# Patient Record
Sex: FEMALE | Race: WHITE | NOT HISPANIC OR LATINO | Employment: OTHER | URBAN - NONMETROPOLITAN AREA
[De-identification: names, ages, dates, MRNs, and addresses within clinical notes are randomized per-mention and may not be internally consistent; named-entity substitution may affect disease eponyms.]

---

## 2023-02-02 PROBLEM — K25.9 CAMERON ULCER: Status: ACTIVE | Noted: 2023-02-02

## 2023-02-02 PROBLEM — R93.89 ABNORMAL CHEST XRAY: Status: ACTIVE | Noted: 2023-02-02

## 2023-02-02 PROBLEM — E78.5 DYSLIPIDEMIA: Status: ACTIVE | Noted: 2023-02-02

## 2023-02-02 PROBLEM — C50.911 MALIGNANT NEOPLASM OF UNSPECIFIED SITE OF RIGHT FEMALE BREAST (MULTI): Status: ACTIVE | Noted: 2023-02-02

## 2023-02-02 PROBLEM — M10.9 GOUT: Status: ACTIVE | Noted: 2023-02-02

## 2023-02-02 PROBLEM — C50.411 MALIGNANT NEOPLASM OF UPPER-OUTER QUADRANT OF RIGHT FEMALE BREAST (MULTI): Status: ACTIVE | Noted: 2023-02-02

## 2023-02-02 PROBLEM — K52.9 COLITIS: Status: ACTIVE | Noted: 2023-02-02

## 2023-02-02 PROBLEM — R60.9 EDEMA: Status: ACTIVE | Noted: 2023-02-02

## 2023-02-02 PROBLEM — E55.9 VITAMIN D DEFICIENCY: Status: ACTIVE | Noted: 2023-02-02

## 2023-02-02 PROBLEM — B37.49 CANDIDA UTI: Status: ACTIVE | Noted: 2023-02-02

## 2023-02-02 PROBLEM — E03.9 HYPOTHYROID: Status: ACTIVE | Noted: 2023-02-02

## 2023-02-02 PROBLEM — K21.9 GERD WITHOUT ESOPHAGITIS: Status: ACTIVE | Noted: 2023-02-02

## 2023-02-02 PROBLEM — E66.9 CLASS 1 OBESITY WITH BODY MASS INDEX (BMI) OF 32.0 TO 32.9 IN ADULT: Status: ACTIVE | Noted: 2023-02-02

## 2023-02-02 PROBLEM — R39.9 URINARY SYMPTOM OR SIGN: Status: ACTIVE | Noted: 2023-02-02

## 2023-02-02 PROBLEM — E87.5 HYPERKALEMIA: Status: ACTIVE | Noted: 2023-02-02

## 2023-02-02 PROBLEM — E03.9 HYPOTHYROIDISM: Status: ACTIVE | Noted: 2023-02-02

## 2023-02-02 PROBLEM — E53.8 VITAMIN B12 DEFICIENCY: Status: ACTIVE | Noted: 2023-02-02

## 2023-02-02 PROBLEM — S43.006A SHOULDER DISLOCATION: Status: ACTIVE | Noted: 2023-02-02

## 2023-02-02 PROBLEM — M79.18 MUSCULOSKELETAL PAIN: Status: ACTIVE | Noted: 2023-02-02

## 2023-02-02 PROBLEM — M25.551 PAIN IN RIGHT HIP: Status: ACTIVE | Noted: 2023-02-02

## 2023-02-02 PROBLEM — E66.811 CLASS 1 OBESITY WITH BODY MASS INDEX (BMI) OF 32.0 TO 32.9 IN ADULT: Status: ACTIVE | Noted: 2023-02-02

## 2023-02-02 PROBLEM — N18.5 CKD (CHRONIC KIDNEY DISEASE), STAGE V (MULTI): Status: ACTIVE | Noted: 2023-02-02

## 2023-02-02 PROBLEM — C50.919 BREAST CANCER (MULTI): Status: ACTIVE | Noted: 2023-02-02

## 2023-02-02 PROBLEM — M25.511 RIGHT SHOULDER PAIN: Status: ACTIVE | Noted: 2023-02-02

## 2023-02-02 PROBLEM — E03.9 HYPOTHYROID: Status: RESOLVED | Noted: 2023-02-02 | Resolved: 2023-02-02

## 2023-02-02 PROBLEM — I34.81 MITRAL VALVE ANNULAR CALCIFICATION: Status: ACTIVE | Noted: 2023-02-02

## 2023-02-02 PROBLEM — Z20.822 SUSPECTED COVID-19 VIRUS INFECTION: Status: ACTIVE | Noted: 2023-02-02

## 2023-02-02 PROBLEM — Z86.79: Status: ACTIVE | Noted: 2023-02-02

## 2023-02-02 PROBLEM — N18.31 STAGE 3A CHRONIC KIDNEY DISEASE (MULTI): Status: ACTIVE | Noted: 2023-02-02

## 2023-02-02 PROBLEM — R10.2 PELVIC PAIN IN FEMALE: Status: ACTIVE | Noted: 2023-02-02

## 2023-02-02 PROBLEM — I10 BENIGN ESSENTIAL HYPERTENSION: Status: ACTIVE | Noted: 2023-02-02

## 2023-02-02 PROBLEM — M25.552 LEFT HIP PAIN: Status: ACTIVE | Noted: 2023-02-02

## 2023-02-02 PROBLEM — R53.83 FATIGUE: Status: ACTIVE | Noted: 2023-02-02

## 2023-02-02 PROBLEM — R92.8 ABNORMAL MAMMOGRAM OF RIGHT BREAST: Status: ACTIVE | Noted: 2023-02-02

## 2023-02-02 PROBLEM — K44.9 HIATAL HERNIA: Status: ACTIVE | Noted: 2023-02-02

## 2023-02-02 PROBLEM — N39.0 BACTERIAL UTI: Status: ACTIVE | Noted: 2023-02-02

## 2023-02-02 PROBLEM — I10 HYPERTENSION, UNCONTROLLED: Status: ACTIVE | Noted: 2023-02-02

## 2023-02-02 PROBLEM — E21.3 HYPERPARATHYROIDISM (MULTI): Status: ACTIVE | Noted: 2023-02-02

## 2023-02-02 PROBLEM — D64.9 ANEMIA: Status: ACTIVE | Noted: 2023-02-02

## 2023-02-02 PROBLEM — N18.30 STAGE 3 CHRONIC KIDNEY DISEASE DUE TO BENIGN HYPERTENSION (MULTI): Status: ACTIVE | Noted: 2023-02-02

## 2023-02-02 PROBLEM — N39.0 URINARY TRACT INFECTION: Status: ACTIVE | Noted: 2023-02-02

## 2023-02-02 PROBLEM — I12.9 STAGE 3 CHRONIC KIDNEY DISEASE DUE TO BENIGN HYPERTENSION (MULTI): Status: ACTIVE | Noted: 2023-02-02

## 2023-02-02 PROBLEM — A49.9 BACTERIAL UTI: Status: ACTIVE | Noted: 2023-02-02

## 2023-02-02 RX ORDER — FOLIC ACID 1 MG/1
1 TABLET ORAL DAILY
COMMUNITY
Start: 2020-12-23 | End: 2023-05-11 | Stop reason: SDUPTHER

## 2023-02-02 RX ORDER — ALLOPURINOL 100 MG/1
2 TABLET ORAL DAILY
COMMUNITY
Start: 2013-09-16 | End: 2023-07-27

## 2023-02-02 RX ORDER — CARVEDILOL 25 MG/1
1 TABLET ORAL
COMMUNITY
Start: 2013-08-05 | End: 2023-03-22 | Stop reason: SDUPTHER

## 2023-02-02 RX ORDER — CHOLECALCIFEROL (VITAMIN D3) 125 MCG
1 CAPSULE ORAL EVERY EVENING
COMMUNITY
Start: 2013-07-08

## 2023-02-02 RX ORDER — OMEPRAZOLE 20 MG/1
1 CAPSULE, DELAYED RELEASE ORAL DAILY
COMMUNITY
Start: 2022-01-10 | End: 2023-10-04 | Stop reason: SDUPTHER

## 2023-02-02 RX ORDER — ACETAMINOPHEN, DIPHENHYDRAMINE HCL, PHENYLEPHRINE HCL 325; 25; 5 MG/1; MG/1; MG/1
1 TABLET ORAL
COMMUNITY
Start: 2020-11-02

## 2023-02-02 RX ORDER — LEVOTHYROXINE SODIUM 50 UG/1
1 TABLET ORAL DAILY
COMMUNITY
Start: 2015-08-18 | End: 2023-04-10

## 2023-02-02 RX ORDER — ATORVASTATIN CALCIUM 20 MG/1
1 TABLET, FILM COATED ORAL DAILY
COMMUNITY
Start: 2013-07-08 | End: 2023-03-22 | Stop reason: SDUPTHER

## 2023-02-02 RX ORDER — ASPIRIN 81 MG/1
1 TABLET ORAL EVERY EVENING
COMMUNITY
Start: 2017-02-24

## 2023-02-02 RX ORDER — LOSARTAN POTASSIUM 100 MG/1
0.5 TABLET ORAL DAILY
COMMUNITY
Start: 2019-11-20 | End: 2023-05-11

## 2023-02-02 RX ORDER — FERROUS SULFATE 325(65) MG
1 TABLET ORAL 2 TIMES DAILY
COMMUNITY
Start: 2022-01-10 | End: 2023-10-04 | Stop reason: SDUPTHER

## 2023-02-02 RX ORDER — AMLODIPINE BESYLATE 2.5 MG/1
1 TABLET ORAL DAILY
COMMUNITY
Start: 2022-06-07 | End: 2023-05-23 | Stop reason: SDUPTHER

## 2023-03-22 ENCOUNTER — OFFICE VISIT (OUTPATIENT)
Dept: PRIMARY CARE | Facility: CLINIC | Age: 83
End: 2023-03-22
Payer: MEDICARE

## 2023-03-22 VITALS
DIASTOLIC BLOOD PRESSURE: 75 MMHG | TEMPERATURE: 97.5 F | BODY MASS INDEX: 32.63 KG/M2 | HEART RATE: 64 BPM | WEIGHT: 184.2 LBS | SYSTOLIC BLOOD PRESSURE: 125 MMHG | OXYGEN SATURATION: 98 %

## 2023-03-22 DIAGNOSIS — R80.9 PROTEINURIA, UNSPECIFIED TYPE: ICD-10-CM

## 2023-03-22 DIAGNOSIS — N18.30 STAGE 3 CHRONIC KIDNEY DISEASE DUE TO BENIGN HYPERTENSION (MULTI): ICD-10-CM

## 2023-03-22 DIAGNOSIS — E21.3 HYPERPARATHYROIDISM (MULTI): ICD-10-CM

## 2023-03-22 DIAGNOSIS — I12.9 STAGE 3 CHRONIC KIDNEY DISEASE DUE TO BENIGN HYPERTENSION (MULTI): ICD-10-CM

## 2023-03-22 DIAGNOSIS — K21.9 GERD WITHOUT ESOPHAGITIS: ICD-10-CM

## 2023-03-22 DIAGNOSIS — E66.09 CLASS 1 OBESITY DUE TO EXCESS CALORIES WITH BODY MASS INDEX (BMI) OF 32.0 TO 32.9 IN ADULT, UNSPECIFIED WHETHER SERIOUS COMORBIDITY PRESENT: ICD-10-CM

## 2023-03-22 DIAGNOSIS — E78.5 DYSLIPIDEMIA: ICD-10-CM

## 2023-03-22 DIAGNOSIS — M10.9 GOUT, UNSPECIFIED CAUSE, UNSPECIFIED CHRONICITY, UNSPECIFIED SITE: ICD-10-CM

## 2023-03-22 DIAGNOSIS — I10 HYPERTENSION, UNCONTROLLED: Primary | ICD-10-CM

## 2023-03-22 DIAGNOSIS — C50.911 MALIGNANT NEOPLASM OF RIGHT FEMALE BREAST, UNSPECIFIED ESTROGEN RECEPTOR STATUS, UNSPECIFIED SITE OF BREAST (MULTI): ICD-10-CM

## 2023-03-22 PROBLEM — N18.5 CKD (CHRONIC KIDNEY DISEASE), STAGE V (MULTI): Status: RESOLVED | Noted: 2023-02-02 | Resolved: 2023-03-22

## 2023-03-22 PROBLEM — Z20.822 SUSPECTED COVID-19 VIRUS INFECTION: Status: RESOLVED | Noted: 2023-02-02 | Resolved: 2023-03-22

## 2023-03-22 PROCEDURE — 3074F SYST BP LT 130 MM HG: CPT | Performed by: INTERNAL MEDICINE

## 2023-03-22 PROCEDURE — 1159F MED LIST DOCD IN RCRD: CPT | Performed by: INTERNAL MEDICINE

## 2023-03-22 PROCEDURE — 1036F TOBACCO NON-USER: CPT | Performed by: INTERNAL MEDICINE

## 2023-03-22 PROCEDURE — 99214 OFFICE O/P EST MOD 30 MIN: CPT | Performed by: INTERNAL MEDICINE

## 2023-03-22 PROCEDURE — 3078F DIAST BP <80 MM HG: CPT | Performed by: INTERNAL MEDICINE

## 2023-03-22 PROCEDURE — 1160F RVW MEDS BY RX/DR IN RCRD: CPT | Performed by: INTERNAL MEDICINE

## 2023-03-22 RX ORDER — CARVEDILOL 25 MG/1
25 TABLET ORAL
Qty: 180 TABLET | Refills: 0 | Status: SHIPPED | OUTPATIENT
Start: 2023-03-22 | End: 2023-04-03

## 2023-03-22 RX ORDER — ATORVASTATIN CALCIUM 20 MG/1
20 TABLET, FILM COATED ORAL DAILY
Qty: 30 TABLET | Refills: 1 | Status: SHIPPED | OUTPATIENT
Start: 2023-03-22 | End: 2023-05-11 | Stop reason: SDUPTHER

## 2023-03-22 ASSESSMENT — ENCOUNTER SYMPTOMS
DIARRHEA: 0
DIZZINESS: 0
BRUISES/BLEEDS EASILY: 0
BLOOD IN STOOL: 0
FEVER: 0
UNEXPECTED WEIGHT CHANGE: 0
HEADACHES: 0
WHEEZING: 0
ABDOMINAL PAIN: 0
HYPERTENSION: 1
COUGH: 0
SINUS PAIN: 0
DIFFICULTY URINATING: 0
SORE THROAT: 0
PALPITATIONS: 0
FATIGUE: 0
ARTHRALGIAS: 0

## 2023-03-22 NOTE — PROGRESS NOTES
"Subjective   Patient ID: Lilly Parr is a 82 y.o. female who presents for Hypothyroidism, Hypertension, GERD, and dyslipidemia.    \" Blood work reviewed with patient-chronic reflux disease symptoms are controlled  -Chronic kidney disease, controlled continue with low-salt diet increase fluid intake  - Hypertension controlled  - Chronic anemia stable asymptomatic continue conservative measures  - Chronic gout controlled continue current medication  -  Hypercholesteremia continue with current medication continue low-carb diet  -Breast cancer in remission continue on anastrozole no medication side effects  -Hypothyroid continue the levothyroxine , compensated        Hypertension  Pertinent negatives include no chest pain, headaches or palpitations.   GERD  She reports no abdominal pain, no chest pain, no coughing, no sore throat or no wheezing. Pertinent negatives include no fatigue.        Review of Systems   Constitutional:  Negative for fatigue, fever and unexpected weight change.   HENT:  Negative for congestion, ear discharge, ear pain, mouth sores, sinus pain and sore throat.    Eyes:  Negative for visual disturbance.   Respiratory:  Negative for cough and wheezing.    Cardiovascular:  Negative for chest pain, palpitations and leg swelling.   Gastrointestinal:  Negative for abdominal pain, blood in stool and diarrhea.   Genitourinary:  Negative for difficulty urinating.   Musculoskeletal:  Negative for arthralgias.   Skin:  Negative for rash.   Neurological:  Negative for dizziness and headaches.   Hematological:  Does not bruise/bleed easily.   Psychiatric/Behavioral:  Negative for behavioral problems.    All other systems reviewed and are negative.      Objective   /75   Pulse 64   Temp 36.4 °C (97.5 °F)   Wt 83.6 kg (184 lb 3.2 oz)   SpO2 98%   BMI 32.63 kg/m²     Physical Exam  Vitals and nursing note reviewed.   Constitutional:       Appearance: Normal appearance.   HENT:      Head: " "Normocephalic.      Nose: Nose normal.   Eyes:      Conjunctiva/sclera: Conjunctivae normal.      Pupils: Pupils are equal, round, and reactive to light.   Cardiovascular:      Rate and Rhythm: Regular rhythm.   Pulmonary:      Effort: Pulmonary effort is normal.      Breath sounds: Normal breath sounds.   Abdominal:      General: Abdomen is flat.      Palpations: Abdomen is soft.   Musculoskeletal:      Cervical back: Neck supple.   Skin:     General: Skin is warm.   Neurological:      General: No focal deficit present.      Mental Status: She is oriented to person, place, and time.   Psychiatric:         Mood and Affect: Mood normal.         Assessment/Plan   Problem List Items Addressed This Visit          Circulatory    Hypertension, uncontrolled - Primary    Relevant Medications    carvedilol (Coreg) 25 mg tablet       Digestive    GERD without esophagitis       Genitourinary    Stage 3 chronic kidney disease due to benign hypertension       Endocrine/Metabolic    Hyperparathyroidism (CMS/HCC)    Class 1 obesity with body mass index (BMI) of 32.0 to 32.9 in adult       Other    Breast cancer (CMS/HCC)    Dyslipidemia    Relevant Medications    atorvastatin (Lipitor) 20 mg tablet    Gout     Other Visit Diagnoses       Proteinuria, unspecified type            \" Blood work reviewed with patient-chronic reflux disease symptoms are controlled  -Chronic kidney disease, controlled continue with low-salt diet increase fluid intake  - Hypertension controlled  - Chronic anemia stable asymptomatic continue conservative measures  - Chronic gout controlled continue current medication  -  Hypercholesteremia continue with current medication continue low-carb diet  -Breast cancer in remission continue on anastrozole no medication side effects  -Hypothyroid continue the levothyroxine , compensated           "

## 2023-04-02 DIAGNOSIS — I10 HYPERTENSION, UNCONTROLLED: ICD-10-CM

## 2023-04-03 RX ORDER — CARVEDILOL 25 MG/1
25 TABLET ORAL
Qty: 180 TABLET | Refills: 0 | Status: SHIPPED | OUTPATIENT
Start: 2023-04-03 | End: 2023-06-20

## 2023-04-09 DIAGNOSIS — E03.9 HYPOTHYROIDISM, UNSPECIFIED TYPE: ICD-10-CM

## 2023-04-10 RX ORDER — LEVOTHYROXINE SODIUM 50 UG/1
TABLET ORAL
Qty: 90 TABLET | Refills: 0 | Status: SHIPPED | OUTPATIENT
Start: 2023-04-10 | End: 2023-07-27

## 2023-04-24 LAB
ALBUMIN (G/DL) IN SER/PLAS: 4.2 G/DL (ref 3.4–5)
ALBUMIN (MG/L) IN URINE: 12.6 MG/L
ALBUMIN/CREATININE (UG/MG) IN URINE: 25 UG/MG CRT (ref 0–30)
ANION GAP IN SER/PLAS: 12 MMOL/L (ref 10–20)
CALCIUM (MG/DL) IN SER/PLAS: 10.1 MG/DL (ref 8.6–10.3)
CARBON DIOXIDE, TOTAL (MMOL/L) IN SER/PLAS: 27 MMOL/L (ref 21–32)
CHLORIDE (MMOL/L) IN SER/PLAS: 104 MMOL/L (ref 98–107)
CREATININE (MG/DL) IN SER/PLAS: 1.22 MG/DL (ref 0.5–1.05)
CREATININE (MG/DL) IN URINE: 50.3 MG/DL (ref 20–320)
ERYTHROCYTE DISTRIBUTION WIDTH (RATIO) BY AUTOMATED COUNT: 14.3 % (ref 11.5–14.5)
ERYTHROCYTE MEAN CORPUSCULAR HEMOGLOBIN CONCENTRATION (G/DL) BY AUTOMATED: 31.9 G/DL (ref 32–36)
ERYTHROCYTE MEAN CORPUSCULAR VOLUME (FL) BY AUTOMATED COUNT: 103 FL (ref 80–100)
ERYTHROCYTES (10*6/UL) IN BLOOD BY AUTOMATED COUNT: 3.52 X10E12/L (ref 4–5.2)
FERRITIN (UG/LL) IN SER/PLAS: 1121 UG/L (ref 8–150)
GFR FEMALE: 43 ML/MIN/1.73M2
GLUCOSE (MG/DL) IN SER/PLAS: 102 MG/DL (ref 74–99)
HEMATOCRIT (%) IN BLOOD BY AUTOMATED COUNT: 36.1 % (ref 36–46)
HEMOGLOBIN (G/DL) IN BLOOD: 11.5 G/DL (ref 12–16)
IRON (UG/DL) IN SER/PLAS: 103 UG/DL (ref 35–150)
IRON BINDING CAPACITY (UG/DL) IN SER/PLAS: 245 UG/DL (ref 240–445)
IRON SATURATION (%) IN SER/PLAS: 42 % (ref 25–45)
LEUKOCYTES (10*3/UL) IN BLOOD BY AUTOMATED COUNT: 9.2 X10E9/L (ref 4.4–11.3)
PHOSPHATE (MG/DL) IN SER/PLAS: 3.5 MG/DL (ref 2.5–4.9)
PLATELETS (10*3/UL) IN BLOOD AUTOMATED COUNT: 242 X10E9/L (ref 150–450)
POTASSIUM (MMOL/L) IN SER/PLAS: 4.1 MMOL/L (ref 3.5–5.3)
SODIUM (MMOL/L) IN SER/PLAS: 139 MMOL/L (ref 136–145)
URATE (MG/DL) IN SER/PLAS: 4 MG/DL (ref 2.3–6.7)
UREA NITROGEN (MG/DL) IN SER/PLAS: 40 MG/DL (ref 6–23)

## 2023-05-11 DIAGNOSIS — I10 BENIGN ESSENTIAL HYPERTENSION: ICD-10-CM

## 2023-05-11 DIAGNOSIS — D64.9 ANEMIA, UNSPECIFIED TYPE: ICD-10-CM

## 2023-05-11 DIAGNOSIS — E78.5 DYSLIPIDEMIA: ICD-10-CM

## 2023-05-11 RX ORDER — ATORVASTATIN CALCIUM 20 MG/1
20 TABLET, FILM COATED ORAL DAILY
Qty: 90 TABLET | Refills: 0 | Status: SHIPPED | OUTPATIENT
Start: 2023-05-11 | End: 2023-05-30 | Stop reason: SDUPTHER

## 2023-05-11 RX ORDER — FOLIC ACID 1 MG/1
1 TABLET ORAL DAILY
Qty: 90 TABLET | Refills: 0 | Status: SHIPPED | OUTPATIENT
Start: 2023-05-11 | End: 2023-07-12

## 2023-05-11 RX ORDER — LOSARTAN POTASSIUM 100 MG/1
TABLET ORAL
Qty: 45 TABLET | Refills: 0 | Status: SHIPPED | OUTPATIENT
Start: 2023-05-11 | End: 2023-07-30 | Stop reason: SDUPTHER

## 2023-05-23 DIAGNOSIS — I10 BENIGN ESSENTIAL HYPERTENSION: ICD-10-CM

## 2023-05-23 RX ORDER — AMLODIPINE BESYLATE 2.5 MG/1
2.5 TABLET ORAL DAILY
Qty: 90 TABLET | Refills: 0 | Status: SHIPPED | OUTPATIENT
Start: 2023-05-23 | End: 2023-07-24

## 2023-05-30 DIAGNOSIS — E78.5 DYSLIPIDEMIA: ICD-10-CM

## 2023-05-30 RX ORDER — ATORVASTATIN CALCIUM 20 MG/1
20 TABLET, FILM COATED ORAL DAILY
Qty: 90 TABLET | Refills: 0 | Status: SHIPPED | OUTPATIENT
Start: 2023-05-30 | End: 2023-07-30 | Stop reason: SDUPTHER

## 2023-06-19 DIAGNOSIS — I10 HYPERTENSION, UNCONTROLLED: ICD-10-CM

## 2023-06-20 RX ORDER — CARVEDILOL 25 MG/1
TABLET ORAL
Qty: 180 TABLET | Refills: 1 | Status: SHIPPED | OUTPATIENT
Start: 2023-06-20 | End: 2023-10-04 | Stop reason: SDUPTHER

## 2023-06-26 ENCOUNTER — OFFICE VISIT (OUTPATIENT)
Dept: PRIMARY CARE | Facility: CLINIC | Age: 83
End: 2023-06-26
Payer: MEDICARE

## 2023-06-26 VITALS
DIASTOLIC BLOOD PRESSURE: 72 MMHG | HEIGHT: 62 IN | SYSTOLIC BLOOD PRESSURE: 140 MMHG | BODY MASS INDEX: 33.93 KG/M2 | HEART RATE: 63 BPM | WEIGHT: 184.4 LBS | OXYGEN SATURATION: 98 % | TEMPERATURE: 96.9 F

## 2023-06-26 DIAGNOSIS — Z12.31 ENCOUNTER FOR SCREENING MAMMOGRAM FOR MALIGNANT NEOPLASM OF BREAST: ICD-10-CM

## 2023-06-26 DIAGNOSIS — I12.9 STAGE 3 CHRONIC KIDNEY DISEASE DUE TO BENIGN HYPERTENSION (MULTI): ICD-10-CM

## 2023-06-26 DIAGNOSIS — E55.9 VITAMIN D DEFICIENCY: ICD-10-CM

## 2023-06-26 DIAGNOSIS — E55.9 VITAMIN D DEFICIENCY, UNSPECIFIED: ICD-10-CM

## 2023-06-26 DIAGNOSIS — E78.00 HYPERCHOLESTEREMIA: ICD-10-CM

## 2023-06-26 DIAGNOSIS — D64.9 ANEMIA, UNSPECIFIED TYPE: ICD-10-CM

## 2023-06-26 DIAGNOSIS — Z79.899 HIGH RISK MEDICATION USE: ICD-10-CM

## 2023-06-26 DIAGNOSIS — I10 HYPERTENSION, UNSPECIFIED TYPE: ICD-10-CM

## 2023-06-26 DIAGNOSIS — E03.9 HYPOTHYROIDISM, UNSPECIFIED TYPE: ICD-10-CM

## 2023-06-26 DIAGNOSIS — Z00.00 ROUTINE GENERAL MEDICAL EXAMINATION AT HEALTH CARE FACILITY: Primary | ICD-10-CM

## 2023-06-26 DIAGNOSIS — N18.30 STAGE 3 CHRONIC KIDNEY DISEASE DUE TO BENIGN HYPERTENSION (MULTI): ICD-10-CM

## 2023-06-26 DIAGNOSIS — Z13.89 SCREENING FOR MULTIPLE CONDITIONS: ICD-10-CM

## 2023-06-26 DIAGNOSIS — C50.411 MALIGNANT NEOPLASM OF UPPER-OUTER QUADRANT OF RIGHT FEMALE BREAST, UNSPECIFIED ESTROGEN RECEPTOR STATUS (MULTI): ICD-10-CM

## 2023-06-26 DIAGNOSIS — I10 HYPERTENSION, UNCONTROLLED: ICD-10-CM

## 2023-06-26 DIAGNOSIS — E78.5 DYSLIPIDEMIA: ICD-10-CM

## 2023-06-26 DIAGNOSIS — R53.83 OTHER FATIGUE: ICD-10-CM

## 2023-06-26 DIAGNOSIS — I10 BENIGN ESSENTIAL HYPERTENSION: ICD-10-CM

## 2023-06-26 PROBLEM — R93.89 ABNORMAL CHEST XRAY: Status: RESOLVED | Noted: 2023-02-02 | Resolved: 2023-06-26

## 2023-06-26 PROBLEM — R92.8 ABNORMAL MAMMOGRAM OF RIGHT BREAST: Status: RESOLVED | Noted: 2023-02-02 | Resolved: 2023-06-26

## 2023-06-26 PROBLEM — K52.9 COLITIS: Status: RESOLVED | Noted: 2023-02-02 | Resolved: 2023-06-26

## 2023-06-26 PROBLEM — K25.9 CAMERON ULCER: Status: RESOLVED | Noted: 2023-02-02 | Resolved: 2023-06-26

## 2023-06-26 PROCEDURE — G0444 DEPRESSION SCREEN ANNUAL: HCPCS | Performed by: INTERNAL MEDICINE

## 2023-06-26 PROCEDURE — 3078F DIAST BP <80 MM HG: CPT | Performed by: INTERNAL MEDICINE

## 2023-06-26 PROCEDURE — 3077F SYST BP >= 140 MM HG: CPT | Performed by: INTERNAL MEDICINE

## 2023-06-26 PROCEDURE — 1160F RVW MEDS BY RX/DR IN RCRD: CPT | Performed by: INTERNAL MEDICINE

## 2023-06-26 PROCEDURE — 1036F TOBACCO NON-USER: CPT | Performed by: INTERNAL MEDICINE

## 2023-06-26 PROCEDURE — 99214 OFFICE O/P EST MOD 30 MIN: CPT | Performed by: INTERNAL MEDICINE

## 2023-06-26 PROCEDURE — 1170F FXNL STATUS ASSESSED: CPT | Performed by: INTERNAL MEDICINE

## 2023-06-26 PROCEDURE — 1159F MED LIST DOCD IN RCRD: CPT | Performed by: INTERNAL MEDICINE

## 2023-06-26 PROCEDURE — G0439 PPPS, SUBSEQ VISIT: HCPCS | Performed by: INTERNAL MEDICINE

## 2023-06-26 ASSESSMENT — ENCOUNTER SYMPTOMS
ABDOMINAL PAIN: 0
LOSS OF SENSATION IN FEET: 0
HYPERTENSION: 1
DIARRHEA: 0
DIZZINESS: 0
BRUISES/BLEEDS EASILY: 0
UNEXPECTED WEIGHT CHANGE: 0
WHEEZING: 0
DEPRESSION: 0
OCCASIONAL FEELINGS OF UNSTEADINESS: 0
COUGH: 0
DIFFICULTY URINATING: 0
SINUS PAIN: 0
HEADACHES: 0
SORE THROAT: 0
BLOOD IN STOOL: 0
FATIGUE: 0
PALPITATIONS: 0
ARTHRALGIAS: 0
FEVER: 0

## 2023-06-26 ASSESSMENT — ACTIVITIES OF DAILY LIVING (ADL)
MANAGING_FINANCES: INDEPENDENT
BATHING: INDEPENDENT
GROCERY_SHOPPING: INDEPENDENT
DRESSING: INDEPENDENT
TAKING_MEDICATION: INDEPENDENT
DOING_HOUSEWORK: INDEPENDENT

## 2023-06-26 ASSESSMENT — PATIENT HEALTH QUESTIONNAIRE - PHQ9
2. FEELING DOWN, DEPRESSED OR HOPELESS: NOT AT ALL
SUM OF ALL RESPONSES TO PHQ9 QUESTIONS 1 AND 2: 0
1. LITTLE INTEREST OR PLEASURE IN DOING THINGS: NOT AT ALL

## 2023-06-26 NOTE — PROGRESS NOTES
Subjective   Reason for Visit: Lilly Parr is an 83 y.o. female here for a Medicare Wellness visit.     Past Medical, Surgical, and Family History reviewed and updated in chart.    Reviewed all medications by prescribing practitioner or clinical pharmacist (such as prescriptions, OTCs, herbal therapies and supplements) and documented in the medical record.    Annual preventive visit  - Vaccinations reviewed and up-to-date  - Screening for colon cancer not treated  -Breast cancer in remission follow-up oncology repeat mammogram in October 2023  Needs to complete blood work before next appointment    Depression screening negative  I spent 15 minutes obtaining and discussing depression screening using PHQ-2 questions with results documented in the chart.    Medicare screening reviewed with patient    For follow-up   -Chronic kidney disease, controlled continue with low-salt diet increase fluid intake  - Hypertension controlled.  - Chronic anemia stable asymptomatic continue conservative measures  - Chronic gout controlled continue current medication  -  Hypercholesteremia continue with current medication continue low-carb diet.  -Breast cancer in remission continue on anastrozole no medication side effects  -Hypothyroid continue the levothyroxine , compensated.  Follow-up 3 months      Hypertension  Pertinent negatives include no chest pain, headaches or palpitations.   Hyperlipidemia  Pertinent negatives include no chest pain.       Patient Care Team:  Jarvis Fuentes MD as PCP - General  Jarvis Fuentes MD as PCP - [facility]     Review of Systems   Constitutional:  Negative for fatigue, fever and unexpected weight change.   HENT:  Negative for congestion, ear discharge, ear pain, mouth sores, sinus pain and sore throat.    Eyes:  Negative for visual disturbance.   Respiratory:  Negative for cough and wheezing.    Cardiovascular:  Negative for chest pain, palpitations and leg swelling.  "  Gastrointestinal:  Negative for abdominal pain, blood in stool and diarrhea.   Genitourinary:  Negative for difficulty urinating.   Musculoskeletal:  Negative for arthralgias.   Skin:  Negative for rash.   Neurological:  Negative for dizziness and headaches.   Hematological:  Does not bruise/bleed easily.   Psychiatric/Behavioral:  Negative for behavioral problems.    All other systems reviewed and are negative.      Objective   Vitals:  /72   Pulse 63   Temp 36.1 °C (96.9 °F)   Ht 1.575 m (5' 2\")   Wt 83.6 kg (184 lb 6.4 oz)   SpO2 98%   BMI 33.73 kg/m²       Physical Exam  Vitals and nursing note reviewed.   Constitutional:       Appearance: Normal appearance.   HENT:      Head: Normocephalic.      Nose: Nose normal.   Eyes:      Conjunctiva/sclera: Conjunctivae normal.      Pupils: Pupils are equal, round, and reactive to light.   Cardiovascular:      Rate and Rhythm: Regular rhythm.   Pulmonary:      Effort: Pulmonary effort is normal.      Breath sounds: Normal breath sounds.   Abdominal:      General: Abdomen is flat.      Palpations: Abdomen is soft.   Musculoskeletal:      Cervical back: Neck supple.   Skin:     General: Skin is warm.   Neurological:      General: No focal deficit present.      Mental Status: She is oriented to person, place, and time.   Psychiatric:         Mood and Affect: Mood normal.         Assessment/Plan   Problem List Items Addressed This Visit       Anemia    Benign essential hypertension    Dyslipidemia    Fatigue    Relevant Orders    TSH with reflex to Free T4 if abnormal    Hypertension, uncontrolled    Hypothyroidism    Malignant neoplasm of upper-outer quadrant of right female breast (CMS/HCC)    Stage 3 chronic kidney disease due to benign hypertension    Vitamin D deficiency     Other Visit Diagnoses       Routine general medical examination at health care facility    -  Primary    Screening for multiple conditions        Encounter for screening mammogram for " malignant neoplasm of breast        Relevant Orders    BI mammo bilateral screening tomosynthesis    High risk medication use        Relevant Orders    CBC and Auto Differential    Hypertension, unspecified type        Relevant Orders    Comprehensive Metabolic Panel    Hypercholesteremia        Relevant Orders    Lipid Panel    Vitamin D deficiency, unspecified        Relevant Orders    Vitamin D, Total          Annual preventive visit  - Vaccinations reviewed and up-to-date  - Screening for colon cancer not treated  -Breast cancer in remission follow-up oncology repeat mammogram in October 2023  Needs to complete blood work before next appointment    Depression screening negative  I spent 15 minutes obtaining and discussing depression screening using PHQ-2 questions with results documented in the chart.    Medicare screening reviewed with patient    For follow-up   -Chronic kidney disease, controlled continue with low-salt diet increase fluid intake  - Hypertension controlled.  - Chronic anemia stable asymptomatic continue conservative measures  - Chronic gout controlled continue current medication  -  Hypercholesteremia continue with current medication continue low-carb diet.  -Breast cancer in remission continue on anastrozole no medication side effects  -Hypothyroid continue the levothyroxine , compensated.  Follow-up 3 months

## 2023-07-12 DIAGNOSIS — D64.9 ANEMIA, UNSPECIFIED TYPE: ICD-10-CM

## 2023-07-12 RX ORDER — FOLIC ACID 1 MG/1
TABLET ORAL
Qty: 90 TABLET | Refills: 1 | Status: SHIPPED | OUTPATIENT
Start: 2023-07-12 | End: 2023-10-04 | Stop reason: SDUPTHER

## 2023-07-24 DIAGNOSIS — I10 BENIGN ESSENTIAL HYPERTENSION: ICD-10-CM

## 2023-07-24 RX ORDER — AMLODIPINE BESYLATE 2.5 MG/1
2.5 TABLET ORAL DAILY
Qty: 90 TABLET | Refills: 1 | Status: SHIPPED | OUTPATIENT
Start: 2023-07-24 | End: 2023-10-04 | Stop reason: SDUPTHER

## 2023-07-26 DIAGNOSIS — E03.9 HYPOTHYROIDISM, UNSPECIFIED TYPE: ICD-10-CM

## 2023-07-26 DIAGNOSIS — M10.9 GOUT, UNSPECIFIED CAUSE, UNSPECIFIED CHRONICITY, UNSPECIFIED SITE: ICD-10-CM

## 2023-07-27 RX ORDER — LEVOTHYROXINE SODIUM 50 UG/1
TABLET ORAL
Qty: 90 TABLET | Refills: 0 | Status: SHIPPED | OUTPATIENT
Start: 2023-07-27 | End: 2023-09-28

## 2023-07-27 RX ORDER — ALLOPURINOL 100 MG/1
200 TABLET ORAL DAILY
Qty: 180 TABLET | Refills: 0 | Status: SHIPPED | OUTPATIENT
Start: 2023-07-27 | End: 2023-09-28

## 2023-07-28 DIAGNOSIS — I10 BENIGN ESSENTIAL HYPERTENSION: ICD-10-CM

## 2023-07-28 DIAGNOSIS — E78.5 DYSLIPIDEMIA: ICD-10-CM

## 2023-07-30 RX ORDER — ATORVASTATIN CALCIUM 20 MG/1
20 TABLET, FILM COATED ORAL DAILY
Qty: 90 TABLET | Refills: 1 | Status: SHIPPED | OUTPATIENT
Start: 2023-07-30 | End: 2023-10-04 | Stop reason: SDUPTHER

## 2023-07-30 RX ORDER — LOSARTAN POTASSIUM 100 MG/1
TABLET ORAL
Qty: 45 TABLET | Refills: 1 | Status: SHIPPED | OUTPATIENT
Start: 2023-07-30 | End: 2023-10-04 | Stop reason: SDUPTHER

## 2023-09-26 ENCOUNTER — APPOINTMENT (OUTPATIENT)
Dept: PRIMARY CARE | Facility: CLINIC | Age: 83
End: 2023-09-26
Payer: MEDICARE

## 2023-09-28 DIAGNOSIS — E03.9 HYPOTHYROIDISM, UNSPECIFIED TYPE: ICD-10-CM

## 2023-09-28 DIAGNOSIS — M10.9 GOUT, UNSPECIFIED CAUSE, UNSPECIFIED CHRONICITY, UNSPECIFIED SITE: ICD-10-CM

## 2023-09-28 RX ORDER — LEVOTHYROXINE SODIUM 50 UG/1
TABLET ORAL
Qty: 90 TABLET | Refills: 1 | Status: SHIPPED | OUTPATIENT
Start: 2023-09-28 | End: 2023-10-04 | Stop reason: SDUPTHER

## 2023-09-28 RX ORDER — ALLOPURINOL 100 MG/1
200 TABLET ORAL DAILY
Qty: 180 TABLET | Refills: 1 | Status: SHIPPED | OUTPATIENT
Start: 2023-09-28 | End: 2023-10-04 | Stop reason: SDUPTHER

## 2023-10-02 ENCOUNTER — OFFICE VISIT (OUTPATIENT)
Dept: HEMATOLOGY/ONCOLOGY | Facility: HOSPITAL | Age: 83
End: 2023-10-02
Payer: MEDICARE

## 2023-10-02 ENCOUNTER — LAB (OUTPATIENT)
Dept: LAB | Facility: LAB | Age: 83
End: 2023-10-02
Payer: MEDICARE

## 2023-10-02 VITALS
HEART RATE: 67 BPM | DIASTOLIC BLOOD PRESSURE: 79 MMHG | OXYGEN SATURATION: 98 % | HEIGHT: 63 IN | BODY MASS INDEX: 32.15 KG/M2 | RESPIRATION RATE: 18 BRPM | WEIGHT: 181.44 LBS | TEMPERATURE: 98.2 F | SYSTOLIC BLOOD PRESSURE: 131 MMHG

## 2023-10-02 DIAGNOSIS — B37.2 CUTANEOUS CANDIDIASIS: Primary | ICD-10-CM

## 2023-10-02 DIAGNOSIS — C50.911 MALIGNANT NEOPLASM OF RIGHT FEMALE BREAST, UNSPECIFIED ESTROGEN RECEPTOR STATUS, UNSPECIFIED SITE OF BREAST (MULTI): ICD-10-CM

## 2023-10-02 DIAGNOSIS — E53.1 VITAMIN B6 DEFICIENCY: ICD-10-CM

## 2023-10-02 DIAGNOSIS — E53.8 VITAMIN B12 DEFICIENCY: ICD-10-CM

## 2023-10-02 DIAGNOSIS — D64.9 ANEMIA, UNSPECIFIED TYPE: ICD-10-CM

## 2023-10-02 DIAGNOSIS — E61.1 IRON DEFICIENCY: ICD-10-CM

## 2023-10-02 DIAGNOSIS — D64.9 ANEMIA, UNSPECIFIED TYPE: Primary | ICD-10-CM

## 2023-10-02 DIAGNOSIS — I10 BENIGN ESSENTIAL HYPERTENSION: ICD-10-CM

## 2023-10-02 LAB
BASOPHILS # BLD AUTO: 0.09 X10*3/UL (ref 0–0.1)
BASOPHILS NFR BLD AUTO: 0.9 %
EOSINOPHIL # BLD AUTO: 0.79 X10*3/UL (ref 0–0.4)
EOSINOPHIL NFR BLD AUTO: 8.3 %
ERYTHROCYTE [DISTWIDTH] IN BLOOD BY AUTOMATED COUNT: 13.9 % (ref 11.5–14.5)
FERRITIN SERPL-MCNC: 969 NG/ML (ref 8–150)
HCT VFR BLD AUTO: 35 % (ref 36–46)
HGB BLD-MCNC: 11.1 G/DL (ref 12–16)
IMM GRANULOCYTES # BLD AUTO: 0.05 X10*3/UL (ref 0–0.5)
IMM GRANULOCYTES NFR BLD AUTO: 0.5 % (ref 0–0.9)
IRON SATN MFR SERPL: 23 % (ref 25–45)
IRON SERPL-MCNC: 54 UG/DL (ref 35–150)
LYMPHOCYTES # BLD AUTO: 1.36 X10*3/UL (ref 0.8–3)
LYMPHOCYTES NFR BLD AUTO: 14.3 %
MCH RBC QN AUTO: 32.7 PG (ref 26–34)
MCHC RBC AUTO-ENTMCNC: 31.7 G/DL (ref 32–36)
MCV RBC AUTO: 103 FL (ref 80–100)
MONOCYTES # BLD AUTO: 0.71 X10*3/UL (ref 0.05–0.8)
MONOCYTES NFR BLD AUTO: 7.5 %
NEUTROPHILS # BLD AUTO: 6.53 X10*3/UL (ref 1.6–5.5)
NEUTROPHILS NFR BLD AUTO: 68.5 %
NRBC BLD-RTO: 0 /100 WBCS (ref 0–0)
PLATELET # BLD AUTO: 243 X10*3/UL (ref 150–450)
PMV BLD AUTO: 12.1 FL (ref 7.5–11.5)
RBC # BLD AUTO: 3.39 X10*6/UL (ref 4–5.2)
TIBC SERPL-MCNC: 237 UG/DL (ref 240–445)
UIBC SERPL-MCNC: 183 UG/DL (ref 110–370)
WBC # BLD AUTO: 9.5 X10*3/UL (ref 4.4–11.3)

## 2023-10-02 PROCEDURE — 85025 COMPLETE CBC W/AUTO DIFF WBC: CPT

## 2023-10-02 PROCEDURE — 83540 ASSAY OF IRON: CPT

## 2023-10-02 PROCEDURE — 82728 ASSAY OF FERRITIN: CPT

## 2023-10-02 PROCEDURE — 99214 OFFICE O/P EST MOD 30 MIN: CPT | Performed by: INTERNAL MEDICINE

## 2023-10-02 PROCEDURE — 1036F TOBACCO NON-USER: CPT | Performed by: INTERNAL MEDICINE

## 2023-10-02 PROCEDURE — 1160F RVW MEDS BY RX/DR IN RCRD: CPT | Performed by: INTERNAL MEDICINE

## 2023-10-02 PROCEDURE — 1126F AMNT PAIN NOTED NONE PRSNT: CPT | Performed by: INTERNAL MEDICINE

## 2023-10-02 PROCEDURE — 84207 ASSAY OF VITAMIN B-6: CPT

## 2023-10-02 PROCEDURE — 3077F SYST BP >= 140 MM HG: CPT | Performed by: INTERNAL MEDICINE

## 2023-10-02 PROCEDURE — 3078F DIAST BP <80 MM HG: CPT | Performed by: INTERNAL MEDICINE

## 2023-10-02 PROCEDURE — 36415 COLL VENOUS BLD VENIPUNCTURE: CPT

## 2023-10-02 PROCEDURE — 3075F SYST BP GE 130 - 139MM HG: CPT | Performed by: INTERNAL MEDICINE

## 2023-10-02 PROCEDURE — 1159F MED LIST DOCD IN RCRD: CPT | Performed by: INTERNAL MEDICINE

## 2023-10-02 PROCEDURE — 83550 IRON BINDING TEST: CPT

## 2023-10-02 RX ORDER — NYSTATIN 100000 [USP'U]/G
POWDER TOPICAL 2 TIMES DAILY
Status: DISCONTINUED | OUTPATIENT
Start: 2023-10-02 | End: 2023-11-06 | Stop reason: SDUPTHER

## 2023-10-02 RX ORDER — PYRIDOXINE HCL (VITAMIN B6) 25 MG
25 TABLET ORAL
COMMUNITY
Start: 2023-08-21

## 2023-10-02 ASSESSMENT — PAIN SCALES - GENERAL: PAINLEVEL: 0-NO PAIN

## 2023-10-03 DIAGNOSIS — C43.9 MELANOMA OF SKIN (MULTI): Primary | ICD-10-CM

## 2023-10-03 DIAGNOSIS — B37.2 CUTANEOUS CANDIDIASIS: ICD-10-CM

## 2023-10-03 LAB — VIT B12 SERPL-MCNC: 1402 PG/ML (ref 211–911)

## 2023-10-03 RX ORDER — NYSTATIN 100000 [USP'U]/G
POWDER TOPICAL 3 TIMES DAILY
Qty: 60 G | Refills: 6 | Status: SHIPPED | OUTPATIENT
Start: 2023-10-03 | End: 2024-10-02

## 2023-10-04 ENCOUNTER — OFFICE VISIT (OUTPATIENT)
Dept: PRIMARY CARE | Facility: CLINIC | Age: 83
End: 2023-10-04
Payer: MEDICARE

## 2023-10-04 ENCOUNTER — LAB (OUTPATIENT)
Dept: LAB | Facility: LAB | Age: 83
End: 2023-10-04
Payer: MEDICARE

## 2023-10-04 VITALS
DIASTOLIC BLOOD PRESSURE: 70 MMHG | TEMPERATURE: 97.2 F | HEART RATE: 57 BPM | WEIGHT: 180.8 LBS | BODY MASS INDEX: 32.54 KG/M2 | SYSTOLIC BLOOD PRESSURE: 120 MMHG | OXYGEN SATURATION: 99 %

## 2023-10-04 DIAGNOSIS — E78.00 HYPERCHOLESTEREMIA: ICD-10-CM

## 2023-10-04 DIAGNOSIS — E61.1 IRON DEFICIENCY: ICD-10-CM

## 2023-10-04 DIAGNOSIS — E78.5 DYSLIPIDEMIA: ICD-10-CM

## 2023-10-04 DIAGNOSIS — E55.9 VITAMIN D DEFICIENCY, UNSPECIFIED: ICD-10-CM

## 2023-10-04 DIAGNOSIS — M10.9 GOUT, UNSPECIFIED CAUSE, UNSPECIFIED CHRONICITY, UNSPECIFIED SITE: ICD-10-CM

## 2023-10-04 DIAGNOSIS — E03.9 HYPOTHYROIDISM, UNSPECIFIED TYPE: ICD-10-CM

## 2023-10-04 DIAGNOSIS — E53.8 VITAMIN B12 DEFICIENCY: ICD-10-CM

## 2023-10-04 DIAGNOSIS — D64.9 ANEMIA, UNSPECIFIED TYPE: ICD-10-CM

## 2023-10-04 DIAGNOSIS — I10 HYPERTENSION, UNCONTROLLED: ICD-10-CM

## 2023-10-04 DIAGNOSIS — I10 HYPERTENSION, UNSPECIFIED TYPE: Primary | ICD-10-CM

## 2023-10-04 DIAGNOSIS — I10 BENIGN ESSENTIAL HYPERTENSION: ICD-10-CM

## 2023-10-04 DIAGNOSIS — K21.9 GERD WITHOUT ESOPHAGITIS: ICD-10-CM

## 2023-10-04 DIAGNOSIS — R91.8 ABNORMAL CT SCAN OF LUNG: ICD-10-CM

## 2023-10-04 DIAGNOSIS — I12.9 STAGE 3 CHRONIC KIDNEY DISEASE DUE TO BENIGN HYPERTENSION (MULTI): ICD-10-CM

## 2023-10-04 DIAGNOSIS — N18.30 STAGE 3 CHRONIC KIDNEY DISEASE DUE TO BENIGN HYPERTENSION (MULTI): ICD-10-CM

## 2023-10-04 DIAGNOSIS — I10 HYPERTENSION, UNSPECIFIED TYPE: ICD-10-CM

## 2023-10-04 DIAGNOSIS — R53.83 OTHER FATIGUE: ICD-10-CM

## 2023-10-04 LAB
25(OH)D3 SERPL-MCNC: 36 NG/ML (ref 30–100)
ALBUMIN SERPL BCP-MCNC: 3.9 G/DL (ref 3.4–5)
ALP SERPL-CCNC: 60 U/L (ref 33–136)
ALT SERPL W P-5'-P-CCNC: 20 U/L (ref 7–45)
ANION GAP SERPL CALC-SCNC: 12 MMOL/L (ref 10–20)
AST SERPL W P-5'-P-CCNC: 27 U/L (ref 9–39)
BILIRUB SERPL-MCNC: 0.6 MG/DL (ref 0–1.2)
BUN SERPL-MCNC: 22 MG/DL (ref 6–23)
CALCIUM SERPL-MCNC: 9.9 MG/DL (ref 8.6–10.3)
CHLORIDE SERPL-SCNC: 106 MMOL/L (ref 98–107)
CHOLEST SERPL-MCNC: 130 MG/DL (ref 0–199)
CHOLESTEROL/HDL RATIO: 2.8
CO2 SERPL-SCNC: 25 MMOL/L (ref 21–32)
CREAT SERPL-MCNC: 1.1 MG/DL (ref 0.5–1.05)
GFR SERPL CREATININE-BSD FRML MDRD: 49 ML/MIN/1.73M*2
GLUCOSE SERPL-MCNC: 107 MG/DL (ref 74–99)
HDLC SERPL-MCNC: 46.4 MG/DL
LDLC SERPL CALC-MCNC: 64 MG/DL (ref 140–190)
NON HDL CHOLESTEROL: 84 MG/DL (ref 0–149)
POTASSIUM SERPL-SCNC: 4.2 MMOL/L (ref 3.5–5.3)
PROT SERPL-MCNC: 7 G/DL (ref 6.4–8.2)
SODIUM SERPL-SCNC: 139 MMOL/L (ref 136–145)
TRIGL SERPL-MCNC: 98 MG/DL (ref 0–149)
TSH SERPL-ACNC: 2.02 MIU/L (ref 0.44–3.98)
VLDL: 20 MG/DL (ref 0–40)

## 2023-10-04 PROCEDURE — 1036F TOBACCO NON-USER: CPT | Performed by: INTERNAL MEDICINE

## 2023-10-04 PROCEDURE — 82306 VITAMIN D 25 HYDROXY: CPT

## 2023-10-04 PROCEDURE — 1159F MED LIST DOCD IN RCRD: CPT | Performed by: INTERNAL MEDICINE

## 2023-10-04 PROCEDURE — 1126F AMNT PAIN NOTED NONE PRSNT: CPT | Performed by: INTERNAL MEDICINE

## 2023-10-04 PROCEDURE — 1160F RVW MEDS BY RX/DR IN RCRD: CPT | Performed by: INTERNAL MEDICINE

## 2023-10-04 PROCEDURE — 99214 OFFICE O/P EST MOD 30 MIN: CPT | Performed by: INTERNAL MEDICINE

## 2023-10-04 PROCEDURE — 3078F DIAST BP <80 MM HG: CPT | Performed by: INTERNAL MEDICINE

## 2023-10-04 PROCEDURE — 3074F SYST BP LT 130 MM HG: CPT | Performed by: INTERNAL MEDICINE

## 2023-10-04 PROCEDURE — 36415 COLL VENOUS BLD VENIPUNCTURE: CPT

## 2023-10-04 RX ORDER — ALLOPURINOL 100 MG/1
200 TABLET ORAL DAILY
Qty: 180 TABLET | Refills: 1 | Status: SHIPPED | OUTPATIENT
Start: 2023-10-04 | End: 2024-04-04 | Stop reason: SDUPTHER

## 2023-10-04 RX ORDER — CARVEDILOL 25 MG/1
25 TABLET ORAL
Qty: 180 TABLET | Refills: 1 | Status: SHIPPED | OUTPATIENT
Start: 2023-10-04 | End: 2024-02-06 | Stop reason: SDUPTHER

## 2023-10-04 RX ORDER — ATORVASTATIN CALCIUM 20 MG/1
20 TABLET, FILM COATED ORAL DAILY
Qty: 90 TABLET | Refills: 1 | Status: SHIPPED | OUTPATIENT
Start: 2023-10-04 | End: 2024-02-06 | Stop reason: SDUPTHER

## 2023-10-04 RX ORDER — FERROUS SULFATE 325(65) MG
1 TABLET ORAL 2 TIMES DAILY
Qty: 180 TABLET | Refills: 1 | Status: SHIPPED | OUTPATIENT
Start: 2023-10-04 | End: 2023-11-08

## 2023-10-04 RX ORDER — LOSARTAN POTASSIUM 100 MG/1
50 TABLET ORAL DAILY
Qty: 45 TABLET | Refills: 1 | Status: SHIPPED | OUTPATIENT
Start: 2023-10-04 | End: 2024-02-06 | Stop reason: SDUPTHER

## 2023-10-04 RX ORDER — OMEPRAZOLE 20 MG/1
20 CAPSULE, DELAYED RELEASE ORAL
Qty: 90 CAPSULE | Refills: 1 | Status: SHIPPED | OUTPATIENT
Start: 2023-10-04 | End: 2024-03-18

## 2023-10-04 RX ORDER — LEVOTHYROXINE SODIUM 50 UG/1
50 TABLET ORAL DAILY
Qty: 90 TABLET | Refills: 1 | Status: SHIPPED | OUTPATIENT
Start: 2023-10-04 | End: 2023-12-27 | Stop reason: SDUPTHER

## 2023-10-04 RX ORDER — AMLODIPINE BESYLATE 2.5 MG/1
2.5 TABLET ORAL DAILY
Qty: 90 TABLET | Refills: 1 | Status: SHIPPED | OUTPATIENT
Start: 2023-10-04 | End: 2024-02-06 | Stop reason: SDUPTHER

## 2023-10-04 RX ORDER — FOLIC ACID 1 MG/1
1 TABLET ORAL DAILY
Qty: 90 TABLET | Refills: 1 | Status: ON HOLD | OUTPATIENT
Start: 2023-10-04 | End: 2024-03-14 | Stop reason: SDUPTHER

## 2023-10-04 ASSESSMENT — ENCOUNTER SYMPTOMS
DIARRHEA: 0
UNEXPECTED WEIGHT CHANGE: 0
BLOOD IN STOOL: 0
PALPITATIONS: 0
HEADACHES: 0
ABDOMINAL PAIN: 0
SORE THROAT: 0
FEVER: 0
WHEEZING: 0
ARTHRALGIAS: 0
HYPERTENSION: 1
DIZZINESS: 0
SINUS PAIN: 0
BRUISES/BLEEDS EASILY: 0
COUGH: 0
FATIGUE: 0
DIFFICULTY URINATING: 0

## 2023-10-04 NOTE — PROGRESS NOTES
Subjective   Patient ID: Lilly Parr is a 83 y.o. female who presents for Hyperlipidemia, Hypothyroidism, Hypertension, and Flu Vaccine (Will get at [facility] [employer]).    -Patient seen by hematology oncology chronic anemia needs to continue monitoring continue with vitamin B12 iron tablet  - Patient declined bone marrow biopsy aware of risk and benefit continue monitor conservatively  -Abnormal CT scan need to follow-up lung CT  -Chronic kidney disease, controlled continue with low-salt diet increase fluid intake no change continue monitoring  - Hypertension controlled.  Continue with current medication continue low-salt diet  -Need to proceed with mammogram  - Need to proceed with bone density as recommended follow-up results closely  - Chronic gout controlled continue current medication  -  Hypercholesteremia continue with current medication continue low-carb diet.  -Breast cancer in remission continue on anastrozole no medication side effects  -Hypothyroid continue the levothyroxine , compensated.  Follow-up 1 months         Hyperlipidemia  Pertinent negatives include no chest pain.   Hypertension  Pertinent negatives include no chest pain, headaches or palpitations.          Review of Systems   Constitutional:  Negative for fatigue, fever and unexpected weight change.   HENT:  Negative for congestion, ear discharge, ear pain, mouth sores, sinus pain and sore throat.    Eyes:  Negative for visual disturbance.   Respiratory:  Negative for cough and wheezing.    Cardiovascular:  Negative for chest pain, palpitations and leg swelling.   Gastrointestinal:  Negative for abdominal pain, blood in stool and diarrhea.   Genitourinary:  Negative for difficulty urinating.   Musculoskeletal:  Negative for arthralgias.   Skin:  Negative for rash.   Neurological:  Negative for dizziness and headaches.   Hematological:  Does not bruise/bleed easily.   Psychiatric/Behavioral:  Negative for behavioral problems.    All other  "systems reviewed and are negative.      Objective   No results found for: \"HGBA1C\"   /70   Pulse 57   Temp 36.2 °C (97.2 °F)   Wt 82 kg (180 lb 12.8 oz)   SpO2 99%   BMI 32.54 kg/m²   Lab Results   Component Value Date    WBC 9.5 10/02/2023    HGB 11.1 (L) 10/02/2023    HCT 35.0 (L) 10/02/2023     10/02/2023    CHOL 135 08/22/2022    TRIG 122 08/22/2022    HDL 47.0 08/22/2022    ALT 21 08/22/2022    AST 31 08/22/2022     04/24/2023    K 4.1 04/24/2023     04/24/2023    CREATININE 1.22 (H) 04/24/2023    BUN 40 (H) 04/24/2023    CO2 27 04/24/2023    TSH 2.96 08/22/2022     par   Physical Exam  Vitals and nursing note reviewed.   Constitutional:       Appearance: Normal appearance.   HENT:      Head: Normocephalic.      Nose: Nose normal.   Eyes:      Conjunctiva/sclera: Conjunctivae normal.      Pupils: Pupils are equal, round, and reactive to light.   Cardiovascular:      Rate and Rhythm: Regular rhythm.   Pulmonary:      Effort: Pulmonary effort is normal.      Breath sounds: Normal breath sounds.   Abdominal:      General: Abdomen is flat.      Palpations: Abdomen is soft.   Musculoskeletal:      Cervical back: Neck supple.   Skin:     General: Skin is warm.   Neurological:      General: No focal deficit present.      Mental Status: She is oriented to person, place, and time.   Psychiatric:         Mood and Affect: Mood normal.         Assessment/Plan   Lilly was seen today for hyperlipidemia, hypothyroidism, hypertension and flu vaccine.  Diagnoses and all orders for this visit:  Hypertension, unspecified type (Primary)  -     Comprehensive Metabolic Panel; Future  Gout, unspecified cause, unspecified chronicity, unspecified site  -     allopurinol (Zyloprim) 100 mg tablet; Take 2 tablets (200 mg) by mouth once daily.  Benign essential hypertension  -     amLODIPine (Norvasc) 2.5 mg tablet; Take 1 tablet (2.5 mg) by mouth once daily.  -     losartan (Cozaar) 100 mg tablet; Take 0.5 " tablets (50 mg) by mouth once daily.  Dyslipidemia  -     atorvastatin (Lipitor) 20 mg tablet; Take 1 tablet (20 mg) by mouth once daily.  Hypertension, uncontrolled  -     carvedilol (Coreg) 25 mg tablet; Take 1 tablet (25 mg) by mouth 2 times a day with meals.  Hypothyroidism, unspecified type  -     levothyroxine (Synthroid, Levoxyl) 50 mcg tablet; Take 1 tablet (50 mcg) by mouth once daily.  Anemia, unspecified type  -     folic acid (Folvite) 1 mg tablet; Take 1 tablet (1 mg) by mouth once daily.  GERD without esophagitis  -     omeprazole (PriLOSEC) 20 mg DR capsule; Take 1 capsule (20 mg) by mouth once daily in the morning. Take before meals. Before eating  Iron deficiency  -     ferrous sulfate 325 (65 Fe) MG tablet; Take 1 tablet (325 mg) by mouth 2 times a day.  Hypercholesteremia  -     Lipid Panel; Future  Other fatigue  -     TSH with reflex to Free T4 if abnormal; Future  Vitamin D deficiency, unspecified  -     Vitamin D 25-Hydroxy,Total (for eval of Vitamin D levels); Future  Vitamin B12 deficiency  Stage 3 chronic kidney disease due to benign hypertension (CMS/HCC)  Abnormal CT scan of lung  -     CT chest wo IV contrast; Future  Other orders  -     Follow Up In Primary Care - Established; Future   -Patient seen by hematology oncology chronic anemia needs to continue monitoring continue with vitamin B12 iron tablet  - Patient declined bone marrow biopsy aware of risk and benefit continue monitor conservatively  -Abnormal CT scan need to follow-up lung CT  -Chronic kidney disease, controlled continue with low-salt diet increase fluid intake no change continue monitoring  - Hypertension controlled.  Continue with current medication continue low-salt diet  -Need to proceed with mammogram  - Need to proceed with bone density as recommended follow-up results closely  - Chronic gout controlled continue current medication  -  Hypercholesteremia continue with current medication continue low-carb  diet.  -Breast cancer in remission continue on anastrozole no medication side effects  -Hypothyroid continue the levothyroxine , compensated.  Follow-up 1 months

## 2023-10-06 ENCOUNTER — HOSPITAL ENCOUNTER (OUTPATIENT)
Dept: RADIOLOGY | Facility: HOSPITAL | Age: 83
Discharge: HOME | End: 2023-10-06
Payer: MEDICARE

## 2023-10-06 DIAGNOSIS — R91.8 ABNORMAL CT SCAN OF LUNG: ICD-10-CM

## 2023-10-06 PROCEDURE — 71250 CT THORAX DX C-: CPT | Mod: MG

## 2023-10-06 PROCEDURE — 71250 CT THORAX DX C-: CPT | Performed by: RADIOLOGY

## 2023-10-07 LAB — PYRIDOXAL PHOS SERPL-SCNC: 149.4 NMOL/L (ref 20–125)

## 2023-10-10 ENCOUNTER — DOCUMENTATION (OUTPATIENT)
Dept: HEMATOLOGY/ONCOLOGY | Facility: HOSPITAL | Age: 83
End: 2023-10-10
Payer: MEDICARE

## 2023-10-10 NOTE — PROGRESS NOTES
In regards to my note on 10/2/2023, epic transitioned occurred to 10/2/2023 from prior EMR system.  I did not go back in the epic system prior to this point unless patient brought up an issue.  I did review the EMR data in epic from 10/2/2023 going forward, but relied on our old EMR system for data prior to the transition.

## 2023-10-16 ENCOUNTER — HOSPITAL ENCOUNTER (OUTPATIENT)
Dept: RADIOLOGY | Facility: HOSPITAL | Age: 83
Discharge: HOME | End: 2023-10-16
Payer: MEDICARE

## 2023-10-16 DIAGNOSIS — C50.911 MALIGNANT NEOPLASM OF UNSPECIFIED SITE OF RIGHT FEMALE BREAST (MULTI): ICD-10-CM

## 2023-10-16 DIAGNOSIS — Z12.31 ENCOUNTER FOR SCREENING MAMMOGRAM FOR MALIGNANT NEOPLASM OF BREAST: ICD-10-CM

## 2023-10-16 PROCEDURE — 77063 BREAST TOMOSYNTHESIS BI: CPT | Mod: 50

## 2023-10-16 PROCEDURE — 77063 BREAST TOMOSYNTHESIS BI: CPT | Mod: BILATERAL PROCEDURE | Performed by: RADIOLOGY

## 2023-10-16 PROCEDURE — 77067 SCR MAMMO BI INCL CAD: CPT | Mod: BILATERAL PROCEDURE | Performed by: RADIOLOGY

## 2023-11-02 ENCOUNTER — OFFICE VISIT (OUTPATIENT)
Dept: PRIMARY CARE | Facility: CLINIC | Age: 83
End: 2023-11-02
Payer: MEDICARE

## 2023-11-02 VITALS
BODY MASS INDEX: 32.79 KG/M2 | TEMPERATURE: 97.3 F | DIASTOLIC BLOOD PRESSURE: 70 MMHG | HEART RATE: 79 BPM | SYSTOLIC BLOOD PRESSURE: 120 MMHG | OXYGEN SATURATION: 98 % | WEIGHT: 182.2 LBS

## 2023-11-02 DIAGNOSIS — E78.00 HYPERCHOLESTEREMIA: ICD-10-CM

## 2023-11-02 DIAGNOSIS — E78.5 DYSLIPIDEMIA: ICD-10-CM

## 2023-11-02 DIAGNOSIS — R91.8 ABNORMAL CT LUNG SCREENING: Primary | ICD-10-CM

## 2023-11-02 DIAGNOSIS — E03.9 HYPOTHYROIDISM, UNSPECIFIED TYPE: ICD-10-CM

## 2023-11-02 PROCEDURE — 1160F RVW MEDS BY RX/DR IN RCRD: CPT | Performed by: INTERNAL MEDICINE

## 2023-11-02 PROCEDURE — 1036F TOBACCO NON-USER: CPT | Performed by: INTERNAL MEDICINE

## 2023-11-02 PROCEDURE — 1126F AMNT PAIN NOTED NONE PRSNT: CPT | Performed by: INTERNAL MEDICINE

## 2023-11-02 PROCEDURE — 99214 OFFICE O/P EST MOD 30 MIN: CPT | Performed by: INTERNAL MEDICINE

## 2023-11-02 PROCEDURE — 1159F MED LIST DOCD IN RCRD: CPT | Performed by: INTERNAL MEDICINE

## 2023-11-02 PROCEDURE — 3074F SYST BP LT 130 MM HG: CPT | Performed by: INTERNAL MEDICINE

## 2023-11-02 PROCEDURE — 3078F DIAST BP <80 MM HG: CPT | Performed by: INTERNAL MEDICINE

## 2023-11-02 ASSESSMENT — ENCOUNTER SYMPTOMS
HEADACHES: 0
DIZZINESS: 0
COUGH: 0
PALPITATIONS: 0
BLOOD IN STOOL: 0
FEVER: 0
DIFFICULTY URINATING: 0
SORE THROAT: 0
ABDOMINAL PAIN: 0
ARTHRALGIAS: 0
HYPERTENSION: 1
DIARRHEA: 0
UNEXPECTED WEIGHT CHANGE: 0
WHEEZING: 0
SINUS PAIN: 0
BRUISES/BLEEDS EASILY: 0
FATIGUE: 0

## 2023-11-02 NOTE — PROGRESS NOTES
Subjective   Patient ID: Llily Parr is a 83 y.o. female who presents for Hypertension and Results (BW).    -CT results are abnormal discussed with patient finding lung disease with multiple lung nodules no change from previous scans need to follow-up in 1 year repeat CT chest in October 2024  -Hypertension improved continue with current medication continue low-salt diet  -Chronic anemia continue with current vitamin B12 and vitamin B6  - Patient declined bone marrow biopsy aware of risk and benefit continue monitor conservatively  --Chronic kidney disease, controlled continue with low-salt diet increase fluid intake no change continue monitoring  -Mammogram reviewed normal results follow-up in 1 year  - Need to proceed with bone density as recommended follow-up results closely  - Chronic gout controlled continue current medication  -  Hypercholesteremia continue with current medication continue low-carb diet.  -Breast cancer in remission continue on anastrozole no medication side effects  -Hypothyroid continue the levothyroxine , compensated.  Follow-up 3 months      Hypertension  Pertinent negatives include no chest pain, headaches or palpitations.          Review of Systems   Constitutional:  Negative for fatigue, fever and unexpected weight change.   HENT:  Negative for congestion, ear discharge, ear pain, mouth sores, sinus pain and sore throat.    Eyes:  Negative for visual disturbance.   Respiratory:  Negative for cough and wheezing.    Cardiovascular:  Negative for chest pain, palpitations and leg swelling.   Gastrointestinal:  Negative for abdominal pain, blood in stool and diarrhea.   Genitourinary:  Negative for difficulty urinating.   Musculoskeletal:  Negative for arthralgias.   Skin:  Negative for rash.   Neurological:  Negative for dizziness and headaches.   Hematological:  Does not bruise/bleed easily.   Psychiatric/Behavioral:  Negative for behavioral problems.    All other systems reviewed  "and are negative.      Objective   No results found for: \"HGBA1C\"   /70   Pulse 79   Temp 36.3 °C (97.3 °F)   Wt 82.6 kg (182 lb 3.2 oz)   SpO2 98%   BMI 32.79 kg/m²     Physical Exam  Vitals and nursing note reviewed.   Constitutional:       Appearance: Normal appearance.   HENT:      Head: Normocephalic.      Nose: Nose normal.   Eyes:      Conjunctiva/sclera: Conjunctivae normal.      Pupils: Pupils are equal, round, and reactive to light.   Cardiovascular:      Rate and Rhythm: Regular rhythm.   Pulmonary:      Effort: Pulmonary effort is normal.      Breath sounds: Normal breath sounds.   Abdominal:      General: Abdomen is flat.      Palpations: Abdomen is soft.   Musculoskeletal:      Cervical back: Neck supple.   Skin:     General: Skin is warm.   Neurological:      General: No focal deficit present.      Mental Status: She is oriented to person, place, and time.   Psychiatric:         Mood and Affect: Mood normal.         Assessment/Plan   Lilly was seen today for hypertension and results.  Diagnoses and all orders for this visit:  Abnormal CT lung screening (Primary)  Dyslipidemia  Hypothyroidism, unspecified type  Hypercholesteremia  Other orders  -     Follow Up In Primary Care - Established   -CT results are abnormal discussed with patient finding lung disease with multiple lung nodules no change from previous scans need to follow-up in 1 year repeat CT chest in October 2024  -Hypertension improved continue with current medication continue low-salt diet  -Chronic anemia continue with current vitamin B12 and vitamin B6  - Patient declined bone marrow biopsy aware of risk and benefit continue monitor conservatively  --Chronic kidney disease, controlled continue with low-salt diet increase fluid intake no change continue monitoring  -Mammogram reviewed normal results follow-up in 1 year  - Need to proceed with bone density as recommended follow-up results closely  - Chronic gout controlled " continue current medication  -  Hypercholesteremia continue with current medication continue low-carb diet.  -Breast cancer in remission continue on anastrozole no medication side effects  -Hypothyroid continue the levothyroxine , compensated.  Follow-up 3 months

## 2023-11-03 ENCOUNTER — LAB (OUTPATIENT)
Dept: LAB | Facility: LAB | Age: 83
End: 2023-11-03
Payer: MEDICARE

## 2023-11-03 DIAGNOSIS — I12.9 HYPERTENSIVE CHRONIC KIDNEY DISEASE WITH STAGE 1 THROUGH STAGE 4 CHRONIC KIDNEY DISEASE, OR UNSPECIFIED CHRONIC KIDNEY DISEASE: ICD-10-CM

## 2023-11-03 DIAGNOSIS — I10 ESSENTIAL (PRIMARY) HYPERTENSION: Primary | ICD-10-CM

## 2023-11-03 DIAGNOSIS — D64.9 ANEMIA, UNSPECIFIED: ICD-10-CM

## 2023-11-03 LAB
ALBUMIN SERPL BCP-MCNC: 4.1 G/DL (ref 3.4–5)
ANION GAP SERPL CALC-SCNC: 13 MMOL/L (ref 10–20)
BUN SERPL-MCNC: 21 MG/DL (ref 6–23)
CALCIUM SERPL-MCNC: 9.5 MG/DL (ref 8.6–10.3)
CHLORIDE SERPL-SCNC: 106 MMOL/L (ref 98–107)
CO2 SERPL-SCNC: 26 MMOL/L (ref 21–32)
CREAT SERPL-MCNC: 1.07 MG/DL (ref 0.5–1.05)
CREAT UR-MCNC: 92.5 MG/DL (ref 20–320)
GFR SERPL CREATININE-BSD FRML MDRD: 51 ML/MIN/1.73M*2
GLUCOSE SERPL-MCNC: 104 MG/DL (ref 74–99)
MICROALBUMIN UR-MCNC: 30 MG/L
MICROALBUMIN/CREAT UR: 32.4 UG/MG CREAT
PHOSPHATE SERPL-MCNC: 3.4 MG/DL (ref 2.5–4.9)
POTASSIUM SERPL-SCNC: 4.1 MMOL/L (ref 3.5–5.3)
SODIUM SERPL-SCNC: 141 MMOL/L (ref 136–145)

## 2023-11-03 PROCEDURE — 82570 ASSAY OF URINE CREATININE: CPT

## 2023-11-03 PROCEDURE — 36415 COLL VENOUS BLD VENIPUNCTURE: CPT

## 2023-11-03 PROCEDURE — 82043 UR ALBUMIN QUANTITATIVE: CPT

## 2023-11-06 ENCOUNTER — OFFICE VISIT (OUTPATIENT)
Dept: NEPHROLOGY | Facility: CLINIC | Age: 83
End: 2023-11-06
Payer: MEDICARE

## 2023-11-06 VITALS
OXYGEN SATURATION: 96 % | TEMPERATURE: 97.7 F | BODY MASS INDEX: 32.07 KG/M2 | DIASTOLIC BLOOD PRESSURE: 76 MMHG | SYSTOLIC BLOOD PRESSURE: 155 MMHG | WEIGHT: 181 LBS | HEART RATE: 64 BPM | HEIGHT: 63 IN | RESPIRATION RATE: 16 BRPM

## 2023-11-06 DIAGNOSIS — I10 BENIGN ESSENTIAL HYPERTENSION: ICD-10-CM

## 2023-11-06 DIAGNOSIS — N18.30 STAGE 3 CHRONIC KIDNEY DISEASE DUE TO BENIGN HYPERTENSION (MULTI): Primary | ICD-10-CM

## 2023-11-06 DIAGNOSIS — E21.3 HYPERPARATHYROIDISM (MULTI): ICD-10-CM

## 2023-11-06 DIAGNOSIS — I12.9 STAGE 3 CHRONIC KIDNEY DISEASE DUE TO BENIGN HYPERTENSION (MULTI): Primary | ICD-10-CM

## 2023-11-06 DIAGNOSIS — E55.9 VITAMIN D DEFICIENCY: ICD-10-CM

## 2023-11-06 PROCEDURE — 99214 OFFICE O/P EST MOD 30 MIN: CPT | Performed by: INTERNAL MEDICINE

## 2023-11-06 PROCEDURE — 1160F RVW MEDS BY RX/DR IN RCRD: CPT | Performed by: INTERNAL MEDICINE

## 2023-11-06 PROCEDURE — 3078F DIAST BP <80 MM HG: CPT | Performed by: INTERNAL MEDICINE

## 2023-11-06 PROCEDURE — 1159F MED LIST DOCD IN RCRD: CPT | Performed by: INTERNAL MEDICINE

## 2023-11-06 PROCEDURE — 1036F TOBACCO NON-USER: CPT | Performed by: INTERNAL MEDICINE

## 2023-11-06 PROCEDURE — 1126F AMNT PAIN NOTED NONE PRSNT: CPT | Performed by: INTERNAL MEDICINE

## 2023-11-06 PROCEDURE — 3077F SYST BP >= 140 MM HG: CPT | Performed by: INTERNAL MEDICINE

## 2023-11-06 NOTE — PATIENT INSTRUCTIONS
Dear MAITE   It was nice seeing you in the nephrology clinic today     Today we discussed the following:     # Chronic kidney disease stage 3 ~ 30-40 percent-now improved and up to 51%-good job  # Anemia: Improved with no symptoms  # Hypertension: Good per home blood pressure readings but high today in office     Plan  - Please follow healthy life style, watch salt in diet, avoid soy sauce and processed meat, limit soda drinks. Exercise regularly. No smoking. Check your blood pressure daily - same time of the day - and write numbers down. Please bring log with you next visit.     - Continue Losartan and carvedilol and amlodipine 2.5 mg      I will see you in 12 months with another repeat blood work and urine analysis.       Please call our office if you have any question  Thank you for coming to see me today     Mari Pierre MD, MS, JAYY PAIGE  Clinical  - [employer]  Nephrologist - [facility] - [facility]

## 2023-11-06 NOTE — PROGRESS NOTES
Subjective     Ms Keshav is 84y/o/c/f w PMH of Br Ca s/p surgery 2015, Anemia, HTN, HLD, gout, hypothyroid, prior COVID 19 infection, mitral valve calcification abd CKD who is coming today as CKD follow-up    Last office visit April 2023.  Lilly came today with her .  No kidney related complaints or concerns.  No leg swelling or shortness of breath.  Good adherence to medications and adequate hydration.  Most recent blood work done few days ago we was discussed with him including stable serum creatinine 1 and GFR 51 with a normal electrolytes and no significant acidosis.  Improved anemia hemoglobin 11.1.  No significant protein leak in the urine.  Overall she is stable and enjoying good health    Per prior notes     Last office visit September 2022. In the interim-continues to be stable. Lilly came today with her . No lower urine tract symptoms. She has mild leg swelling-stable. No lower urinary tract symptoms. She had blood work done this morning and all results were discussed with her in details including stable serum creatinine and GFR and within normal electrolytes. Uric acid is normal-on allopurinol.. She continues to check blood pressure and ure is elevated today in office 182/83-asymptomatic at home and average reading 130-80. She is adherent to medications and low-salt diet        Her prior notes     Last office visit February 2022. In interim, she continues to do well. She checks blood pressure regularly at home and average reading 120-130/70. Recently she had amlodipine 2.5 mg added to her medication list. No symptoms or complaints today. She had blood work done last month and all results were discussed with her and her  in detail today including stable improving serum creatinine 1.1 and GFR 47 and within normal electrolytes. Within normal magnesium and vitamin D. No significant albuminuria. Improving hemoglobin 11.3-asymptomatic. No pain or burning with urination. No lower urinary  "tract symptoms. No recent gout flares. She is adherent to medications and low-salt diet     Her prior notes     Last office visit August 2021. In the interim no events. She continues to be adherent to blood pressure medications. She checks blood pressure frequently at home and average reading 130/70 per her and her 's report. She was surprised to see blood pressure elevated today to 200. She is asymptomatic. She follows low-salt diet. Reviewed blood work done in February 2022 was discussed with patient and  today. Serum creatinine improved 1.2, GFR improved to 44 mils per minute per 1.73 mÂ², anemia improved and iron storage improved. Urinalysis with mild albuminuria. Discussed healthy lifestyle with patient and  today. Overall she is doing great     Per prior notes     Last office visit February 2021. Hemoglobin was 7.1 with low iron storage. She received IV iron at [facility]. Repeat blood work showed improved CBC and improved iron storage. Today, she came with her . She reports feeling anxious today as her  was driving fast. Blood pressure in office today is high. She reports she checks blood pressure at home frequently and average reading 130/80. She is adherent to low-salt diet and blood pressure medications. She feels in baseline state of health. No leg swelling or shortness of breath. She follows up with hematology for breast cancer. No complaints or concerns today     Per prior notes     Patient is coming today with her   checks BP at home 130/60s  No issues urination   No recent gout attacks  Tylenol only of pian   She does not follow low salt diet      Fam Hx : no kidney issues,   Social:  for 63 years, 4 sons, never smoker, no wine or drugs        Objective   /76 (Patient Position: Standing)   Pulse 64   Temp 36.5 °C (97.7 °F)   Resp 16   Ht 1.6 m (5' 3\")   Wt 82.1 kg (181 lb)   SpO2 96%   BMI 32.06 kg/m²   Wt Readings from Last 3 " Encounters:   11/06/23 82.1 kg (181 lb)   11/02/23 82.6 kg (182 lb 3.2 oz)   10/04/23 82 kg (180 lb 12.8 oz)       Physical Exam    General appearance: no distress awake and alert on room air, euvolemic on exam  Eyes: non-icteric  HEENT: atrumatic head, PEERLA, moist mucosa  Skin: no apparent rash  Heart: NSR, S1, S2 normal, no murmur or gallop  Lungs: Symmetrical expansion,CTA bilat no wheezing/crackles  Abdomen: soft, nt/nd, obese  Extremities: no edema bilat  Neuro: No FND,asterixis, no focal deficits noticed        Review of Systems     Constitutional: no fever, no chills, no recent weight gain and no recent weight loss.   Eyes: no blurred vision and no diplopia.   ENT: no hearing loss, no earache, no sore throat, no swollen glands in the neck and no nasal discharge.   Cardiovascular: no chest pain, no palpitations and no lower extremity edema.   Respiratory: no shortness of breath, no chronic cough and no shortness of breath during exertion.   Gastrointestinal: no abdominal pain, no constipation, no heartburn, no vomiting, no bloody stools and no change in bowel movements.   Genitourinary: no dysuria and no hematuria.   Musculoskeletal: no arthralgias and no myalgias.   Skin: no rashes and no skin lesions.   Neurological: no headaches and no dizziness.   Psychiatric: no confusion, no depression and no anxiety.   Endocrine: no heat intolerance, no cold intolerance, appetite not increased, no thyroid disorder, no increased urinary frequency and no dry skin.   Hematologic/Lymphatic: does not bleed easily and does not bruise easily.   All other systems have been reviewed and are negative for complaint.         Data Review               Results from last 7 days   Lab Units 11/03/23  0757   SODIUM mmol/L 141   POTASSIUM mmol/L 4.1   CHLORIDE mmol/L 106   CO2 mmol/L 26   BUN mg/dL 21   CREATININE mg/dL 1.07*   EGFR mL/min/1.73m*2 51*   GLUCOSE mg/dL 104*   CALCIUM mg/dL 9.5   PHOSPHORUS mg/dL 3.4       Lab Results  "  Component Value Date    URICACID 4.0 04/24/2023       No components found for: \"[id number]\"      No results found for: \"HGBA1C\"      Lab Results   Component Value Date    CALCIUM 9.5 11/03/2023    PHOS 3.4 11/03/2023         No results found for requested labs within last 365 days.      Albumin/Creatine Ratio   Date Value Ref Range Status   11/03/2023 32.4 ug/mg Creat Final   04/24/2023 25.0 0.0 - 30.0 ug/mg crt Final   08/22/2022 54.2 (H) 0.0 - 30.0 ug/mg crt Final             Assessment and Plan     Ms Parr is 82y/o/c/f w PMH of Br Ca s/p surgery 2015, Anemia, HTN, HLD, gout, hypothyroid, prior COVID 19 infection, mitral valve calcification abd CKD who is coming today as CKD follow-up    Impression      # Chronic kidney disease - G3b A1-now improved to G3A/A1-  - Baseline SCr 1.3-1.4, GFR 30-40- most recent serum creatinine is 1 in November 13, 2023  - Most likely related to ASCVD  - Urine dipstick in office earlier showed no Albumin, no blood, positive asymptomatic LE. Spot test albumin creatinine ratio 32 mg/mg  -Kidney imaging done in February 2021 was normal size kidneys bilateral and no blockage or obstructions no masses  - Lyes : Within normal sodium, potassium and no significant acidosis     # BP - today at office is above her home average at home? Whitecoat syndrome,negative orthostatic vitals   - average home reading 130/70s  - Current meds: Carvedilol 25 mgx2, Losartan 50 mg, amlodipine 2.5 mg-positive leg swelling  - No Changes. Instructed to bring blood pressure log with her next visit        #Anemia Hb ~ 8 improved to 11.5 and has been stable-a symptomatic. Anemia is CKD related and in part iron deficiency anemia.She refused BM biopsy. She had remote GIB 20 yrs ago. She underwent IV iron infusion  -Repeat iron storage-improved. Continue p.o. iron. No indication for MORGAN at this time  -Kappa/lambda ratio is elevated 1.9 accepted to her GFR.  We will continue to monitor     #BMD   - wnl Ca, Phos, " VIT D, PTH     # CVS  - On statins        #Others  - No NSAIDs, no contrast as possible. If to be done- we recommend holding ACEi/ARBS/diuretics 24 hrs prior to contrast exposure and ensure appropriate hydration   - Ensure well hydration  - Limit salt in diet  - No smoking         f/u in 6 months         Mari Pierre MD, MS, JAYY PAIGE  Clinical  - [employer]  Nephrologist - [facility] - [facility]

## 2023-11-08 DIAGNOSIS — E61.1 IRON DEFICIENCY: ICD-10-CM

## 2023-11-08 RX ORDER — FERROUS SULFATE TAB 325 MG (65 MG ELEMENTAL FE) 325 (65 FE) MG
1 TAB ORAL 2 TIMES DAILY
Qty: 180 TABLET | Refills: 0 | Status: SHIPPED | OUTPATIENT
Start: 2023-11-08 | End: 2024-02-06 | Stop reason: SDUPTHER

## 2023-12-27 DIAGNOSIS — E03.9 HYPOTHYROIDISM, UNSPECIFIED TYPE: ICD-10-CM

## 2023-12-27 RX ORDER — LEVOTHYROXINE SODIUM 50 UG/1
50 TABLET ORAL DAILY
Qty: 90 TABLET | Refills: 1 | Status: SHIPPED | OUTPATIENT
Start: 2023-12-27 | End: 2024-02-06 | Stop reason: SDUPTHER

## 2024-01-09 ENCOUNTER — OFFICE VISIT (OUTPATIENT)
Dept: HEMATOLOGY/ONCOLOGY | Facility: HOSPITAL | Age: 84
End: 2024-01-09
Payer: MEDICARE

## 2024-01-09 ENCOUNTER — LAB (OUTPATIENT)
Dept: LAB | Facility: LAB | Age: 84
End: 2024-01-09
Payer: MEDICARE

## 2024-01-09 VITALS
DIASTOLIC BLOOD PRESSURE: 72 MMHG | HEIGHT: 63 IN | HEART RATE: 75 BPM | WEIGHT: 174.82 LBS | BODY MASS INDEX: 30.98 KG/M2 | SYSTOLIC BLOOD PRESSURE: 127 MMHG | RESPIRATION RATE: 16 BRPM | TEMPERATURE: 97.2 F | OXYGEN SATURATION: 98 %

## 2024-01-09 DIAGNOSIS — D64.9 ANEMIA, UNSPECIFIED TYPE: ICD-10-CM

## 2024-01-09 DIAGNOSIS — E53.1 VITAMIN B6 DEFICIENCY: ICD-10-CM

## 2024-01-09 DIAGNOSIS — I10 BENIGN ESSENTIAL HYPERTENSION: ICD-10-CM

## 2024-01-09 DIAGNOSIS — E61.1 IRON DEFICIENCY: ICD-10-CM

## 2024-01-09 DIAGNOSIS — E53.8 VITAMIN B12 DEFICIENCY: ICD-10-CM

## 2024-01-09 DIAGNOSIS — C50.911 MALIGNANT NEOPLASM OF RIGHT FEMALE BREAST, UNSPECIFIED ESTROGEN RECEPTOR STATUS, UNSPECIFIED SITE OF BREAST (MULTI): Primary | ICD-10-CM

## 2024-01-09 LAB
BASOPHILS # BLD AUTO: 0.05 X10*3/UL (ref 0–0.1)
BASOPHILS NFR BLD AUTO: 0.6 %
EOSINOPHIL # BLD AUTO: 0.5 X10*3/UL (ref 0–0.4)
EOSINOPHIL NFR BLD AUTO: 6 %
ERYTHROCYTE [DISTWIDTH] IN BLOOD BY AUTOMATED COUNT: 14.4 % (ref 11.5–14.5)
FERRITIN SERPL-MCNC: 1091 NG/ML (ref 8–150)
HCT VFR BLD AUTO: 34.2 % (ref 36–46)
HGB BLD-MCNC: 10.7 G/DL (ref 12–16)
HGB RETIC QN: 29 PG (ref 28–38)
IMM GRANULOCYTES # BLD AUTO: 0.05 X10*3/UL (ref 0–0.5)
IMM GRANULOCYTES NFR BLD AUTO: 0.6 % (ref 0–0.9)
IMMATURE RETIC FRACTION: 11.4 %
IRON SATN MFR SERPL: 17 % (ref 25–45)
IRON SERPL-MCNC: 38 UG/DL (ref 35–150)
LYMPHOCYTES # BLD AUTO: 1.49 X10*3/UL (ref 0.8–3)
LYMPHOCYTES NFR BLD AUTO: 18 %
MCH RBC QN AUTO: 30.9 PG (ref 26–34)
MCHC RBC AUTO-ENTMCNC: 31.3 G/DL (ref 32–36)
MCV RBC AUTO: 99 FL (ref 80–100)
MONOCYTES # BLD AUTO: 0.5 X10*3/UL (ref 0.05–0.8)
MONOCYTES NFR BLD AUTO: 6 %
NEUTROPHILS # BLD AUTO: 5.69 X10*3/UL (ref 1.6–5.5)
NEUTROPHILS NFR BLD AUTO: 68.8 %
NRBC BLD-RTO: 0 /100 WBCS (ref 0–0)
PLATELET # BLD AUTO: 252 X10*3/UL (ref 150–450)
RBC # BLD AUTO: 3.46 X10*6/UL (ref 4–5.2)
RETICS #: 0.05 X10*6/UL (ref 0.02–0.11)
RETICS/RBC NFR AUTO: 1.3 % (ref 0.5–2)
TIBC SERPL-MCNC: 228 UG/DL (ref 240–445)
UIBC SERPL-MCNC: 190 UG/DL (ref 110–370)
WBC # BLD AUTO: 8.3 X10*3/UL (ref 4.4–11.3)

## 2024-01-09 PROCEDURE — 83540 ASSAY OF IRON: CPT

## 2024-01-09 PROCEDURE — 36415 COLL VENOUS BLD VENIPUNCTURE: CPT

## 2024-01-09 PROCEDURE — 3074F SYST BP LT 130 MM HG: CPT | Performed by: INTERNAL MEDICINE

## 2024-01-09 PROCEDURE — 82728 ASSAY OF FERRITIN: CPT

## 2024-01-09 PROCEDURE — 85025 COMPLETE CBC W/AUTO DIFF WBC: CPT

## 2024-01-09 PROCEDURE — 83521 IG LIGHT CHAINS FREE EACH: CPT

## 2024-01-09 PROCEDURE — 1036F TOBACCO NON-USER: CPT | Performed by: INTERNAL MEDICINE

## 2024-01-09 PROCEDURE — 1126F AMNT PAIN NOTED NONE PRSNT: CPT | Performed by: INTERNAL MEDICINE

## 2024-01-09 PROCEDURE — 85045 AUTOMATED RETICULOCYTE COUNT: CPT

## 2024-01-09 PROCEDURE — 83550 IRON BINDING TEST: CPT

## 2024-01-09 PROCEDURE — 3078F DIAST BP <80 MM HG: CPT | Performed by: INTERNAL MEDICINE

## 2024-01-09 PROCEDURE — 1159F MED LIST DOCD IN RCRD: CPT | Performed by: INTERNAL MEDICINE

## 2024-01-09 PROCEDURE — 1160F RVW MEDS BY RX/DR IN RCRD: CPT | Performed by: INTERNAL MEDICINE

## 2024-01-09 PROCEDURE — 99214 OFFICE O/P EST MOD 30 MIN: CPT | Performed by: INTERNAL MEDICINE

## 2024-01-09 ASSESSMENT — PAIN SCALES - GENERAL: PAINLEVEL: 0-NO PAIN

## 2024-01-09 NOTE — PROGRESS NOTES
Interval History:    Patient is an 83 -year-old white female previously seen by Dr. Keller, seen by me for the first time on 12/22/2020, for history of s tage I ER/WI positive and HER-2/garry negative right breast cancer status post lumpectomy 5/2016 followed by adjuvant Arimidex starting 6/2016  which she tolerated without side effects and was supposed to complete a 5-year course 6/2021 but decided to finish out her last prescription which  was done 9/2021  (did not feel any different off the anastrozole), adjuvant radiation not recommended per tumor board, also a history of  anemia due to renal dysfunction and iron deficiency status  post IV iron but could not rule out MDS given elevated MCV (patient refused bone marrow biopsy) , October 2020 noted to have an elevated methylmalonic acid and B12 only 300s and she was started on oral B12 by PCP 11/2020  which was the first time she ever took B12, 12/2020 started on folic acid because of an elevated reticulocyte count and B6 for a borderline  level.     She received Feraheme in June 2020, March/April 2021 ordered by her nephrologist, 9/2021 ordered by me, again February-March 2023-she  felt that this helped her energy.   7/2020 DEXA scan was normal.   10/16/2023 bilateral mammogram recommended 1 year follow-up.    10/4/2023 patient saw PCP for chronic medical issues and ordered a CT chest which showed chronic changes 10/6/2023, 11/2/2023 saw PCP who reviewed CT chest and recommended follow-up on that in 1 year October 2024.  10/26/2020 PCP recommended restarting oral iron  and she did this, no side effects, has intermittently taken oral iron since 2017/2018, continues to take this at 1 tablet/day.  She has hypothyroidism, managed by PCP .     She saw nephrology on 2/8/2021 for chronic  kidney disease, anemia, hypertension-follow-up renal ultrasound no evidence of disease 2/18/2021-patient saw nephrology 4/24/2023 with no need for Aranesp at that time, had a follow-up  11/6/2023 recommending continuing oral iron and follow-up in 6 months.  Patient last had a transfusion in her 50s,  have been considering if hemoglobin less than 7 but that has not been the case recently.  1/2021 patient had eosinophilia, only new medications were vitamin B12, B6, folate, did not have any allergy symptoms, did not try any Claritin, better 4/2021, eosinophils  again elevated 8/2022 on labs by another provider with no significant allergy symptoms, had a candidiasis rash under her breasts, declined further evaluation.  9/2022 I gave her nystatin topical for her breast candidiasis which helps, uses intermittently.   She has a history of Harp's, started taking omeprazole 12/2021, saw Dr. Hood 1/10/2022 with dysphagia/GERD recommending continue PPI and diet modification, follow-up if symptoms persist, no plan for endoscopy, symptoms subsequently improved.  She  lives with her , is  active and can do chores, has a card group playing [place] once a month.  She is accompanied by her  today.  She has canceled and rescheduled some of her appointments at times with me, for example most recently 6/2022  and 12/12/2022.     She received the flu vaccine in October/November 2023 and is planning on getting this soon.  10/2/2023 she declined further COVID vaccines, did get a booster most recently around January 2023.     I recommended ongoing vaccines and COVID measures through PCP 10/2/2023.  No other  hospitalization, major illness, or procedure since her last visit on 10/2/2023.  I have reviewed the available laboratory data, radiographic  data, medications, and notes, and have summarized them in this note 1/9/2024.      No fevers, hot flashes, unintentional weight loss, headaches, sore throat, acute vision changes, chest pain, edema now,  shortness breath, cough, nausea or vomiting, abnormal bowel movements, abdominal pain, blood in stool, dysuria or hematuria, bone/back/muscle/joint pain  now,  numbness, focal weakness, tingling, lumps, abnormal bruising or bleeding, diabetes but elevated glucose noted in chart, anxiety or depression, breast masses, vaginal discharge or bleeding.    Epic transition occurred 10/2/2023 from prior EMR system.  I did not go back in the epic system prior to this point unless patient brought up an issue.  I did review the EMR data in epic from 10/2/2023 going forward, but relied on our old EMR system for data prior to the transition.     Hematology-oncology history (reviewed and updated 1/9/2024)  -0250-5540 hemoglobin 11s  -3414-0534: Hemoglobin 11s-12s, B12 300s  -2014: Hemoglobin 10.7-11.7, iron 17%, ferritin 85  -2015: Hemoglobin 10.9, B12 400s  -2016: Normal WBC and platelets, hemoglobin 10.8-11, MCV 97-99, negative SPEP, normal LDH, B12 300s  -4/13/2016 right breast biopsy at 9:00 showed invasive carcinoma with ductal and lobular features, grade 1, ER positive greater than 95%, AZ +30%, negative HER-2/garry  -5/10/2016 right breast lumpectomy and sentinel lymph node biopsy showed 3 sentinel lymph nodes negative, right lumpectomy with invasive carcinoma ductal and lobular features, 1.2 cm, grade 1, low-grade DCIS, no lymphovascular invasion, negative margins;  pT1cN0  -5/2016 breast tumor board noted that she had cT1aN0 breast cancer but pT1c, radiation not recommended but hormonal therapy recommended.  -2017: Normal WBC and platelets, hemoglobin 11s down to 8.6 after hip surgery, MCV , negative TTG, B12 300s  -2018: WBC up to 15.3, hemoglobin 9.1, , normal platelets.  Erythropoietin 25.6.  Negative stool occult.  Iron 6%, ferritin 266, normal folate, B12 410, reticulocyte count 2.4  -12/2018 patient was seen by Dr. Hood noting bleeding ulcer in the past, Celestine's ulcers in 9/2014, colonoscopy done in 9/2014  -1/2019 EGD showed Z-line variable, hiatal hernia, no ulcers; pathology showed mild reactive gastropathy, distal esophagus with intestinal metaplasia  without dysplasia consistent with Harp's esophagus  -2019: Normal WBC and platelets, hemoglobin 8.4-9.9, MCV .  Creatinine 1.19.  Ferritin 120s-412, iron 14-26%.  -February and May 2020: WBC 7s, hemoglobin 9.3-9.6, , platelet 196-244, mildly elevated neutrophils 0.47.  Iron 19-20%, ferritin 156-357.  -7/2020 DEXA scan was normal  -10/2020: WBC 7.6, hemoglobin 7.7, , platelets 237, elevated eosinophils 650.  Creatinine 2.39-2.18, potassium 5.7-6, normal calcium, normal LFTs, normal TSH.  Homocystine elevated 29.  Normal folate 14.8.  Iron 21%.  B12 300s, elevated methylmalonic  acid 516.  -10/26/2020 PCP recommended restarting oral iron.  -11/2020: WBC 9.6, hemoglobin 7.5, , platelets 250s, elevated neutrophils, elevated monocytes 0.92.  Normal sodium and potassium, creatinine 1.39, normal calcium, glucose 131, positive Covid.  -I initially saw the patient on 12/22/2020, previously seen by Dr. Keller, for history of stage I ER/FL positive and HER-2/garry negative right breast cancer status post lumpectomy 5/2016 followed by adjuvant Arimidex starting 6/2016, adjuvant radiation  not recommended per tumor board, also a history of anemia due to renal dysfunction and iron deficiency status post IV iron but could not rule out MDS given elevated MCV.  Her breast cancer was found in the setting of an abnormal screening mammogram.   EGD and colonoscopy were negative for bleeding or malignancy 8/2013 according to notes, did have 2014 colonoscopy and EGD noted above, also EGD 1/2019; patient said she had bleeding ulcers when  she was in her 50s requiring transfusion but no transfusion since then, and that her colonoscopy 9/2014 was okay.  Patient received Feraheme 510 mg x 2 doses in February-March 2019, June 2019, December 2019, June 2020.  On review of labs, I noted her B12 level was borderline-she started taking oral B12 from PCP 11/2020 which was the first time she ever took B12, never took  B12  injections.  She had intermittently been on oral iron since 2017/2018, restarted this most recently 10/2022 tablets/day.  She never took folate.  She was anemic for several years.  She did eat red meat.  She denied any bleeding except her bleeding ulcers  in her 50s requiring transfusion.  She had pica for ice in her 50s but since then none.  No blood donation.  Her previous oncologist discussed Procrit but as she felt well and was not overly  enthusiastic according to his notes, he had a high threshold for initiated Procrit treatment by mutual agreement.     -12/22/2020: WBC 7.7, hemoglobin 7.9, , platelet 321, AEC 0.53.  Normal copper 127, folate 11, iron 22%, TIBC low, ferritin 262, normal methylmalonic acid 294, negative intrinsic factor antibody and parietal cell antibody, negative hepatitis panel,  negative celiac panel.  Normal LDH.  Reticulocyte count 3.2%.  B12 greater than 2000.  B6 was low at 12.6.  Vitamin D normal at 35.  Homocystine was 14 which was lower. Folic acid was recommended for elevated reticulocyte count and B6 for low level.   -1/2021 bone marrow biopsy was recommended but patient refused, noted eosinophilia with no new medications except for vitamins and no allergy symptoms.   -1/2021: WBC 8-10s, hemoglobin 7.8-8.4, -105, platelet 200s, elevated neutrophils, elevated eosinophils up to 1.8s then down to 1.5s.  -2/2/2021 CT chest with no effusion, severe mitral valve calcification, large hiatal hernia, 2 mm nodule right lower lobe, atelectasis.   -2/2021: WBC 7.8, hemoglobin 8.1, , platelet 251, eosinophils 0.66.  Iron 9% with ferritin 144, normal PTH intact 73.   -3/2021: WBC 6.7-9.0, hemoglobin 7.1-8.5, MCV , platelet 200s.  Iron 10%, ferritin 76.  Eosinophils 0.62-0.54, elevated neutrophils.  -Patient received Feraheme 3/31/2021 and 4/7/2021 by nephrology.   -4/7/2021, 4/19/2021: WBC 7.4-8.5, hemoglobin 7.8 up to 8.6, -104, platelet 235-261.   Eosinophils 0.76 down to 0.59.   -4/2021: Negative UPEP, SPEP, HIV.  Zinc was 105.  B6 was elevated 295-her dose was decreased from 50 mg daily down to 25 mg daily.  B1 normal 103.  B12 elevated.  Reticulocyte count 2.0%.  Kappa light chain elevated 7.26, lambda 3.21, ratio 2.26, her  first light chains ever.  Iron 23% with ferritin 664.  Erythropoietin 13.8.  Negative GISSELL/GISSELL.   -5/2021: WBC 7.9, hemoglobin 9.3, , platelet 237, eosinophils 0.88.   -6/4/2021: WBC 7.7, hemoglobin 9.4, , platelet 237, eosinophils 0.67.   -8/9/2021: WBC 8.1, hemoglobin 9.6, , platelet 250.  Iron 15% with ferritin 214.  Normal sodium and potassium, creatinine 1.4, calcium 9.9.   -9/2021 patient finished her anastrozole   -9/2021 Feraheme 510 mg x 2 doses were given.   -10/1/2021: Normal sodium and potassium, creatinine 1.3 stable, calcium 10.2, normal LFTs, normal lipids, normal TSH   -12/14/2021: WBC 6.6, hemoglobin 9.8 improved, , platelet 220, ANC 4.08, eosinophils 0.53 not significant.  Iron 21% with ferritin 676.  Elevated B12.  Normal homocystine 10.  B6 elevated 255.  Kappa 6.36, lambda 3.39, ratio 1.88, overall improved  versus April 2021.  -2/18/2022: WBC 9.0, hemoglobin improved 11.1, , platelet 235.  Normal sodium, potassium 4.1, creatinine 1.2 stable, calcium 10.1.  Normal phosphorus.  Magnesium 1.6.  Iron 24% with ferritin 512 which was slightly lower.  PTH intact normal 73.   -8/22/2022: WBC 9.2, hemoglobin better 11.3,  stable, platelets 218, eosinophils 1.53.  Normal sodium and potassium, creatinine 1.1, normal calcium.  Magnesium 1.6.  Normal phosphorus and LFTs.  Iron 29% with ferritin stable 694.  B12 elevated.   Normal TSH.  Vitamin D was 40.   -September-October 2022: WBC 11.8 down to 9.0, hemoglobin 11.0-11.1, -104, platelets 200 range, ANC 8.3 down to 5.1.  Eosinophils 1.07-1.17.  Eosinophilia was better.   -2/20/2023: WBC 9.5, hemoglobin 11.2, , platelet  242, eosinophils better 0.96.  Elevated neutrophils.  Iron 17% with ferritin 593 status post Feraheme 2/24 and 3/3 .  Elevated B12.  B6 elevated 276, told her she could decrease by half.  Reticulocyte count 1.6.  Kappa 6.72, lambda 3.49, ratio 1.93 overall  stable.  I told her she could go down to iron 1 tablet/day as 2 tablets/day are likely not absorbed any better.   -4/24/23: WBC 9.2, hemoglobin 11.5, , platelet 242, no differential.  Normal sodium, potassium 4.1, creatinine 1.2, calcium 10.1.  Normal phosphorus and uric acid.  Normal iron 42% with ferritin elevated 1121.  -10/2/2023-11/3/2023: WBC 9.5, hemoglobin 11.1, , platelet 243, elevated neutrophils, eosinophils 0.79 decreased.  Normal sodium and potassium, creatinine 1.07-1.1, normal calcium, normal LFTs, iron 23% with ferritin 969.  Elevated B12.  Elevated B6 at 149 which was okay.  Normal TSH.  Vitamin D = 36.  -10/6/2023 CT chest by PCP showed granulomatous changes, GERD, calcifications mitral valve and aortic valve, chronic groundglass opacities right lower lobe, paraesophageal hernia, stable nodules scattered dating back to 2021 felt to be benign.     Past medical history  -Breast cancer as above  -Anemia with macrocytosis and iron deficiency as above, borderline B12  level as above started on supplement 11/2020, elevated homocystine as above , B6 deficiency 12/2020  -Chronic kidney disease.  2/18/2021 renal ultrasound showed right kidney 9 cm, left kidney 8.6 cm, no hydronephrosis,  nonspecific mild circumferential urinary bladder wall could be under distention.  -Lower extremity edema secondary to chronic kidney disease  -History of bleeding ulcer  in her 50s requiring transfusion, Celestine's ulcers in 9/2014, colonoscopy 9/2014, EGD 1/2019 with hiatal hernia but no ulcers and Harp's esophagus, history of colitis  -GERD , subsequently resolved  -Hypothyroidism  -Gout  -Hypertension.  Amlodipine was added June  2022.  -Hyperlipidemia  -History of hyperkalemia   -Elevated glucose  -Shoulder dislocation  -COVID 11/2020 managed as an outpatient.  Chest x-ray 10/2020 showed large hiatal hernia,  trace effusions versus pleural thickening, COPD.  2/2/2021 CT chest showed no effusion, severe mitral valve calcification, large hiatal hernia, 2 mm nodule right lower lobe, atelectasis.   -Vitamin D deficiency   -Eosinophilia   -Cutaneous candidiasis     Past surgical history  Right hip 6/2017, cataracts, right breast lumpectomy     Social history  No tobacco, alcohol, or drugs.     Family history  No blood disorders or cancers in first-degree relatives  except sister with breast cancer diagnosed at age 78.      No Known Allergies     Current Outpatient Medications on File Prior to Visit   Medication Sig Dispense Refill    allopurinol (Zyloprim) 100 mg tablet Take 2 tablets (200 mg) by mouth once daily. 180 tablet 1    amLODIPine (Norvasc) 2.5 mg tablet Take 1 tablet (2.5 mg) by mouth once daily. 90 tablet 1    aspirin 81 mg EC tablet Take 1 tablet (81 mg) by mouth once daily.      atorvastatin (Lipitor) 20 mg tablet Take 1 tablet (20 mg) by mouth once daily. 90 tablet 1    carvedilol (Coreg) 25 mg tablet Take 1 tablet (25 mg) by mouth 2 times a day with meals. 180 tablet 1    cholecalciferol (Vitamin D-3) 125 MCG (5000 UT) capsule Take 1 capsule (125 mcg) by mouth once daily.      cyanocobalamin, vitamin B-12, 5,000 mcg tablet, sublingual Place 1 tablet under the tongue 1 (one) time each day.      FeroSuL 325 mg (65 mg iron) tablet TAKE ONE TABLET BY MOUTH TWO TIMES A  tablet 0    folic acid (Folvite) 1 mg tablet Take 1 tablet (1 mg) by mouth once daily. 90 tablet 1    levothyroxine (Synthroid, Levoxyl) 50 mcg tablet Take 1 tablet (50 mcg) by mouth once daily. 90 tablet 1    losartan (Cozaar) 100 mg tablet Take 0.5 tablets (50 mg) by mouth once daily. 45 tablet 1    nystatin (Mycostatin) 100,000 unit/gram powder Apply  "topically 3 times a day. 60 g 6    omeprazole (PriLOSEC) 20 mg DR capsule Take 1 capsule (20 mg) by mouth once daily in the morning. Take before meals. Before eating 90 capsule 1    Vitamin B-6 25 mg tablet Take 1 tablet (25 mg) by mouth once daily.       No current facility-administered medications on file prior to visit.            Vitals:    01/09/24 1300   BP: 127/72   Pulse: 75   Resp: 16   Temp: 36.2 °C (97.2 °F)   SpO2: 98%      Physical Exam:      Constitutional: Performance status 0-1.  79.3 kg.  -General: Well-developed and well-nourished, elderly but looks very good for her age. No acute distress.  -Lymphatics: No cervical or supraclavicular or axillary lymphadenopathy.  -Eyes:   Anicteric.  -HEENT: MMM.  Full exam deferred but on 10/2/2023 was as follows: \"Upper dentures.  Lower dentition intact.\"  -Breast: Patient declined examination again 1/9/2024 but on 2/20/2023 was as follows: \"Need for chaperone discussed with patient in the physical exam space-patient consented.  Chaperone and other persons present for physical exam included the following: Medical  assistant Kiara and patient's   Bilateral breasts with no masses.  Cutaneous candidiasis under her left with no significant skin breakdown.\"     -Cardiovascular: Regular rate and rhythm.    -Respiratory: Clear to auscultation bilaterally. Breathing is nonlabored.  -Abdomen: Soft, nontender, limited by body positioning and body habitus.  -Back: No costovertebral angle tenderness. No spine tenderness.  -Extremities: Trace bilateral leg edema, more of a generalized puffiness-overall stable.    -Neurologic: Awake, alert, and oriented. Speech fluent with normal content.     -Skin: Warm and dry distally.    -Psychiatric: Appropriate, cooperative, and very pleasant.      Assessment and Plan:      #Right breast grade 1 invasive carcinoma with ductal and lobular features ER/CO positive and HER-2/garry negative  status post lumpectomy 5/2016, 1.2 cm with " negative lymph nodes and negative margins, pT1CN0, tumor board recommended no adjuvant chemotherapy or radiation, started on Arimidex 6/2016 well-tolerated.  12/22/2020 a bone agent like Prolia or Zometa was  discussed and she declined.  She completed 5 years of Arimidex 6/2021 but wanted to finish out the bottle that she had renewed, completed this 9/2021 .    No evidence of disease.     #Cutaneous candidiasis under her breast, improved with nystatin     #Macrocytic anemia with normal WBC and platelets for the most part, worse November 2020 but might have been due to Covid infection but also  had worsening renal dysfunction in October 2020, anemia in part secondary to renal dysfunction and iron deficiency status post IV iron although  ferritin 7795-2735 greater than 100 so not frankly deficient, cannot rule out something like MDS especially with a macrocytosis but patient refused a bone marrow biopsy , also history of borderline B12 level with elevated MMA 10/2020 which can be elevated due to chronic kidney disease but also due to underlying  B12 deficiency (B12 deficiency would explain the macrocytosis but normal methylmalonic acid 12/2020 with persistent macrocytosis, negative intrinsic factor and parietal cell antibody, started on oral B12 11/2020), history of Celestine's ulcers in 2014 but  no ulcer on 1/2019 and colonoscopy 2014 apparently unremarkable.   She did have a bleeding ulcer requiring transfusion in her 50s.  10/2020 PCP recommended restarting oral iron, on this intermittently since 2017/2018, no side effects, February-March 2021 iron saturation dropped and she received IV iron again March-April 2021 from her nephrologist, also again 9/2021 with subsequent improvement in iron studies and mild improvement in hemoglobin 12/2021.    Procrit was considered by her former oncologist  but patient was feeling well and was not overly enthusiastic about this medication so did not get this.  2016 normal LDH and  SPEP.  2018 normal folate, erythropoietin 25, negative stool occult.   12/2020 B6 was low and she was started on supplement, might have accounted for her elevated homocystine and her macrocytic anemia but not significantly low, normal to elevated B12, iron, folate, copper;  reticulocyte count was 3.2% so she did have some bone marrow reserve, started on folic acid.  Negative celiac 12/2020 .  She was relatively asymptomatic from her anemia even with hemoglobin down to 7s in March-April 2021 but actually got better from the IV iron.  4/2021 negative UPEP, SPEP, HIV, GISSELL; erythropoietin 13.8; no deficiency in zinc/B6/B1/B12/iron at that time;  light chains minimally abnormal, likely not significant especially as light chains were somewhat improved December 2021.     8/2022 hemoglobin stable 11s with stable macrocytosis, ferritin elevated but iron normal consistent with anemia of chronic disease.  September-October 2022 mild leukocytosis subsequently normalized.  February 2023 iron 17% with ferritin 593 status post  IV iron, elevated B12, light chains stable, hemoglobin 11 range with normal WBC and platelets, B6 elevated on supplementation, reticulocyte count 1.6%.  April 2023 hemoglobin stable/improved with no iron deficiency.  October/November 2023 normal WBC, hemoglobin stable 11.1, stable macrocytosis, normal platelets, eosinophils better at 0.7, no iron deficiency, elevated B12, elevated B6 which is okay on supplement.   NEGATIVE HEPATITIS PANEL 2020.      #Iron and B12 and B6 deficiency as above.  4/2021 B6 was elevated so her dose was decreased by half.  B12, B6, iron studies not low on most recent labs October/November 2023.     #Elevated homocystine 10/2020 possibly due to B12/B6 deficiency, better 12/22/2020 although still mildly elevated, normal 12/2021     #Eosinophilia December 2020 and especially 1/2021 with AEC up to 1.8, subsequently down a little bit, only new medications were vitamin B12/B6/folate,   no allergy symptoms, asymptomatic, better March-April 2021.  May 2021 eosinophils 0.88, improved to June 2021 at 0.67, 0.5 in December 2021, back up to 1.53 in 8/2022 with no new changes in medications or symptoms except amlodipine started 6/2022 .    Eosinophils elevated 1.07-1.17 in September-October 2022 but better from prior.  She could have eosinophilia intermittently from cutaneous candidiasis.  Eosinophils less than 1000 on most recent labs October/November 2023.     #Hypertension      #History of vitamin D deficiency with normal level on most recent labs October/November 2023     #Chronic kidney disease with chronic lower extremity edema, followed by nephrology.  Renal ultrasound no acute process 2/18/2021.     #1/2019 hiatal hernia and Harp's esophagus, history of Celestine's ulcers in 2014, history of bleeding ulcer, history of colitis, GERD.  12/2021 she started on a PPI with chronic GI issues improved.     #Hypothyroidism, gout, hyperlipidemia, history of hyperkalemia, elevated glucose, shoulder dislocation     #Covid infection 11/2020 managed as an outpatient.  10/2020 chest x-ray shows a large hiatal hernia, trace effusions versus pleural thickening, COPD.  2/2/2021 CT chest with no effusion but severe mitral valve calcification, large hiatal hernia, few  millimeter nodule right lower lobe.  10/6/2023 CT chest by PCP showed granulomatous changes, GERD, calcifications mitral valve and aortic valve, chronic groundglass opacities right lower lobe, paraesophageal hernia, stable nodules scattered dating back to 2021 felt to be benign.     #Family history of sister with breast cancer.  Genetics consultation was discussed 12/22/2020 and patient declined.     -Nystatin powder was refilled 10/3/2023.  -Patient will continue on oral B12 (gets from PCP) with injections as needed and oral iron with levels monitored  -2/20/2023 I told her she could  go down to 1 iron tablet daily unlikely better absorption with  higher dosage.  Parental supplement can be given as needed.    -Continue B6 25 mg daily , has not needed a prescription, level elevated October 2023 which is okay as this can be excreted in the urine.    -Continue folic acid for elevated reticulocyte count 1 mg daily #30 with 11 refills  which was refilled on 12/21/2021, monitor reticulocyte count periodically (not elevated February 2023), has been getting this so not sure if she has this from her PCP but confirms she did get it.  -CBC with differential and iron studies, reticulocyte count, light chains ordered 1/9/2024-as this is my last day I asked her to call for her results to make sure that the results get transition to the covering provider.  10/2023 B12, B6 done.  Other labs are being done by PCP including vitamin D. Again 10/2/2023 patient declined an aggressive about her evaluation for her eosinophilia or a bone marrow biopsy.   CMP and vitamin D are deferred to PCP  or nephrology, and homocystine can be monitored as needed, deferred by me as it would not change my management necessarily.  Other things to consider would be stool occult, H. pylori, other hemolysis  labs, bone marrow biopsy, abdominal imaging, urine analysis to check for blood loss, peripheral blood flow cytometry leukemia/lymphoma and PNH.    -Feraheme can be given as needed if low iron saturation, 510 mg x 2 given 1 week apart. Side effects of Feraheme were explained and outlined in  my note on 12/22/2020-patient agreed to proceed as needed.  -Transfuse if hemoglobin less than 7-risks and benefits discussed on 12/22/2020 and patient agrees to proceed if needed.  -I recommended ongoing endoscopy given her history of Harp's esophagus, again-she saw Dr. Hood who recommended PPI continue 1/2022, no plans for endoscopy at this time.  -An erythropoietin agent has been considered by her former oncologist, would need to have adequate iron studies, ideally would do a bone marrow biopsy prior to  starting this to rule out MDS but she declined, and order an erythropoietin level prior to  starting which was done 4/2021.  Nephrology appears to be planning on ordering this if her iron studies are repleted depending on her hemoglobin, has not needed this.  8/19/2021 I discussed an erythropoietin agent and patient declined given hemoglobin  of 9s and feeling well.  Procrit could likely start at 10,000 units subcut weekly prn H/H <10/30, verify iron >20% and ferritin >100, side effects outlined in my note on  2/8/2021, when appropriate.   -DEXA scan was done 7/2020 and can be followed up in 2 years, now deferring to PCP as she is off Arimidex-1/9/2024 I reminded patient of this.   Mammogram 10/16/2023 bilateral recommended follow-up in 1 year.  Calcium and vitamin D have been encouraged if no contraindication.  Self exams, family screening, healthy lifestyle  with diet and exercise with optimization of weight, limiting alcohol have been encouraged.  I have told her not to take any estrogen-based products.  A bone agent such as Prolia or Zometa was discussed 12/22/2020 and she declined.  Breast exam can be monitored  -patient declined examination again 1/9/2024, continue to address.  -I recommended follow-up CT chest through PCP who plans on doing this October 2024.   -Labs are incorporated in my note which is to be sent to PCP. I have recommended routine health care maintenance and screening through PCP. The patient was reminded to followup with the PCP for other medical problems and ongoing care. The patient was  asked to return to clinic , or sooner as needed for new or concerning symptoms, in 4 mo.     1/9/2024 I explained my upcoming departure from [facility], explained that I was told that [facility] is working on my replacement, notified patient to make sure appointments/orders are scheduled and kept as directed.  I wished the patient the best of luck.

## 2024-01-10 LAB
KAPPA LC SERPL-MCNC: 10.81 MG/DL (ref 0.33–1.94)
KAPPA LC/LAMBDA SER: 1.45 {RATIO} (ref 0.26–1.65)
LAMBDA LC SERPL-MCNC: 7.44 MG/DL (ref 0.57–2.63)

## 2024-02-06 ENCOUNTER — OFFICE VISIT (OUTPATIENT)
Dept: PRIMARY CARE | Facility: CLINIC | Age: 84
End: 2024-02-06
Payer: MEDICARE

## 2024-02-06 VITALS
DIASTOLIC BLOOD PRESSURE: 76 MMHG | TEMPERATURE: 97.2 F | BODY MASS INDEX: 31.35 KG/M2 | OXYGEN SATURATION: 99 % | WEIGHT: 177 LBS | SYSTOLIC BLOOD PRESSURE: 140 MMHG | HEART RATE: 81 BPM

## 2024-02-06 DIAGNOSIS — E61.1 IRON DEFICIENCY: ICD-10-CM

## 2024-02-06 DIAGNOSIS — I10 HYPERTENSION, UNCONTROLLED: ICD-10-CM

## 2024-02-06 DIAGNOSIS — N18.30 STAGE 3 CHRONIC KIDNEY DISEASE DUE TO BENIGN HYPERTENSION (MULTI): ICD-10-CM

## 2024-02-06 DIAGNOSIS — E78.5 DYSLIPIDEMIA: ICD-10-CM

## 2024-02-06 DIAGNOSIS — M10.9 GOUT, UNSPECIFIED CAUSE, UNSPECIFIED CHRONICITY, UNSPECIFIED SITE: ICD-10-CM

## 2024-02-06 DIAGNOSIS — E21.3 HYPERPARATHYROIDISM (MULTI): ICD-10-CM

## 2024-02-06 DIAGNOSIS — E78.00 HYPERCHOLESTEREMIA: ICD-10-CM

## 2024-02-06 DIAGNOSIS — E03.9 HYPOTHYROIDISM, UNSPECIFIED TYPE: ICD-10-CM

## 2024-02-06 DIAGNOSIS — I12.9 STAGE 3 CHRONIC KIDNEY DISEASE DUE TO BENIGN HYPERTENSION (MULTI): ICD-10-CM

## 2024-02-06 DIAGNOSIS — I10 BENIGN ESSENTIAL HYPERTENSION: Primary | ICD-10-CM

## 2024-02-06 PROCEDURE — 1160F RVW MEDS BY RX/DR IN RCRD: CPT | Performed by: INTERNAL MEDICINE

## 2024-02-06 PROCEDURE — 99214 OFFICE O/P EST MOD 30 MIN: CPT | Performed by: INTERNAL MEDICINE

## 2024-02-06 PROCEDURE — 1159F MED LIST DOCD IN RCRD: CPT | Performed by: INTERNAL MEDICINE

## 2024-02-06 PROCEDURE — 3077F SYST BP >= 140 MM HG: CPT | Performed by: INTERNAL MEDICINE

## 2024-02-06 PROCEDURE — 3078F DIAST BP <80 MM HG: CPT | Performed by: INTERNAL MEDICINE

## 2024-02-06 PROCEDURE — 1036F TOBACCO NON-USER: CPT | Performed by: INTERNAL MEDICINE

## 2024-02-06 PROCEDURE — 1126F AMNT PAIN NOTED NONE PRSNT: CPT | Performed by: INTERNAL MEDICINE

## 2024-02-06 PROCEDURE — 1157F ADVNC CARE PLAN IN RCRD: CPT | Performed by: INTERNAL MEDICINE

## 2024-02-06 RX ORDER — LEVOTHYROXINE SODIUM 50 UG/1
50 TABLET ORAL DAILY
Qty: 90 TABLET | Refills: 1 | Status: SHIPPED | OUTPATIENT
Start: 2024-02-06 | End: 2024-03-21 | Stop reason: SDUPTHER

## 2024-02-06 RX ORDER — AMLODIPINE BESYLATE 2.5 MG/1
2.5 TABLET ORAL DAILY
Qty: 90 TABLET | Refills: 1 | Status: SHIPPED | OUTPATIENT
Start: 2024-02-06 | End: 2024-03-21 | Stop reason: SDUPTHER

## 2024-02-06 RX ORDER — LOSARTAN POTASSIUM 100 MG/1
50 TABLET ORAL DAILY
Qty: 45 TABLET | Refills: 1 | Status: SHIPPED | OUTPATIENT
Start: 2024-02-06 | End: 2024-03-21 | Stop reason: SDUPTHER

## 2024-02-06 RX ORDER — CARVEDILOL 25 MG/1
25 TABLET ORAL
Qty: 180 TABLET | Refills: 1 | Status: SHIPPED | OUTPATIENT
Start: 2024-02-06 | End: 2024-03-14 | Stop reason: HOSPADM

## 2024-02-06 RX ORDER — FERROUS SULFATE 325(65) MG
1 TABLET ORAL 2 TIMES DAILY
Qty: 180 TABLET | Refills: 1 | Status: SHIPPED | OUTPATIENT
Start: 2024-02-06 | End: 2024-02-06 | Stop reason: SDUPTHER

## 2024-02-06 RX ORDER — ATORVASTATIN CALCIUM 20 MG/1
20 TABLET, FILM COATED ORAL DAILY
Qty: 90 TABLET | Refills: 1 | Status: SHIPPED | OUTPATIENT
Start: 2024-02-06 | End: 2024-03-21 | Stop reason: SDUPTHER

## 2024-02-06 RX ORDER — FERROUS SULFATE 325(65) MG
1 TABLET ORAL 2 TIMES DAILY
Qty: 180 TABLET | Refills: 1 | Status: SHIPPED | OUTPATIENT
Start: 2024-02-06

## 2024-02-06 ASSESSMENT — ENCOUNTER SYMPTOMS
DIARRHEA: 0
SORE THROAT: 0
PALPITATIONS: 0
HEADACHES: 0
FATIGUE: 0
UNEXPECTED WEIGHT CHANGE: 0
SINUS PAIN: 0
ARTHRALGIAS: 0
COUGH: 0
HYPERTENSION: 1
ABDOMINAL PAIN: 0
BLOOD IN STOOL: 0
FEVER: 0
WHEEZING: 0
DIFFICULTY URINATING: 0
BRUISES/BLEEDS EASILY: 0
DIZZINESS: 0

## 2024-02-06 NOTE — PROGRESS NOTES
Subjective   Patient ID: Lilly Parr is a 83 y.o. female who presents for Hypertension and Hyperlipidemia.    -CT results are abnormal discussed with patient finding lung disease with multiple lung nodules no change from previous scans need to follow-up in 1 year repeat CT chest in October 2024 no coughing no shortness of breath no weight loss  -Hypertension improved continue with current medication continue low-salt diet  -Chronic anemia continue with current vitamin B12 and vitamin B6 iron supplement anemia of chronic disease  - Patient declined bone marrow biopsy aware of risk and benefit continue monitor conservatively  --Chronic kidney disease, controlled continue with low-salt diet increase fluid intake no change continue monitoring conservatively patient scheduled to see nephrology again in September 2024  -Mammogram reviewed normal results follow-up in 1 year  - Need to proceed with bone density as recommended will arrange in 6 months  - Chronic gout controlled continue current medication  -  Hypercholesteremia continue with current medication continue low-carb diet.  -Breast cancer in remission continue on anastrozole no medication side effects  -Hypothyroid continue the levothyroxine , compensated.  Follow-up 3 months      Hypertension  Pertinent negatives include no chest pain, headaches or palpitations.   Hyperlipidemia  Pertinent negatives include no chest pain.          Review of Systems   Constitutional:  Negative for fatigue, fever and unexpected weight change.   HENT:  Negative for congestion, ear discharge, ear pain, mouth sores, sinus pain and sore throat.    Eyes:  Negative for visual disturbance.   Respiratory:  Negative for cough and wheezing.    Cardiovascular:  Negative for chest pain, palpitations and leg swelling.   Gastrointestinal:  Negative for abdominal pain, blood in stool and diarrhea.   Genitourinary:  Negative for difficulty urinating.   Musculoskeletal:  Negative for  "arthralgias.   Skin:  Negative for rash.   Neurological:  Negative for dizziness and headaches.   Hematological:  Does not bruise/bleed easily.   Psychiatric/Behavioral:  Negative for behavioral problems.    All other systems reviewed and are negative.      Objective   No results found for: \"HGBA1C\"   /76   Pulse 81   Temp 36.2 °C (97.2 °F)   Wt 80.3 kg (177 lb)   SpO2 99%   BMI 31.35 kg/m²   Lab Results   Component Value Date    WBC 8.3 01/09/2024    HGB 10.7 (L) 01/09/2024    HCT 34.2 (L) 01/09/2024     01/09/2024    CHOL 130 10/04/2023    TRIG 98 10/04/2023    HDL 46.4 10/04/2023    ALT 20 10/04/2023    AST 27 10/04/2023     11/03/2023    K 4.1 11/03/2023     11/03/2023    CREATININE 1.07 (H) 11/03/2023    BUN 21 11/03/2023    CO2 26 11/03/2023    TSH 2.02 10/04/2023     par   Physical Exam  Vitals and nursing note reviewed.   Constitutional:       Appearance: Normal appearance.   HENT:      Head: Normocephalic.      Nose: Nose normal.   Eyes:      Conjunctiva/sclera: Conjunctivae normal.      Pupils: Pupils are equal, round, and reactive to light.   Cardiovascular:      Rate and Rhythm: Regular rhythm.   Pulmonary:      Effort: Pulmonary effort is normal.      Breath sounds: Normal breath sounds.   Abdominal:      General: Abdomen is flat.      Palpations: Abdomen is soft.   Musculoskeletal:      Cervical back: Neck supple.   Skin:     General: Skin is warm.   Neurological:      General: No focal deficit present.      Mental Status: She is oriented to person, place, and time.   Psychiatric:         Mood and Affect: Mood normal.         Assessment/Plan   Lilly was seen today for hypertension and hyperlipidemia.  Diagnoses and all orders for this visit:  Benign essential hypertension (Primary)  -     losartan (Cozaar) 100 mg tablet; Take 0.5 tablets (50 mg) by mouth once daily.  -     amLODIPine (Norvasc) 2.5 mg tablet; Take 1 tablet (2.5 mg) by mouth once daily.  Iron deficiency  -   "   Discontinue: ferrous sulfate, 325 mg ferrous sulfate, (FeroSuL) tablet; Take 1 tablet by mouth 2 times a day.  -     ferrous sulfate, 325 mg ferrous sulfate, (FeroSuL) tablet; Take 1 tablet by mouth 2 times a day.  Hyperparathyroidism (CMS/HCC)  Stage 3 chronic kidney disease due to benign hypertension (CMS/HCC)  Gout, unspecified cause, unspecified chronicity, unspecified site  Dyslipidemia  -     atorvastatin (Lipitor) 20 mg tablet; Take 1 tablet (20 mg) by mouth once daily.  Hypercholesteremia  Hypertension, uncontrolled  -     carvedilol (Coreg) 25 mg tablet; Take 1 tablet (25 mg) by mouth 2 times a day with meals.  Hypothyroidism, unspecified type  -     levothyroxine (Synthroid, Levoxyl) 50 mcg tablet; Take 1 tablet (50 mcg) by mouth once daily.  Other orders  -     Follow Up In Primary Care - Established; Future   CT results are abnormal discussed with patient finding lung disease with multiple lung nodules no change from previous scans need to follow-up in 1 year repeat CT chest in October 2024 no coughing no shortness of breath no weight loss  -Hypertension improved continue with current medication continue low-salt diet  -Chronic anemia continue with current vitamin B12 and vitamin B6 iron supplement anemia of chronic disease  - Patient declined bone marrow biopsy aware of risk and benefit continue monitor conservatively  --Chronic kidney disease, controlled continue with low-salt diet increase fluid intake no change continue monitoring conservatively patient scheduled to see nephrology again in September 2024  -Mammogram reviewed normal results follow-up in 1 year  - Need to proceed with bone density as recommended will arrange in 6 months  - Chronic gout controlled continue current medication  -  Hypercholesteremia continue with current medication continue low-carb diet.  -Breast cancer in remission continue on anastrozole no medication side effects  -Hypothyroid continue the levothyroxine ,  compensated.  Follow-up 3 months

## 2024-02-27 ENCOUNTER — OFFICE VISIT (OUTPATIENT)
Dept: PRIMARY CARE | Facility: CLINIC | Age: 84
End: 2024-02-27
Payer: MEDICARE

## 2024-02-27 ENCOUNTER — LAB (OUTPATIENT)
Dept: LAB | Facility: LAB | Age: 84
End: 2024-02-27
Payer: MEDICARE

## 2024-02-27 VITALS
OXYGEN SATURATION: 98 % | TEMPERATURE: 97.3 F | HEART RATE: 68 BPM | SYSTOLIC BLOOD PRESSURE: 182 MMHG | DIASTOLIC BLOOD PRESSURE: 82 MMHG

## 2024-02-27 DIAGNOSIS — E61.1 IRON DEFICIENCY: ICD-10-CM

## 2024-02-27 DIAGNOSIS — E78.00 HYPERCHOLESTEREMIA: ICD-10-CM

## 2024-02-27 DIAGNOSIS — Z79.899 HIGH RISK MEDICATION USE: ICD-10-CM

## 2024-02-27 DIAGNOSIS — I10 HYPERTENSION, UNCONTROLLED: ICD-10-CM

## 2024-02-27 DIAGNOSIS — R55 SYNCOPE, UNSPECIFIED SYNCOPE TYPE: ICD-10-CM

## 2024-02-27 DIAGNOSIS — N18.30 STAGE 3 CHRONIC KIDNEY DISEASE DUE TO BENIGN HYPERTENSION (MULTI): ICD-10-CM

## 2024-02-27 DIAGNOSIS — I12.9 STAGE 3 CHRONIC KIDNEY DISEASE DUE TO BENIGN HYPERTENSION (MULTI): ICD-10-CM

## 2024-02-27 DIAGNOSIS — R55 SYNCOPE, UNSPECIFIED SYNCOPE TYPE: Primary | ICD-10-CM

## 2024-02-27 DIAGNOSIS — I10 BENIGN ESSENTIAL HYPERTENSION: ICD-10-CM

## 2024-02-27 LAB
ANION GAP SERPL CALC-SCNC: 13 MMOL/L (ref 10–20)
APPEARANCE UR: CLEAR
BACTERIA #/AREA URNS AUTO: ABNORMAL /HPF
BASOPHILS # BLD AUTO: 0.06 X10*3/UL (ref 0–0.1)
BASOPHILS NFR BLD AUTO: 0.8 %
BILIRUB UR STRIP.AUTO-MCNC: NEGATIVE MG/DL
BUN SERPL-MCNC: 25 MG/DL (ref 6–23)
CALCIUM SERPL-MCNC: 9.9 MG/DL (ref 8.6–10.3)
CHLORIDE SERPL-SCNC: 104 MMOL/L (ref 98–107)
CO2 SERPL-SCNC: 27 MMOL/L (ref 21–32)
COLOR UR: YELLOW
CREAT SERPL-MCNC: 1.18 MG/DL (ref 0.5–1.05)
EGFRCR SERPLBLD CKD-EPI 2021: 45 ML/MIN/1.73M*2
EOSINOPHIL # BLD AUTO: 0.34 X10*3/UL (ref 0–0.4)
EOSINOPHIL NFR BLD AUTO: 4.5 %
ERYTHROCYTE [DISTWIDTH] IN BLOOD BY AUTOMATED COUNT: 14.6 % (ref 11.5–14.5)
GLUCOSE SERPL-MCNC: 118 MG/DL (ref 74–99)
GLUCOSE UR STRIP.AUTO-MCNC: NEGATIVE MG/DL
HCT VFR BLD AUTO: 37.1 % (ref 36–46)
HGB BLD-MCNC: 11.7 G/DL (ref 12–16)
HOLD SPECIMEN: NORMAL
IMM GRANULOCYTES # BLD AUTO: 0.03 X10*3/UL (ref 0–0.5)
IMM GRANULOCYTES NFR BLD AUTO: 0.4 % (ref 0–0.9)
KETONES UR STRIP.AUTO-MCNC: NEGATIVE MG/DL
LEUKOCYTE ESTERASE UR QL STRIP.AUTO: NEGATIVE
LYMPHOCYTES # BLD AUTO: 1.31 X10*3/UL (ref 0.8–3)
LYMPHOCYTES NFR BLD AUTO: 17.4 %
MCH RBC QN AUTO: 31.3 PG (ref 26–34)
MCHC RBC AUTO-ENTMCNC: 31.5 G/DL (ref 32–36)
MCV RBC AUTO: 99 FL (ref 80–100)
MONOCYTES # BLD AUTO: 0.51 X10*3/UL (ref 0.05–0.8)
MONOCYTES NFR BLD AUTO: 6.8 %
MUCOUS THREADS #/AREA URNS AUTO: ABNORMAL /LPF
NEUTROPHILS # BLD AUTO: 5.3 X10*3/UL (ref 1.6–5.5)
NEUTROPHILS NFR BLD AUTO: 70.1 %
NITRITE UR QL STRIP.AUTO: NEGATIVE
NRBC BLD-RTO: 0 /100 WBCS (ref 0–0)
PH UR STRIP.AUTO: 6 [PH]
PLATELET # BLD AUTO: 239 X10*3/UL (ref 150–450)
POTASSIUM SERPL-SCNC: 4.5 MMOL/L (ref 3.5–5.3)
PROT UR STRIP.AUTO-MCNC: NEGATIVE MG/DL
RBC # BLD AUTO: 3.74 X10*6/UL (ref 4–5.2)
RBC # UR STRIP.AUTO: ABNORMAL /UL
RBC #/AREA URNS AUTO: ABNORMAL /HPF
SODIUM SERPL-SCNC: 139 MMOL/L (ref 136–145)
SP GR UR STRIP.AUTO: 1.01
SQUAMOUS #/AREA URNS AUTO: ABNORMAL /HPF
UROBILINOGEN UR STRIP.AUTO-MCNC: <2 MG/DL
WBC # BLD AUTO: 7.6 X10*3/UL (ref 4.4–11.3)
WBC #/AREA URNS AUTO: ABNORMAL /HPF

## 2024-02-27 PROCEDURE — 3079F DIAST BP 80-89 MM HG: CPT | Performed by: INTERNAL MEDICINE

## 2024-02-27 PROCEDURE — 36415 COLL VENOUS BLD VENIPUNCTURE: CPT

## 2024-02-27 PROCEDURE — 1126F AMNT PAIN NOTED NONE PRSNT: CPT | Performed by: INTERNAL MEDICINE

## 2024-02-27 PROCEDURE — 93000 ELECTROCARDIOGRAM COMPLETE: CPT | Performed by: INTERNAL MEDICINE

## 2024-02-27 PROCEDURE — 99214 OFFICE O/P EST MOD 30 MIN: CPT | Performed by: INTERNAL MEDICINE

## 2024-02-27 PROCEDURE — 1159F MED LIST DOCD IN RCRD: CPT | Performed by: INTERNAL MEDICINE

## 2024-02-27 PROCEDURE — 1160F RVW MEDS BY RX/DR IN RCRD: CPT | Performed by: INTERNAL MEDICINE

## 2024-02-27 PROCEDURE — 3077F SYST BP >= 140 MM HG: CPT | Performed by: INTERNAL MEDICINE

## 2024-02-27 PROCEDURE — 1036F TOBACCO NON-USER: CPT | Performed by: INTERNAL MEDICINE

## 2024-02-27 PROCEDURE — 1157F ADVNC CARE PLAN IN RCRD: CPT | Performed by: INTERNAL MEDICINE

## 2024-02-27 ASSESSMENT — ENCOUNTER SYMPTOMS
FATIGUE: 0
COUGH: 0
SORE THROAT: 0
BRUISES/BLEEDS EASILY: 0
UNEXPECTED WEIGHT CHANGE: 0
HEADACHES: 0
DIZZINESS: 1
SINUS PAIN: 0
WHEEZING: 0
DIFFICULTY URINATING: 0
PALPITATIONS: 0
ABDOMINAL PAIN: 0
BLOOD IN STOOL: 0
FEVER: 0
DIARRHEA: 0
ARTHRALGIAS: 0

## 2024-02-27 NOTE — PROGRESS NOTES
Subjective   Patient ID: Lilly Parr is a 83 y.o. female who presents for Fall (Passing out x 1 time this week, hit rt side of ribs and back of head, has felt flushed ).    - Patient comes today felt dizzy at home fell on the floor right-sided chest wall pain recurrent dizziness when she stands  -Dizziness etiology unclear  Obtain EKG today sinus rhythm need to obtain carotid ultrasound  MRI of the brain with and without contrast  - Refer patient to cardiology for further workup will monitor patient closely  - Obtain CBC BMP urine test today follow-up results closely for further recommendation  - CT results are abnormal discussed with patient finding lung disease with multiple lung nodules no change from previous scans need to follow-up in 1 year repeat CT chest in October 2024 no coughing no shortness of breath no weight loss  -Hypertension improved continue with current medication continue low-salt diet  -Chronic anemia continue with current vitamin B12 and vitamin B6 iron supplement anemia of chronic disease  - Patient declined bone marrow biopsy aware of risk and benefit continue monitor conservatively  --Chronic kidney disease, controlled continue with low-salt diet increase fluid intake no change continue monitoring conservatively patient scheduled to see nephrology again in September 2024  -Mammogram reviewed normal results follow-up in 1 year  - Need to proceed with bone density as recommended will arrange in 6 months  - Chronic gout controlled continue current medication  -  Hypercholesteremia continue with current medication continue low-carb diet.  -Breast cancer in remission continue on anastrozole no medication side effects  -Hypothyroid continue the levothyroxine , compensated.  Follow-up 4 weeks      Fall  Pertinent negatives include no abdominal pain, fever or headaches.          Review of Systems   Constitutional:  Negative for fatigue, fever and unexpected weight change.   HENT:  Negative for  "congestion, ear discharge, ear pain, mouth sores, sinus pain and sore throat.    Eyes:  Negative for visual disturbance.   Respiratory:  Negative for cough and wheezing.    Cardiovascular:  Negative for chest pain, palpitations and leg swelling.   Gastrointestinal:  Negative for abdominal pain, blood in stool and diarrhea.   Genitourinary:  Negative for difficulty urinating.   Musculoskeletal:  Negative for arthralgias.   Skin:  Negative for rash.   Neurological:  Positive for dizziness. Negative for headaches.   Hematological:  Does not bruise/bleed easily.   Psychiatric/Behavioral:  Negative for behavioral problems.    All other systems reviewed and are negative.      Objective   No results found for: \"HGBA1C\"   BP (!) 182/82   Pulse 68   Temp 36.3 °C (97.3 °F)   SpO2 98%     Physical Exam  Vitals and nursing note reviewed.   Constitutional:       Appearance: Normal appearance.   HENT:      Head: Normocephalic.      Nose: Nose normal.   Eyes:      Conjunctiva/sclera: Conjunctivae normal.      Pupils: Pupils are equal, round, and reactive to light.   Cardiovascular:      Rate and Rhythm: Regular rhythm.   Pulmonary:      Effort: Pulmonary effort is normal.      Breath sounds: Normal breath sounds.   Abdominal:      General: Abdomen is flat.      Palpations: Abdomen is soft.   Musculoskeletal:         General: Tenderness (Right-sided chest wall tenderness) present.      Cervical back: Neck supple.   Skin:     General: Skin is warm.   Neurological:      General: No focal deficit present.      Mental Status: She is oriented to person, place, and time.   Psychiatric:         Mood and Affect: Mood normal.         Assessment/Plan   Lilly was seen today for fall.  Diagnoses and all orders for this visit:  Syncope, unspecified syncope type (Primary)  -     Vascular US Carotid Artery Duplex Bilateral; Future  -     Basic metabolic panel; Future  -     MR brain w and wo IV contrast; Future  -     Referral to Cardiology; " Future  -     Urinalysis with Reflex Culture and Microscopic; Future  Hypertension, uncontrolled  -     ECG 12 Lead  High risk medication use  -     CBC and Auto Differential; Future  Hypercholesteremia  Stage 3 chronic kidney disease due to benign hypertension (CMS/HCC)  Iron deficiency  Benign essential hypertension   - Patient comes today felt dizzy at home fell on the floor right-sided chest wall pain recurrent dizziness when she stands  -Dizziness etiology unclear  Obtain EKG today sinus rhythm need to obtain carotid ultrasound  MRI of the brain with and without contrast  - Refer patient to cardiology for further workup will monitor patient closely  - Obtain CBC BMP urine test today follow-up results closely for further recommendation  - CT results are abnormal discussed with patient finding lung disease with multiple lung nodules no change from previous scans need to follow-up in 1 year repeat CT chest in October 2024 no coughing no shortness of breath no weight loss  -Hypertension improved continue with current medication continue low-salt diet  -Chronic anemia continue with current vitamin B12 and vitamin B6 iron supplement anemia of chronic disease  - Patient declined bone marrow biopsy aware of risk and benefit continue monitor conservatively  --Chronic kidney disease, controlled continue with low-salt diet increase fluid intake no change continue monitoring conservatively patient scheduled to see nephrology again in September 2024  -Mammogram reviewed normal results follow-up in 1 year  - Need to proceed with bone density as recommended will arrange in 6 months  - Chronic gout controlled continue current medication  -  Hypercholesteremia continue with current medication continue low-carb diet.  -Breast cancer in remission continue on anastrozole no medication side effects  -Hypothyroid continue the levothyroxine , compensated.  Follow-up 4 weeks

## 2024-03-05 ENCOUNTER — HOSPITAL ENCOUNTER (OUTPATIENT)
Dept: RADIOLOGY | Facility: HOSPITAL | Age: 84
Discharge: HOME | End: 2024-03-05
Payer: MEDICARE

## 2024-03-05 DIAGNOSIS — R55 SYNCOPE, UNSPECIFIED SYNCOPE TYPE: ICD-10-CM

## 2024-03-05 PROCEDURE — 70551 MRI BRAIN STEM W/O DYE: CPT | Performed by: RADIOLOGY

## 2024-03-05 PROCEDURE — 70551 MRI BRAIN STEM W/O DYE: CPT

## 2024-03-11 ENCOUNTER — APPOINTMENT (OUTPATIENT)
Dept: CARDIOLOGY | Facility: HOSPITAL | Age: 84
DRG: 243 | End: 2024-03-11
Payer: MEDICARE

## 2024-03-11 ENCOUNTER — HOSPITAL ENCOUNTER (INPATIENT)
Facility: HOSPITAL | Age: 84
LOS: 3 days | Discharge: HOME | DRG: 243 | End: 2024-03-14
Attending: STUDENT IN AN ORGANIZED HEALTH CARE EDUCATION/TRAINING PROGRAM | Admitting: INTERNAL MEDICINE
Payer: MEDICARE

## 2024-03-11 ENCOUNTER — APPOINTMENT (OUTPATIENT)
Dept: RADIOLOGY | Facility: HOSPITAL | Age: 84
DRG: 243 | End: 2024-03-11
Payer: MEDICARE

## 2024-03-11 DIAGNOSIS — Z79.2 PROPHYLACTIC ANTIBIOTIC: ICD-10-CM

## 2024-03-11 DIAGNOSIS — I44.2 COMPLETE HEART BLOCK (MULTI): Primary | ICD-10-CM

## 2024-03-11 DIAGNOSIS — M10.9 GOUT, UNSPECIFIED CAUSE, UNSPECIFIED CHRONICITY, UNSPECIFIED SITE: ICD-10-CM

## 2024-03-11 DIAGNOSIS — N28.9 ACUTE KIDNEY INSUFFICIENCY: ICD-10-CM

## 2024-03-11 LAB
ABO GROUP (TYPE) IN BLOOD: NORMAL
ALBUMIN SERPL BCP-MCNC: 3.8 G/DL (ref 3.4–5)
ALP SERPL-CCNC: 79 U/L (ref 33–136)
ALT SERPL W P-5'-P-CCNC: 65 U/L (ref 7–45)
ANION GAP SERPL CALC-SCNC: 15 MMOL/L (ref 10–20)
ANTIBODY SCREEN: NORMAL
AST SERPL W P-5'-P-CCNC: 51 U/L (ref 9–39)
BASOPHILS # BLD AUTO: 0.06 X10*3/UL (ref 0–0.1)
BASOPHILS NFR BLD AUTO: 0.7 %
BILIRUB SERPL-MCNC: 0.4 MG/DL (ref 0–1.2)
BNP SERPL-MCNC: 2257 PG/ML (ref 0–99)
BUN SERPL-MCNC: 49 MG/DL (ref 6–23)
CALCIUM SERPL-MCNC: 9.2 MG/DL (ref 8.6–10.3)
CARDIAC TROPONIN I PNL SERPL HS: 33 NG/L (ref 0–13)
CARDIAC TROPONIN I PNL SERPL HS: 34 NG/L (ref 0–13)
CHLORIDE SERPL-SCNC: 105 MMOL/L (ref 98–107)
CO2 SERPL-SCNC: 22 MMOL/L (ref 21–32)
CREAT SERPL-MCNC: 1.53 MG/DL (ref 0.5–1.05)
EGFRCR SERPLBLD CKD-EPI 2021: 33 ML/MIN/1.73M*2
EOSINOPHIL # BLD AUTO: 0.49 X10*3/UL (ref 0–0.4)
EOSINOPHIL NFR BLD AUTO: 5.7 %
ERYTHROCYTE [DISTWIDTH] IN BLOOD BY AUTOMATED COUNT: 15.1 % (ref 11.5–14.5)
GLUCOSE SERPL-MCNC: 121 MG/DL (ref 74–99)
HCT VFR BLD AUTO: 33.4 % (ref 36–46)
HGB BLD-MCNC: 10.6 G/DL (ref 12–16)
IMM GRANULOCYTES # BLD AUTO: 0.04 X10*3/UL (ref 0–0.5)
IMM GRANULOCYTES NFR BLD AUTO: 0.5 % (ref 0–0.9)
INR PPP: 1 (ref 0.9–1.1)
LYMPHOCYTES # BLD AUTO: 1.52 X10*3/UL (ref 0.8–3)
LYMPHOCYTES NFR BLD AUTO: 17.8 %
MAGNESIUM SERPL-MCNC: 1.69 MG/DL (ref 1.6–2.4)
MCH RBC QN AUTO: 31.8 PG (ref 26–34)
MCHC RBC AUTO-ENTMCNC: 31.7 G/DL (ref 32–36)
MCV RBC AUTO: 100 FL (ref 80–100)
MONOCYTES # BLD AUTO: 0.53 X10*3/UL (ref 0.05–0.8)
MONOCYTES NFR BLD AUTO: 6.2 %
NEUTROPHILS # BLD AUTO: 5.9 X10*3/UL (ref 1.6–5.5)
NEUTROPHILS NFR BLD AUTO: 69.1 %
NRBC BLD-RTO: 0 /100 WBCS (ref 0–0)
PHOSPHATE SERPL-MCNC: 3.2 MG/DL (ref 2.5–4.9)
PLATELET # BLD AUTO: 206 X10*3/UL (ref 150–450)
POTASSIUM SERPL-SCNC: 4.5 MMOL/L (ref 3.5–5.3)
PROT SERPL-MCNC: 6.8 G/DL (ref 6.4–8.2)
PROTHROMBIN TIME: 11.8 SECONDS (ref 9.8–12.8)
RBC # BLD AUTO: 3.33 X10*6/UL (ref 4–5.2)
RH FACTOR (ANTIGEN D): NORMAL
SODIUM SERPL-SCNC: 137 MMOL/L (ref 136–145)
TSH SERPL-ACNC: 1.88 MIU/L (ref 0.44–3.98)
WBC # BLD AUTO: 8.5 X10*3/UL (ref 4.4–11.3)

## 2024-03-11 PROCEDURE — 99291 CRITICAL CARE FIRST HOUR: CPT | Performed by: INTERNAL MEDICINE

## 2024-03-11 PROCEDURE — 84100 ASSAY OF PHOSPHORUS: CPT | Performed by: STUDENT IN AN ORGANIZED HEALTH CARE EDUCATION/TRAINING PROGRAM

## 2024-03-11 PROCEDURE — 93005 ELECTROCARDIOGRAM TRACING: CPT

## 2024-03-11 PROCEDURE — 83880 ASSAY OF NATRIURETIC PEPTIDE: CPT | Performed by: STUDENT IN AN ORGANIZED HEALTH CARE EDUCATION/TRAINING PROGRAM

## 2024-03-11 PROCEDURE — 99285 EMERGENCY DEPT VISIT HI MDM: CPT | Mod: 25

## 2024-03-11 PROCEDURE — 84075 ASSAY ALKALINE PHOSPHATASE: CPT | Performed by: STUDENT IN AN ORGANIZED HEALTH CARE EDUCATION/TRAINING PROGRAM

## 2024-03-11 PROCEDURE — 2500000001 HC RX 250 WO HCPCS SELF ADMINISTERED DRUGS (ALT 637 FOR MEDICARE OP)

## 2024-03-11 PROCEDURE — 2020000001 HC ICU ROOM DAILY

## 2024-03-11 PROCEDURE — 83735 ASSAY OF MAGNESIUM: CPT | Performed by: STUDENT IN AN ORGANIZED HEALTH CARE EDUCATION/TRAINING PROGRAM

## 2024-03-11 PROCEDURE — 85610 PROTHROMBIN TIME: CPT | Performed by: STUDENT IN AN ORGANIZED HEALTH CARE EDUCATION/TRAINING PROGRAM

## 2024-03-11 PROCEDURE — 85025 COMPLETE CBC W/AUTO DIFF WBC: CPT | Performed by: STUDENT IN AN ORGANIZED HEALTH CARE EDUCATION/TRAINING PROGRAM

## 2024-03-11 PROCEDURE — 2500000004 HC RX 250 GENERAL PHARMACY W/ HCPCS (ALT 636 FOR OP/ED)

## 2024-03-11 PROCEDURE — 71045 X-RAY EXAM CHEST 1 VIEW: CPT

## 2024-03-11 PROCEDURE — 84443 ASSAY THYROID STIM HORMONE: CPT | Performed by: STUDENT IN AN ORGANIZED HEALTH CARE EDUCATION/TRAINING PROGRAM

## 2024-03-11 PROCEDURE — 84484 ASSAY OF TROPONIN QUANT: CPT | Performed by: STUDENT IN AN ORGANIZED HEALTH CARE EDUCATION/TRAINING PROGRAM

## 2024-03-11 PROCEDURE — 86850 RBC ANTIBODY SCREEN: CPT | Performed by: STUDENT IN AN ORGANIZED HEALTH CARE EDUCATION/TRAINING PROGRAM

## 2024-03-11 PROCEDURE — 36415 COLL VENOUS BLD VENIPUNCTURE: CPT | Performed by: STUDENT IN AN ORGANIZED HEALTH CARE EDUCATION/TRAINING PROGRAM

## 2024-03-11 PROCEDURE — 71045 X-RAY EXAM CHEST 1 VIEW: CPT | Performed by: RADIOLOGY

## 2024-03-11 RX ORDER — ALLOPURINOL 100 MG/1
200 TABLET ORAL DAILY
Status: DISCONTINUED | OUTPATIENT
Start: 2024-03-11 | End: 2024-03-14 | Stop reason: HOSPADM

## 2024-03-11 RX ORDER — FOLIC ACID 1 MG/1
1 TABLET ORAL
Status: DISCONTINUED | OUTPATIENT
Start: 2024-03-12 | End: 2024-03-14 | Stop reason: HOSPADM

## 2024-03-11 RX ORDER — LOSARTAN POTASSIUM 50 MG/1
50 TABLET ORAL ONCE
Status: COMPLETED | OUTPATIENT
Start: 2024-03-11 | End: 2024-03-11

## 2024-03-11 RX ORDER — LOSARTAN POTASSIUM 50 MG/1
TABLET ORAL
Status: COMPLETED
Start: 2024-03-11 | End: 2024-03-11

## 2024-03-11 RX ORDER — ASPIRIN 81 MG/1
81 TABLET ORAL EVERY EVENING
Status: DISCONTINUED | OUTPATIENT
Start: 2024-03-11 | End: 2024-03-14 | Stop reason: HOSPADM

## 2024-03-11 RX ORDER — LANOLIN ALCOHOL/MO/W.PET/CERES
25 CREAM (GRAM) TOPICAL
Status: DISCONTINUED | OUTPATIENT
Start: 2024-03-12 | End: 2024-03-14 | Stop reason: HOSPADM

## 2024-03-11 RX ORDER — HEPARIN SODIUM 5000 [USP'U]/ML
5000 INJECTION, SOLUTION INTRAVENOUS; SUBCUTANEOUS EVERY 8 HOURS
Status: DISPENSED | OUTPATIENT
Start: 2024-03-11 | End: 2024-03-13

## 2024-03-11 RX ORDER — FERROUS SULFATE 325(65) MG
1 TABLET ORAL DAILY
Status: DISCONTINUED | OUTPATIENT
Start: 2024-03-11 | End: 2024-03-14 | Stop reason: HOSPADM

## 2024-03-11 RX ORDER — HEPARIN SODIUM 5000 [USP'U]/ML
5000 INJECTION, SOLUTION INTRAVENOUS; SUBCUTANEOUS EVERY 8 HOURS
Status: DISCONTINUED | OUTPATIENT
Start: 2024-03-11 | End: 2024-03-11

## 2024-03-11 RX ORDER — LOSARTAN POTASSIUM 50 MG/1
50 TABLET ORAL EVERY MORNING
Status: DISCONTINUED | OUTPATIENT
Start: 2024-03-12 | End: 2024-03-14 | Stop reason: HOSPADM

## 2024-03-11 RX ORDER — AMLODIPINE BESYLATE 5 MG/1
2.5 TABLET ORAL EVERY MORNING
Status: DISCONTINUED | OUTPATIENT
Start: 2024-03-12 | End: 2024-03-14 | Stop reason: HOSPADM

## 2024-03-11 RX ORDER — PANTOPRAZOLE SODIUM 40 MG/1
40 TABLET, DELAYED RELEASE ORAL
Status: DISCONTINUED | OUTPATIENT
Start: 2024-03-12 | End: 2024-03-14 | Stop reason: HOSPADM

## 2024-03-11 RX ORDER — ALLOPURINOL 100 MG/1
100 TABLET ORAL 2 TIMES DAILY
Status: DISCONTINUED | OUTPATIENT
Start: 2024-03-11 | End: 2024-03-11

## 2024-03-11 RX ORDER — ATORVASTATIN CALCIUM 20 MG/1
20 TABLET, FILM COATED ORAL NIGHTLY
Status: DISCONTINUED | OUTPATIENT
Start: 2024-03-11 | End: 2024-03-14 | Stop reason: HOSPADM

## 2024-03-11 RX ORDER — LEVOTHYROXINE SODIUM 50 UG/1
50 TABLET ORAL EVERY MORNING
Status: DISCONTINUED | OUTPATIENT
Start: 2024-03-12 | End: 2024-03-14 | Stop reason: HOSPADM

## 2024-03-11 RX ORDER — CARVEDILOL 12.5 MG/1
25 TABLET ORAL
Status: DISCONTINUED | OUTPATIENT
Start: 2024-03-11 | End: 2024-03-11

## 2024-03-11 RX ORDER — CHOLECALCIFEROL (VITAMIN D3) 125 MCG
5000 CAPSULE ORAL EVERY EVENING
Status: DISCONTINUED | OUTPATIENT
Start: 2024-03-11 | End: 2024-03-14 | Stop reason: HOSPADM

## 2024-03-11 RX ADMIN — ALLOPURINOL 200 MG: 100 TABLET ORAL at 15:34

## 2024-03-11 RX ADMIN — ATORVASTATIN CALCIUM 20 MG: 20 TABLET, FILM COATED ORAL at 20:22

## 2024-03-11 RX ADMIN — FERROUS SULFATE TAB 325 MG (65 MG ELEMENTAL FE) 1 TABLET: 325 (65 FE) TAB at 15:34

## 2024-03-11 RX ADMIN — ASPIRIN 81 MG: 81 TABLET, COATED ORAL at 20:22

## 2024-03-11 RX ADMIN — Medication 125 MCG: at 20:22

## 2024-03-11 RX ADMIN — LOSARTAN POTASSIUM 50 MG: 50 TABLET, FILM COATED ORAL at 15:28

## 2024-03-11 RX ADMIN — HEPARIN SODIUM 5000 UNITS: 5000 INJECTION INTRAVENOUS; SUBCUTANEOUS at 23:04

## 2024-03-11 RX ADMIN — HEPARIN SODIUM 5000 UNITS: 5000 INJECTION INTRAVENOUS; SUBCUTANEOUS at 15:28

## 2024-03-11 RX ADMIN — LOSARTAN POTASSIUM 50 MG: 50 TABLET ORAL at 15:28

## 2024-03-11 SDOH — SOCIAL STABILITY: SOCIAL INSECURITY: DO YOU FEEL ANYONE HAS EXPLOITED OR TAKEN ADVANTAGE OF YOU FINANCIALLY OR OF YOUR PERSONAL PROPERTY?: NO

## 2024-03-11 SDOH — SOCIAL STABILITY: SOCIAL INSECURITY: WERE YOU ABLE TO COMPLETE ALL THE BEHAVIORAL HEALTH SCREENINGS?: YES

## 2024-03-11 SDOH — SOCIAL STABILITY: SOCIAL INSECURITY: ABUSE: ADULT

## 2024-03-11 SDOH — SOCIAL STABILITY: SOCIAL INSECURITY: DO YOU FEEL UNSAFE GOING BACK TO THE PLACE WHERE YOU ARE LIVING?: NO

## 2024-03-11 SDOH — SOCIAL STABILITY: SOCIAL INSECURITY: ARE THERE ANY APPARENT SIGNS OF INJURIES/BEHAVIORS THAT COULD BE RELATED TO ABUSE/NEGLECT?: NO

## 2024-03-11 SDOH — SOCIAL STABILITY: SOCIAL INSECURITY: DOES ANYONE TRY TO KEEP YOU FROM HAVING/CONTACTING OTHER FRIENDS OR DOING THINGS OUTSIDE YOUR HOME?: NO

## 2024-03-11 SDOH — SOCIAL STABILITY: SOCIAL INSECURITY: ARE YOU OR HAVE YOU BEEN THREATENED OR ABUSED PHYSICALLY, EMOTIONALLY, OR SEXUALLY BY ANYONE?: NO

## 2024-03-11 SDOH — SOCIAL STABILITY: SOCIAL INSECURITY: HAS ANYONE EVER THREATENED TO HURT YOUR FAMILY OR YOUR PETS?: NO

## 2024-03-11 SDOH — SOCIAL STABILITY: SOCIAL INSECURITY: HAVE YOU HAD THOUGHTS OF HARMING ANYONE ELSE?: NO

## 2024-03-11 ASSESSMENT — LIFESTYLE VARIABLES
EVER FELT BAD OR GUILTY ABOUT YOUR DRINKING: NO
HAVE YOU EVER FELT YOU SHOULD CUT DOWN ON YOUR DRINKING: NO
EVER HAD A DRINK FIRST THING IN THE MORNING TO STEADY YOUR NERVES TO GET RID OF A HANGOVER: NO
AUDIT-C TOTAL SCORE: 0
SKIP TO QUESTIONS 9-10: 1
HAVE PEOPLE ANNOYED YOU BY CRITICIZING YOUR DRINKING: NO
HOW OFTEN DO YOU HAVE 6 OR MORE DRINKS ON ONE OCCASION: NEVER
AUDIT-C TOTAL SCORE: 0
HOW OFTEN DO YOU HAVE A DRINK CONTAINING ALCOHOL: NEVER
HOW MANY STANDARD DRINKS CONTAINING ALCOHOL DO YOU HAVE ON A TYPICAL DAY: PATIENT DOES NOT DRINK

## 2024-03-11 ASSESSMENT — COGNITIVE AND FUNCTIONAL STATUS - GENERAL
DAILY ACTIVITIY SCORE: 24
PATIENT BASELINE BEDBOUND: NO
MOBILITY SCORE: 24

## 2024-03-11 ASSESSMENT — ACTIVITIES OF DAILY LIVING (ADL)
HEARING - LEFT EAR: FUNCTIONAL
ASSISTIVE_DEVICE: WALKER
LACK_OF_TRANSPORTATION: NO
BATHING: INDEPENDENT
DRESSING YOURSELF: INDEPENDENT
FEEDING YOURSELF: INDEPENDENT
GROOMING: INDEPENDENT
LACK_OF_TRANSPORTATION: NO
WALKS IN HOME: INDEPENDENT
PATIENT'S MEMORY ADEQUATE TO SAFELY COMPLETE DAILY ACTIVITIES?: YES
TOILETING: INDEPENDENT
JUDGMENT_ADEQUATE_SAFELY_COMPLETE_DAILY_ACTIVITIES: YES
ADEQUATE_TO_COMPLETE_ADL: YES
HEARING - RIGHT EAR: FUNCTIONAL

## 2024-03-11 ASSESSMENT — ENCOUNTER SYMPTOMS
DIZZINESS: 0
CHILLS: 0
FATIGUE: 0

## 2024-03-11 ASSESSMENT — PAIN - FUNCTIONAL ASSESSMENT
PAIN_FUNCTIONAL_ASSESSMENT: 0-10

## 2024-03-11 ASSESSMENT — PAIN SCALES - GENERAL
PAINLEVEL_OUTOF10: 0 - NO PAIN

## 2024-03-11 ASSESSMENT — COLUMBIA-SUICIDE SEVERITY RATING SCALE - C-SSRS
2. HAVE YOU ACTUALLY HAD ANY THOUGHTS OF KILLING YOURSELF?: NO
1. IN THE PAST MONTH, HAVE YOU WISHED YOU WERE DEAD OR WISHED YOU COULD GO TO SLEEP AND NOT WAKE UP?: NO
6. HAVE YOU EVER DONE ANYTHING, STARTED TO DO ANYTHING, OR PREPARED TO DO ANYTHING TO END YOUR LIFE?: NO

## 2024-03-11 ASSESSMENT — PATIENT HEALTH QUESTIONNAIRE - PHQ9
SUM OF ALL RESPONSES TO PHQ9 QUESTIONS 1 & 2: 0
2. FEELING DOWN, DEPRESSED OR HOPELESS: NOT AT ALL
1. LITTLE INTEREST OR PLEASURE IN DOING THINGS: NOT AT ALL

## 2024-03-11 NOTE — ED TRIAGE NOTES
Patient sent from Dr. Rodriguez's office for a complete heart block on her EKG. Patient states she has no complaints but a week ago she did get dizzy and fall while using her walker.

## 2024-03-11 NOTE — HOSPITAL COURSE
Lilly Parr is a 83 y.o. female with a PMH of breast cancer, HTN, anemia, CKD, Gout, GERD, hypothyroidism HLD presents to [facility] ED with a chief complaint of fainting/bradycardia. She had a prior episode around 2/27 where she felt flushed/hot and fainted. Also occurred again earlier last week with similar symptoms. Denies fever,chills, shortness of breath, sick contacts. She was referred to see cardiology (Dr. Rodriguez). Saw cardiology in office today who referred her to come to [facility] ED due to concerns of bradycardia.     In the ED, she was found to have 3rd degree heart block with HR in low 30s. Admitted and transferred to ICU. Cardiology consulted. Pacemaker placed on 3/13. Tolerated procedure well and cleared for discharge on 3/14.

## 2024-03-11 NOTE — ED PROVIDER NOTES
CC: abnormal EKG    HPI:  Patient is a 83-year-old female with PMH of HTN, HLD, CKD 3, iron deficiency anemia, and hypothyroidism, presenting to the ED for bradycardia found on EKG at cardiologist office.  Patient states that around the beginning of the month did have 2 episodes of syncope.  States that the first time she fell after she felt dizzy at home.  Saw primary care after that episode.  States she was seeing the cardiologist today for the first time who told her to come to the ED.  Patient denies any known cardiac history.  No recent chest pain, palpitations, shortness of breath, fatigue, or weakness.  No recent illnesses including fever or chills.  No lower extremity swelling.      Limitations to History: Documentation    Additional History Obtained from: Documentation    Records Reviewed: Recent available ED and inpatient notes reviewed in EMR.    PMHx/PSHx:  Per HPI.   - has a past medical history of Breast cancer (CMS/[facility]), CKD (chronic kidney disease), stage V (CMS/[facility]) (02/02/2023), and Personal history of other diseases of the digestive system (12/07/2018).  - has a past surgical history that includes Total hip arthroplasty (08/24/2017); Esophagogastroduodenoscopy (04/03/2013); and Breast lumpectomy.    Medications: Reviewed in EMR. See EMR for complete list of medications and doses.    Allergies:  Patient has no known allergies.    Social History:  - Tobacco:  reports that she has never smoked. She has never used smokeless tobacco.   - Alcohol:  reports no history of alcohol use.   - Illicit Drugs:  reports no history of drug use.   ???????????????????????????????????????????????????????????????  Triage Vitals:  T 36.4 °C (97.5 °F)  HR (!) 30  /85  RR 18  O2 98 % None (Room air)    PHYSICAL EXAM:   VS: As documented in the triage note and EMR flowsheet from this visit were reviewed.  Gen: Well developed. Well nourished.  Appears comfortable.  Eyes: PERRL, EOMI. Clear scerla.  HENT: NC/AT,  Mucosal membranes moist.   Neck: Supple, no cervical LAD  Resp: Non-labored breathing on RA, CTAB, no wheezes or crackles  CV: bradycardic, no murmurs  Abd: Soft, non-distended, non-tender, no rebound or guarding  Ext: no LE edema, pulses full and equal  Skin: WWP. No systemic rashes or lesions.  MSK: normal muscle bulk, no obvious joint swelling in extremities  Neuro:  AAOx3, speech fluent, MAEx4, no focal deficit  Psych: Maintains eye contact, Appropriate mood and affect  ???????????????????????????????????????????????????????????????    ED Labs/Imaging:   Labs Reviewed   CBC WITH AUTO DIFFERENTIAL - Abnormal       Result Value    WBC 8.5      nRBC 0.0      RBC 3.33 (*)     Hemoglobin 10.6 (*)     Hematocrit 33.4 (*)           MCH 31.8      MCHC 31.7 (*)     RDW 15.1 (*)     Platelets 206      Neutrophils % 69.1      Immature Granulocytes %, Automated 0.5      Lymphocytes % 17.8      Monocytes % 6.2      Eosinophils % 5.7      Basophils % 0.7      Neutrophils Absolute 5.90 (*)     Immature Granulocytes Absolute, Automated 0.04      Lymphocytes Absolute 1.52      Monocytes Absolute 0.53      Eosinophils Absolute 0.49 (*)     Basophils Absolute 0.06     COMPREHENSIVE METABOLIC PANEL - Abnormal    Glucose 121 (*)     Sodium 137      Potassium 4.5      Chloride 105      Bicarbonate 22      Anion Gap 15      Urea Nitrogen 49 (*)     Creatinine 1.53 (*)     eGFR 33 (*)     Calcium 9.2      Albumin 3.8      Alkaline Phosphatase 79      Total Protein 6.8      AST 51 (*)     Bilirubin, Total 0.4      ALT 65 (*)    TROPONIN I, HIGH SENSITIVITY - Abnormal    Troponin I, High Sensitivity 34 (*)     Narrative:     Less than 99th percentile of normal range cutoff-  Female and children under 18 years old <14 ng/L; Male <21 ng/L: Negative  Repeat testing should be performed if clinically indicated.     Female and children under 18 years old 14-50 ng/L; Male 21-50 ng/L:  Consistent with possible cardiac damage and possible  increased clinical   risk. Serial measurements may help to assess extent of myocardial damage.     >50 ng/L: Consistent with cardiac damage, increased clinical risk and  myocardial infarction. Serial measurements may help assess extent of   myocardial damage.      NOTE: Children less than 1 year old may have higher baseline troponin   levels and results should be interpreted in conjunction with the overall   clinical context.     NOTE: Troponin I testing is performed using a different   testing methodology at [facility] than at other   [facility]. Direct result comparisons should only   be made within the same method.   B-TYPE NATRIURETIC PEPTIDE - Abnormal    BNP 2,257 (*)     Narrative:        <100 pg/mL - Heart failure unlikely  100-299 pg/mL - Intermediate probability of acute heart                  failure exacerbation. Correlate with clinical                  context and patient history.    >=300 pg/mL - Heart Failure likely. Correlate with clinical                  context and patient history.    BNP testing is performed using different testing methodology at [facility] than at other [facility]. Direct result comparisons should only be made within the same method.      TROPONIN I, HIGH SENSITIVITY - Abnormal    Troponin I, High Sensitivity 33 (*)     Narrative:     Less than 99th percentile of normal range cutoff-  Female and children under 18 years old <14 ng/L; Male <21 ng/L: Negative  Repeat testing should be performed if clinically indicated.     Female and children under 18 years old 14-50 ng/L; Male 21-50 ng/L:  Consistent with possible cardiac damage and possible increased clinical   risk. Serial measurements may help to assess extent of myocardial damage.     >50 ng/L: Consistent with cardiac damage, increased clinical risk and  myocardial infarction. Serial measurements may help assess extent of   myocardial damage.      NOTE: Children less than 1 year old may  have higher baseline troponin   levels and results should be interpreted in conjunction with the overall   clinical context.     NOTE: Troponin I testing is performed using a different   testing methodology at [facility] than at other   system hospitals. Direct result comparisons should only   be made within the same method.   MAGNESIUM - Normal    Magnesium 1.69     PHOSPHORUS - Normal    Phosphorus 3.2     PROTIME-INR - Normal    Protime 11.8      INR 1.0     TSH WITH REFLEX TO FREE T4 IF ABNORMAL - Normal    Thyroid Stimulating Hormone 1.88      Narrative:     TSH testing is performed using different testing methodology at [facility] than at other system hospitals. Direct result comparisons should only be made within the same method.     TYPE AND SCREEN    ABO TYPE A      Rh TYPE POS      ANTIBODY SCREEN NEG       XR chest 1 view   Final Result   Mild cardiomegaly.  Currently without radiographic evidence of CHF or   pneumonia.        Large stable right-sided hiatal hernia.        MACRO:   None        Signed by: Tanvir Pedraza 3/11/2024 1:23 PM   Dictation workstation:   [id number]          ED Course & [facility]   ED Course as of 03/11/24 1453   Mon Mar 11, 2024   1219 I independently interpreted the EKG:  Bradycardic. Regular rhythm. Normal axis.   Normal QRS, and Qtc intervals.   No ST segment elevations, depressions, or T wave inversions.  Impression: Complete heart block, no STEMI. [KR]      ED Course User Index  [KR] Gregoria Dangelo MD         Diagnoses as of 03/11/24 1453   Complete heart block (CMS/HCC)   Acute kidney insufficiency       Medical Decision Making:  This is a 83-year-old female with above-stated history presenting to the ED for bradycardia's on outpatient EKG sent in by cardiology.  Patient hemodynamically stable not in acute distress on arrival.  Patient bradycardic into the 20s to 30s.  EKG consistent with complete heart block.  Patient mentating appropriately with no  active chest pain.  Patient normotensive.  Patient did not take extra of her blood pressure medication and has no known cardiac history.  Pads placed and patient on active telemetry.  Patient will be admitted to the ICU for monitoring and permanent pacemaker placement per cardiology.  Patient otherwise is well-appearing, electrolytes within normal limits.  Patient agreeable to plan was admitted TO icu.       Social Determinants Limiting Care:  None identified    Disposition:  As a result of their workup, the patient will require admission to the hospital.  The patient was informed of their diagnosis.  Patient was given the opportunity to ask questions and answered them.  Patient agreed to be admitted to the hospital.    Patient seen and discussed with attending physician.    Gregoria Dangelo MD PGY3  Emergency Medicine      Procedures ? SmartLinks last updated 3/11/2024 2:53 PM        Gregoria Dangelo MD  Resident  03/11/24 [id number]

## 2024-03-11 NOTE — H&P
Medical Critical Care History and Physical    History Of Present Illness  Lilly Parr is a 83 y.o. female with a PMH of breast cancer, HTN, anemia, CKD, Gout, GERD, hypothyroidism HLD presents to [facility] ED with a chief complaint of fainting/bradycardia. She had a prior episode around 2/27 where she felt flushed/hot and fainted. Also occurred again earlier last week with similar symptoms. Denies fever,chills, shortness of breath, sick contacts. She was referred to see cardiology (Dr. Rodriguez). Saw cardiology in office today who referred her to come to [facility] ED due to concerns of bradycardia.     12 Point ROS negative unless otherwise specified above.      ED COURSE:   Vitals: Temp 97.5, /85, HR 30, RR 18, SpO2 98% on room air     Labs  Results for orders placed or performed during the hospital encounter of 03/11/24 (from the past 24 hour(s))   CBC and Auto Differential   Result Value Ref Range    WBC 8.5 4.4 - 11.3 x10*3/uL    nRBC 0.0 0.0 - 0.0 /100 WBCs    RBC 3.33 (L) 4.00 - 5.20 x10*6/uL    Hemoglobin 10.6 (L) 12.0 - 16.0 g/dL    Hematocrit 33.4 (L) 36.0 - 46.0 %     80 - 100 fL    MCH 31.8 26.0 - 34.0 pg    MCHC 31.7 (L) 32.0 - 36.0 g/dL    RDW 15.1 (H) 11.5 - 14.5 %    Platelets 206 150 - 450 x10*3/uL    Neutrophils % 69.1 40.0 - 80.0 %    Immature Granulocytes %, Automated 0.5 0.0 - 0.9 %    Lymphocytes % 17.8 13.0 - 44.0 %    Monocytes % 6.2 2.0 - 10.0 %    Eosinophils % 5.7 0.0 - 6.0 %    Basophils % 0.7 0.0 - 2.0 %    Neutrophils Absolute 5.90 (H) 1.60 - 5.50 x10*3/uL    Immature Granulocytes Absolute, Automated 0.04 0.00 - 0.50 x10*3/uL    Lymphocytes Absolute 1.52 0.80 - 3.00 x10*3/uL    Monocytes Absolute 0.53 0.05 - 0.80 x10*3/uL    Eosinophils Absolute 0.49 (H) 0.00 - 0.40 x10*3/uL    Basophils Absolute 0.06 0.00 - 0.10 x10*3/uL   Comprehensive metabolic panel   Result Value Ref Range    Glucose 121 (H) 74 - 99 mg/dL    Sodium 137 136 - 145 mmol/L    Potassium 4.5 3.5 - 5.3 mmol/L     Chloride 105 98 - 107 mmol/L    Bicarbonate 22 21 - 32 mmol/L    Anion Gap 15 10 - 20 mmol/L    Urea Nitrogen 49 (H) 6 - 23 mg/dL    Creatinine 1.53 (H) 0.50 - 1.05 mg/dL    eGFR 33 (L) >60 mL/min/1.73m*2    Calcium 9.2 8.6 - 10.3 mg/dL    Albumin 3.8 3.4 - 5.0 g/dL    Alkaline Phosphatase 79 33 - 136 U/L    Total Protein 6.8 6.4 - 8.2 g/dL    AST 51 (H) 9 - 39 U/L    Bilirubin, Total 0.4 0.0 - 1.2 mg/dL    ALT 65 (H) 7 - 45 U/L   Magnesium   Result Value Ref Range    Magnesium 1.69 1.60 - 2.40 mg/dL   Phosphorus   Result Value Ref Range    Phosphorus 3.2 2.5 - 4.9 mg/dL   Protime-INR   Result Value Ref Range    Protime 11.8 9.8 - 12.8 seconds    INR 1.0 0.9 - 1.1   Troponin I, High Sensitivity   Result Value Ref Range    Troponin I, High Sensitivity 34 (H) 0 - 13 ng/L   B-Type Natriuretic Peptide   Result Value Ref Range    BNP 2,257 (H) 0 - 99 pg/mL   TSH with reflex to Free T4 if abnormal   Result Value Ref Range    Thyroid Stimulating Hormone 1.88 0.44 - 3.98 mIU/L   Type and Screen   Result Value Ref Range    ABO TYPE A     Rh TYPE POS     ANTIBODY SCREEN NEG    ECG 12 lead   Result Value Ref Range    Ventricular Rate 29 BPM    Atrial Rate 36 BPM    QRS Duration 88 ms    QT Interval 564 ms    QTC Calculation(Bazett) 391 ms    R Axis 4 degrees    T Axis 26 degrees    QRS Count 5 beats    Q Onset 217 ms    T Offset 499 ms    QTC Fredericia 442 ms   Troponin I, High Sensitivity   Result Value Ref Range    Troponin I, High Sensitivity 33 (H) 0 - 13 ng/L     Imaging:  ECG 12 lead    Result Date: 3/11/2024  Marked sinus bradycardia with complete heart block and Junctional bradycardia Possible Anterior infarct , age undetermined Abnormal ECG When compared with ECG of 04-APR-2017 09:32, Junctional rhythm has replaced Sinus rhythm Vent. rate has decreased BY  33 BPM    XR chest 1 view    Result Date: 3/11/2024  Interpreted By:  Tanvir Pedraza, STUDY: XR CHEST 1 VIEW;  3/11/2024 12:57 pm   INDICATION:  Signs/Symptoms:complete heart block.   COMPARISON: CT scan chest from 10/06/2023. Chest x-ray from 10/22/2020.   ACCESSION NUMBER(S): [id number]   ORDERING CLINICIAN: RENNY KELLER   TECHNIQUE: Single AP portable view of the chest was obtained.   FINDINGS: MEDIASTINUM/ LUNGS/ JONATHAN: Mild cardiomegaly, currently without vascular congestion, or pleural effusion. Stable large right-sided hiatal hernia. No abnormal opacity in either lung worrisome for tumor or pneumonia. No pneumothorax. No tracheal deviation. No abnormal hilar fullness or gross mass on either side.   BONES: No lytic or blastic destructive bone lesion.   UPPER ABDOMEN: Grossly intact.       Mild cardiomegaly.  Currently without radiographic evidence of CHF or pneumonia.   Large stable right-sided hiatal hernia.   MACRO: None   Signed by: Renny Pedraza 3/11/2024 1:23 PM Dictation workstation:   [id number]       Interventions:   - cardiology consulted      Social History:   - Coming from home  - Tobacco: denies usage  - Alcohol: denies usage  - Illicit Drug: denies usage     Code Status: FULL  Emergency Contact: son Thomas [phone] or  Hemal [phone]    Past Medical History  Past Medical History:   Diagnosis Date    Breast cancer (CMS/HCC)     CKD (chronic kidney disease), stage V (CMS/HCC) 02/02/2023    Personal history of other diseases of the digestive system 12/07/2018    History of Harp's esophagus   -Breast cancer - stage I ER/OR positive and HER-2/garry negative right breast cancer status post lumpectomy 5/2016 followed by adjuvant Arimidex starting 6/2016  which she tolerated without side effects and was supposed to complete a 5-year course 6/2021 but decided to finish out her last prescription which  was done 9/2021   -Anemia with macrocytosis and iron deficiency as above, borderline B12  level as above started on supplement 11/2020, elevated homocystine as above , B6 deficiency 12/2020  -Chronic kidney disease.  2/18/2021 renal  ultrasound showed right kidney 9 cm, left kidney 8.6 cm, no hydronephrosis,  nonspecific mild circumferential urinary bladder wall could be under distention.  -Lower extremity edema secondary to chronic kidney disease  -History of bleeding ulcer  in her 50s requiring transfusion, Celestine's ulcers in 9/2014, colonoscopy 9/2014, EGD 1/2019 with hiatal hernia but no ulcers and Harp's esophagus, history of colitis  -GERD , subsequently resolved  -Hypothyroidism  -Gout  -Hypertension.  Amlodipine was added June 2022.  -Hyperlipidemia  -History of hyperkalemia   -Elevated glucose  -Shoulder dislocation  -COVID 11/2020 managed as an outpatient.  Chest x-ray 10/2020 showed large hiatal hernia,  trace effusions versus pleural thickening, COPD.  2/2/2021 CT chest showed no effusion, severe mitral valve calcification, large hiatal hernia, 2 mm nodule right lower lobe, atelectasis.   -Vitamin D deficiency   -Eosinophilia   -Cutaneous candidiasis    Surgical History  Past Surgical History:   Procedure Laterality Date    BREAST LUMPECTOMY      ESOPHAGOGASTRODUODENOSCOPY  04/03/2013    Diagnostic Esophagogastroduodenoscopy    TOTAL HIP ARTHROPLASTY  08/24/2017    Total Hip Replacement        Social History  She reports that she has never smoked. She has never used smokeless tobacco. She reports that she does not drink alcohol and does not use drugs.    Family History  Family History   Problem Relation Name Age of Onset    Other (Other) Mother          Cardiac Faliure    Asthma Father      Breast cancer Sister          Allergies  Patient has no known allergies.    Review of Systems   Constitutional:  Negative for chills and fatigue.   Eyes:  Negative for visual disturbance.   Neurological:  Positive for syncope. Negative for dizziness.   All other systems reviewed and are negative.    Physical Exam  Vitals reviewed.   HENT:      Head: Normocephalic and atraumatic.   Eyes:      Extraocular Movements: Extraocular movements intact.  "     Conjunctiva/sclera: Conjunctivae normal.      Pupils: Pupils are equal, round, and reactive to light.   Cardiovascular:      Rate and Rhythm: Regular rhythm. Bradycardia present.      Pulses: Normal pulses.      Heart sounds: Normal heart sounds. No murmur heard.     No gallop.      Comments: HR upper 20s - 50s  Pulmonary:      Effort: No respiratory distress.      Breath sounds: Normal breath sounds.   Abdominal:      General: Abdomen is flat. There is no distension.      Palpations: Abdomen is soft.      Tenderness: There is no abdominal tenderness.   Musculoskeletal:      Right lower leg: No edema.      Left lower leg: No edema.   Skin:     General: Skin is warm and dry.   Neurological:      Mental Status: She is alert. Mental status is at baseline.   Psychiatric:         Mood and Affect: Mood normal.         Behavior: Behavior normal.       Last Recorded Vitals  Blood pressure 120/85, pulse (!) 36, temperature 36.4 °C (97.5 °F), temperature source Temporal, resp. rate 15, height 1.6 m (5' 3\"), weight 80.3 kg (177 lb), SpO2 (!) 92 %.     Assessment/Plan   Lilly Parr is a 83 y.o. female with a PMH of breast cancer, HTN, anemia, CKD, Gout, GERD, hypothyroidism HLD presents to [facility] ED with a chief complaint of fainting/bradycardia. Admitted to ICU for complete heart block.     NEURO/PSYCH  -no acute findings    Cardiovascular  3rd degree heart block  -EKG - 3rd degree heart block, bradycardia  -BNP 2257  -cardiology consulted - Dr. Rodriguez to consider pacemaker placement on 3/13  -NPO on Tues evening, will hold heparin subQ on 3/12 evening  -avoid SA node blocking agents  -ECHO ordered  [ ] consider subcutaneous pacing     Hypertension  -cont home losartan 50mg daily  -holding home amlodipine 2.5mg daily / carvedilol 25mg BID    HLD  -cont home atorvastatin 20mg daily, ASA 81mg daily    Respiratory  -no acute findings    GI  GERD  -cont protonix 40mg daily    Elevated LFTs  -ALT 65, AST 51  -neg " hepatitis panel - 2020   -cont to monitor    Renal  CKD 3  -baseline Cr 1.3-1.4  -no electrolyte abnormalities  -cont to monitor    Gout  -cont home allopurinol 200mg daily    Heme/Onc  Anemia of chronic disease  -cont with home B6/B12 / iron supplement    Hx of stage I breast cancer  -s/p 5yr course of Arimidex    Endo  Hypothyroidism  -cont home levothyroxine 50mcg    Rheum  -no acute issues      MSK/Skin  Hx of cutaneous candidiasis   -previously used nystatin under breast - no longer needed    Fluids: PRN  Electrolytes: Will replace as needed   Nutrition: Reg diet  GI Ppx: Protonix  DVT Ppx: Heparin subq  Code Status: FULL  Abx: None  Lines: 2x PIV  Emergency Contact: son Thomas [phone] or  Hemal [phone]    Disposition. Admitted to ICU for 3rd degree heart block. Cardiology following. Estimated length of stay <2 nights.     Maximo Ramirez, DO  Internal Medicine, PGY-I

## 2024-03-11 NOTE — Clinical Note
The PACEMAKER, DUAL CHAMBER, AJCK MRI XT DR - [id number] device was inserted. The leads were placed into the connector and visually verified to be in correct position. Injury current obtained.

## 2024-03-11 NOTE — PROGRESS NOTES
Pharmacy Medication History Review    Lilly Parr is a 83 y.o. female admitted for No Principal Problem: There is no principal problem currently on the Problem List. Please update the Problem List and refresh.. Pharmacy reviewed the patient's [id number] medications and allergies for accuracy.    The list below reflectives the updated PTA list. Please review each medication in order reconciliation for additional clarification and justification.  Prior to Admission medications    Medication Sig Start Date End Date Taking? Authorizing Provider   allopurinol (Zyloprim) 100 mg tablet Take 2 tablets (200 mg) by mouth once daily.  Patient taking differently: Take 1 tablet (100 mg) by mouth 2 times a day. 10/4/23   Jarvis Fuentes MD   amLODIPine (Norvasc) 2.5 mg tablet Take 1 tablet (2.5 mg) by mouth once daily.  Patient taking differently: Take 1 tablet (2.5 mg) by mouth once daily in the morning. 2/6/24   Jarvis Fuentes MD   aspirin 81 mg EC tablet Take 1 tablet (81 mg) by mouth once daily in the evening. 2/24/17   Historical Provider, MD   atorvastatin (Lipitor) 20 mg tablet Take 1 tablet (20 mg) by mouth once daily.  Patient taking differently: Take 1 tablet (20 mg) by mouth once daily at bedtime. 2/6/24   Jarvis Fuentes MD   carvedilol (Coreg) 25 mg tablet Take 1 tablet (25 mg) by mouth 2 times a day with meals. 2/6/24   Jarvis Fuentes MD   cholecalciferol (Vitamin D-3) 125 MCG (5000 UT) capsule Take 1 capsule (125 mcg) by mouth once daily in the evening. 7/8/13   Historical MD Todd   cyanocobalamin, vitamin B-12, 5,000 mcg tablet, sublingual Place 1 tablet under the tongue once daily at noon. Take with meals. 11/2/20   Historical Provider, MD   ferrous sulfate, 325 mg ferrous sulfate, (FeroSuL) tablet Take 1 tablet by mouth 2 times a day. 2/6/24   Jarvis Fuentes MD   folic acid (Folvite) 1 mg tablet Take 1 tablet (1 mg) by mouth once daily.  Patient taking differently: Take 1 tablet  (1 mg) by mouth once daily at noon. Take with meals. 10/4/23   Jarvis Fuentes MD   levothyroxine (Synthroid, Levoxyl) 50 mcg tablet Take 1 tablet (50 mcg) by mouth once daily.  Patient taking differently: Take 1 tablet (50 mcg) by mouth once daily in the morning. 2/6/24   Jarvis Fuentes MD   losartan (Cozaar) 100 mg tablet Take 0.5 tablets (50 mg) by mouth once daily.  Patient taking differently: Take 0.5 tablets (50 mg) by mouth once daily in the morning. 2/6/24   Jarvis Fuentes MD   nystatin (Mycostatin) 100,000 unit/gram powder Apply topically 3 times a day.  Patient not taking: Reported on 3/11/2024 10/3/23 10/2/24  Flor Barton MD   omeprazole (PriLOSEC) 20 mg DR capsule Take 1 capsule (20 mg) by mouth once daily in the morning. Take before meals. Before eating  Patient taking differently: Take 1 capsule (20 mg) by mouth once daily at bedtime. Before eating 10/4/23   Jarvis Fuentes MD   Vitamin B-6 25 mg tablet Take 1 tablet (25 mg) by mouth once daily at noon. Take with meals. 8/21/23   Historical Provider, MD        The list below reflectives the updated allergy list. Please review each documented allergy for additional clarification and justification.  Allergies  Reviewed by Osvaldo Herrera RN on 3/11/2024   No Known Allergies         Below are additional concerns with the patient's PTA list.      Dee Malagon

## 2024-03-11 NOTE — CARE PLAN
The patient's goals for the shift include      The clinical goals for the shift include pt will remain asymptomatic d/t bradycardia during shift    Over the shift, the patient did make progress toward the following goals and remained asymptomatic. She remains bradycardic with HR in the 20s and 30s

## 2024-03-11 NOTE — Clinical Note
Patient direct to lab from ICU (no APC prep completed, pertinent questions asked at bedside by this RN with family present and marked on SBAR)

## 2024-03-11 NOTE — Clinical Note
The ECG shows a paced rhythm. The patient has permanent pacemaker. Pacer on demand mode. ECG rate  = 67 bpm.

## 2024-03-11 NOTE — PROGRESS NOTES
03/11/24 1434   Discharge Planning   Living Arrangements Spouse/significant other   Support Systems Spouse/significant other   Assistance Needed A&Ox3; independent with ADLs with walker;doesn't drive; room air baseline and currently on room air   Type of Residence Private residence   Number of Stairs to Enter Residence 4   Number of Stairs Within Residence 0   Do you have animals or pets at home? No   Who is requesting discharge planning? Provider   Patient expects to be discharged to:  Home Health if needed   Does the patient need discharge transport arranged? Yes   RoundTrip coordination needed? Yes   Has discharge transport been arranged? No   Financial Resource Strain   How hard is it for you to pay for the very basics like food, housing, medical care, and heating? Not hard   Housing Stability   In the last 12 months, was there a time when you were not able to pay the mortgage or rent on time? N   In the last 12 months, how many places have you lived? 1   In the last 12 months, was there a time when you did not have a steady place to sleep or slept in a shelter (including now)? N   Transportation Needs   In the past 12 months, has lack of transportation kept you from medical appointments or from getting medications? no   In the past 12 months, has lack of transportation kept you from meetings, work, or from getting things needed for daily living? No     03/11/2024 1435pm  Spoke with patient and family bedside in ED. At this time, patient is open to  Home Health if needed.

## 2024-03-12 ENCOUNTER — APPOINTMENT (OUTPATIENT)
Dept: CARDIOLOGY | Facility: HOSPITAL | Age: 84
DRG: 243 | End: 2024-03-12
Payer: MEDICARE

## 2024-03-12 LAB
ALBUMIN SERPL BCP-MCNC: 3.3 G/DL (ref 3.4–5)
ALP SERPL-CCNC: 69 U/L (ref 33–136)
ALT SERPL W P-5'-P-CCNC: 41 U/L (ref 7–45)
ANION GAP SERPL CALC-SCNC: 13 MMOL/L (ref 10–20)
AST SERPL W P-5'-P-CCNC: 26 U/L (ref 9–39)
ATRIAL RATE: 36 BPM
BILIRUB DIRECT SERPL-MCNC: 0.1 MG/DL (ref 0–0.3)
BILIRUB SERPL-MCNC: 0.4 MG/DL (ref 0–1.2)
BUN SERPL-MCNC: 42 MG/DL (ref 6–23)
CALCIUM SERPL-MCNC: 9 MG/DL (ref 8.6–10.3)
CHLORIDE SERPL-SCNC: 111 MMOL/L (ref 98–107)
CO2 SERPL-SCNC: 22 MMOL/L (ref 21–32)
CREAT SERPL-MCNC: 1.15 MG/DL (ref 0.5–1.05)
EGFRCR SERPLBLD CKD-EPI 2021: 47 ML/MIN/1.73M*2
EJECTION FRACTION APICAL 4 CHAMBER: 66.2
EJECTION FRACTION: 65 %
ERYTHROCYTE [DISTWIDTH] IN BLOOD BY AUTOMATED COUNT: 15.1 % (ref 11.5–14.5)
GLUCOSE SERPL-MCNC: 99 MG/DL (ref 74–99)
HCT VFR BLD AUTO: 32.5 % (ref 36–46)
HGB BLD-MCNC: 9.8 G/DL (ref 12–16)
LEFT VENTRICLE INTERNAL DIMENSION DIASTOLE: 4.74 CM (ref 3.5–6)
LEFT VENTRICULAR OUTFLOW TRACT DIAMETER: 2 CM
MAGNESIUM SERPL-MCNC: 1.75 MG/DL (ref 1.6–2.4)
MCH RBC QN AUTO: 30.9 PG (ref 26–34)
MCHC RBC AUTO-ENTMCNC: 30.2 G/DL (ref 32–36)
MCV RBC AUTO: 103 FL (ref 80–100)
MITRAL VALVE E/A RATIO: 1
NRBC BLD-RTO: 0 /100 WBCS (ref 0–0)
PHOSPHATE SERPL-MCNC: 3.2 MG/DL (ref 2.5–4.9)
PLATELET # BLD AUTO: 164 X10*3/UL (ref 150–450)
POTASSIUM SERPL-SCNC: 4 MMOL/L (ref 3.5–5.3)
PROT SERPL-MCNC: 5.7 G/DL (ref 6.4–8.2)
Q ONSET: 217 MS
QRS COUNT: 5 BEATS
QRS DURATION: 88 MS
QT INTERVAL: 564 MS
QTC CALCULATION(BAZETT): 391 MS
QTC FREDERICIA: 442 MS
R AXIS: 4 DEGREES
RBC # BLD AUTO: 3.17 X10*6/UL (ref 4–5.2)
RIGHT VENTRICLE PEAK SYSTOLIC PRESSURE: 27.9 MMHG
SODIUM SERPL-SCNC: 142 MMOL/L (ref 136–145)
T AXIS: 26 DEGREES
T OFFSET: 499 MS
VENTRICULAR RATE: 29 BPM
WBC # BLD AUTO: 8.2 X10*3/UL (ref 4.4–11.3)

## 2024-03-12 PROCEDURE — 85027 COMPLETE CBC AUTOMATED: CPT

## 2024-03-12 PROCEDURE — 2500000001 HC RX 250 WO HCPCS SELF ADMINISTERED DRUGS (ALT 637 FOR MEDICARE OP)

## 2024-03-12 PROCEDURE — 2500000004 HC RX 250 GENERAL PHARMACY W/ HCPCS (ALT 636 FOR OP/ED)

## 2024-03-12 PROCEDURE — 93306 TTE W/DOPPLER COMPLETE: CPT

## 2024-03-12 PROCEDURE — 99291 CRITICAL CARE FIRST HOUR: CPT | Performed by: INTERNAL MEDICINE

## 2024-03-12 PROCEDURE — 84100 ASSAY OF PHOSPHORUS: CPT

## 2024-03-12 PROCEDURE — 82248 BILIRUBIN DIRECT: CPT

## 2024-03-12 PROCEDURE — 2020000001 HC ICU ROOM DAILY

## 2024-03-12 PROCEDURE — 83735 ASSAY OF MAGNESIUM: CPT

## 2024-03-12 PROCEDURE — 36415 COLL VENOUS BLD VENIPUNCTURE: CPT

## 2024-03-12 RX ORDER — ACETAMINOPHEN 500 MG
5 TABLET ORAL NIGHTLY PRN
Status: DISCONTINUED | OUTPATIENT
Start: 2024-03-12 | End: 2024-03-14 | Stop reason: HOSPADM

## 2024-03-12 RX ADMIN — ASPIRIN 81 MG: 81 TABLET, COATED ORAL at 20:46

## 2024-03-12 RX ADMIN — LEVOTHYROXINE SODIUM 50 MCG: 50 TABLET ORAL at 08:18

## 2024-03-12 RX ADMIN — HEPARIN SODIUM 5000 UNITS: 5000 INJECTION INTRAVENOUS; SUBCUTANEOUS at 06:33

## 2024-03-12 RX ADMIN — ATORVASTATIN CALCIUM 20 MG: 20 TABLET, FILM COATED ORAL at 20:46

## 2024-03-12 RX ADMIN — LOSARTAN POTASSIUM 50 MG: 50 TABLET, FILM COATED ORAL at 08:18

## 2024-03-12 RX ADMIN — Medication 5 MG: at 20:46

## 2024-03-12 RX ADMIN — ALLOPURINOL 200 MG: 100 TABLET ORAL at 08:18

## 2024-03-12 RX ADMIN — PYRIDOXINE HCL TAB 50 MG 25 MG: 50 TAB at 11:35

## 2024-03-12 RX ADMIN — Medication 125 MCG: at 20:46

## 2024-03-12 RX ADMIN — FERROUS SULFATE TAB 325 MG (65 MG ELEMENTAL FE) 1 TABLET: 325 (65 FE) TAB at 08:18

## 2024-03-12 RX ADMIN — PANTOPRAZOLE SODIUM 40 MG: 40 TABLET, DELAYED RELEASE ORAL at 06:32

## 2024-03-12 RX ADMIN — HEPARIN SODIUM 5000 UNITS: 5000 INJECTION INTRAVENOUS; SUBCUTANEOUS at 15:50

## 2024-03-12 RX ADMIN — FOLIC ACID 1 MG: 1 TABLET ORAL at 11:35

## 2024-03-12 RX ADMIN — PERFLUTREN 1 ML OF DILUTION: 6.52 INJECTION, SUSPENSION INTRAVENOUS at 08:40

## 2024-03-12 ASSESSMENT — ENCOUNTER SYMPTOMS
PALPITATIONS: 0
DIZZINESS: 1
FATIGUE: 1
CHEST TIGHTNESS: 0
UNEXPECTED WEIGHT CHANGE: 0
SHORTNESS OF BREATH: 0
ABDOMINAL DISTENTION: 0
HEADACHES: 0
ABDOMINAL PAIN: 0

## 2024-03-12 ASSESSMENT — PAIN SCALES - GENERAL
PAINLEVEL_OUTOF10: 0 - NO PAIN

## 2024-03-12 ASSESSMENT — PAIN - FUNCTIONAL ASSESSMENT
PAIN_FUNCTIONAL_ASSESSMENT: 0-10

## 2024-03-12 NOTE — PROGRESS NOTES
"Medical Critical Care Daily Progress Note    Lilly Parr is a 83 y.o. female on day 1 of admission presenting with Complete heart block (CMS/HCC).    Subjective   Appears comfortable this morning.  No acute complaints.  Denies symptoms overnight including dizziness/lightheadedness.    Objective     Physical Exam  Vitals reviewed.   HENT:      Head: Normocephalic and atraumatic.   Eyes:      Extraocular Movements: Extraocular movements intact.      Conjunctiva/sclera: Conjunctivae normal.      Pupils: Pupils are equal, round, and reactive to light.   Cardiovascular:      Rate and Rhythm: Regular rhythm. Bradycardia present.      Heart sounds: Normal heart sounds. No murmur heard.     No gallop.      Comments: HR 30s   Pulmonary:      Effort: No respiratory distress.      Breath sounds: Normal breath sounds. No wheezing.   Abdominal:      General: Abdomen is flat. There is no distension.      Palpations: Abdomen is soft.      Tenderness: There is no abdominal tenderness.   Musculoskeletal:      Right lower leg: No edema.      Left lower leg: No edema.   Skin:     General: Skin is warm and dry.   Neurological:      Mental Status: She is alert. Mental status is at baseline.   Psychiatric:         Mood and Affect: Mood normal.         Behavior: Behavior normal.         Last Recorded Vitals  Blood pressure 122/56, pulse (!) 39, temperature 35.8 °C (96.4 °F), temperature source Temporal, resp. rate 18, height 1.6 m (5' 3\"), weight 82.8 kg (182 lb 8.7 oz), SpO2 97 %.  Intake/Output last 3 Shifts:  I/O last 3 completed shifts:  In: 120 (1.4 mL/kg) [P.O.:120]  Out: 660 (8 mL/kg) [Urine:660 (0.2 mL/kg/hr)]  Weight: 82.8 kg     Relevant Results  Results for orders placed or performed during the hospital encounter of 03/11/24 (from the past 24 hour(s))   CBC and Auto Differential   Result Value Ref Range    WBC 8.5 4.4 - 11.3 x10*3/uL    nRBC 0.0 0.0 - 0.0 /100 WBCs    RBC 3.33 (L) 4.00 - 5.20 x10*6/uL    Hemoglobin 10.6 " (L) 12.0 - 16.0 g/dL    Hematocrit 33.4 (L) 36.0 - 46.0 %     80 - 100 fL    MCH 31.8 26.0 - 34.0 pg    MCHC 31.7 (L) 32.0 - 36.0 g/dL    RDW 15.1 (H) 11.5 - 14.5 %    Platelets 206 150 - 450 x10*3/uL    Neutrophils % 69.1 40.0 - 80.0 %    Immature Granulocytes %, Automated 0.5 0.0 - 0.9 %    Lymphocytes % 17.8 13.0 - 44.0 %    Monocytes % 6.2 2.0 - 10.0 %    Eosinophils % 5.7 0.0 - 6.0 %    Basophils % 0.7 0.0 - 2.0 %    Neutrophils Absolute 5.90 (H) 1.60 - 5.50 x10*3/uL    Immature Granulocytes Absolute, Automated 0.04 0.00 - 0.50 x10*3/uL    Lymphocytes Absolute 1.52 0.80 - 3.00 x10*3/uL    Monocytes Absolute 0.53 0.05 - 0.80 x10*3/uL    Eosinophils Absolute 0.49 (H) 0.00 - 0.40 x10*3/uL    Basophils Absolute 0.06 0.00 - 0.10 x10*3/uL   Comprehensive metabolic panel   Result Value Ref Range    Glucose 121 (H) 74 - 99 mg/dL    Sodium 137 136 - 145 mmol/L    Potassium 4.5 3.5 - 5.3 mmol/L    Chloride 105 98 - 107 mmol/L    Bicarbonate 22 21 - 32 mmol/L    Anion Gap 15 10 - 20 mmol/L    Urea Nitrogen 49 (H) 6 - 23 mg/dL    Creatinine 1.53 (H) 0.50 - 1.05 mg/dL    eGFR 33 (L) >60 mL/min/1.73m*2    Calcium 9.2 8.6 - 10.3 mg/dL    Albumin 3.8 3.4 - 5.0 g/dL    Alkaline Phosphatase 79 33 - 136 U/L    Total Protein 6.8 6.4 - 8.2 g/dL    AST 51 (H) 9 - 39 U/L    Bilirubin, Total 0.4 0.0 - 1.2 mg/dL    ALT 65 (H) 7 - 45 U/L   Magnesium   Result Value Ref Range    Magnesium 1.69 1.60 - 2.40 mg/dL   Phosphorus   Result Value Ref Range    Phosphorus 3.2 2.5 - 4.9 mg/dL   Protime-INR   Result Value Ref Range    Protime 11.8 9.8 - 12.8 seconds    INR 1.0 0.9 - 1.1   Troponin I, High Sensitivity   Result Value Ref Range    Troponin I, High Sensitivity 34 (H) 0 - 13 ng/L   B-Type Natriuretic Peptide   Result Value Ref Range    BNP 2,257 (H) 0 - 99 pg/mL   TSH with reflex to Free T4 if abnormal   Result Value Ref Range    Thyroid Stimulating Hormone 1.88 0.44 - 3.98 mIU/L   Type and Screen   Result Value Ref Range    ABO  TYPE A     Rh TYPE POS     ANTIBODY SCREEN NEG    ECG 12 lead   Result Value Ref Range    Ventricular Rate 29 BPM    Atrial Rate 36 BPM    QRS Duration 88 ms    QT Interval 564 ms    QTC Calculation(Bazett) 391 ms    R Axis 4 degrees    T Axis 26 degrees    QRS Count 5 beats    Q Onset 217 ms    T Offset 499 ms    QTC Fredericia 442 ms   Troponin I, High Sensitivity   Result Value Ref Range    Troponin I, High Sensitivity 33 (H) 0 - 13 ng/L   CBC   Result Value Ref Range    WBC 8.2 4.4 - 11.3 x10*3/uL    nRBC 0.0 0.0 - 0.0 /100 WBCs    RBC 3.17 (L) 4.00 - 5.20 x10*6/uL    Hemoglobin 9.8 (L) 12.0 - 16.0 g/dL    Hematocrit 32.5 (L) 36.0 - 46.0 %     (H) 80 - 100 fL    MCH 30.9 26.0 - 34.0 pg    MCHC 30.2 (L) 32.0 - 36.0 g/dL    RDW 15.1 (H) 11.5 - 14.5 %    Platelets 164 150 - 450 x10*3/uL   Magnesium   Result Value Ref Range    Magnesium 1.75 1.60 - 2.40 mg/dL   Hepatic function panel   Result Value Ref Range    Albumin 3.3 (L) 3.4 - 5.0 g/dL    Bilirubin, Total 0.4 0.0 - 1.2 mg/dL    Bilirubin, Direct 0.1 0.0 - 0.3 mg/dL    Alkaline Phosphatase 69 33 - 136 U/L    ALT 41 7 - 45 U/L    AST 26 9 - 39 U/L    Total Protein 5.7 (L) 6.4 - 8.2 g/dL   Phosphorus   Result Value Ref Range    Phosphorus 3.2 2.5 - 4.9 mg/dL   Basic Metabolic Panel   Result Value Ref Range    Glucose 99 74 - 99 mg/dL    Sodium 142 136 - 145 mmol/L    Potassium 4.0 3.5 - 5.3 mmol/L    Chloride 111 (H) 98 - 107 mmol/L    Bicarbonate 22 21 - 32 mmol/L    Anion Gap 13 10 - 20 mmol/L    Urea Nitrogen 42 (H) 6 - 23 mg/dL    Creatinine 1.15 (H) 0.50 - 1.05 mg/dL    eGFR 47 (L) >60 mL/min/1.73m*2    Calcium 9.0 8.6 - 10.3 mg/dL     ECG 12 lead    Result Date: 3/11/2024  Marked sinus bradycardia with complete heart block and Junctional bradycardia Possible Anterior infarct , age undetermined Abnormal ECG When compared with ECG of 04-APR-2017 09:32, Junctional rhythm has replaced Sinus rhythm Vent. rate has decreased BY  33 BPM    XR chest 1  view    Result Date: 3/11/2024  Interpreted By:  Renny Pedraza, STUDY: XR CHEST 1 VIEW;  3/11/2024 12:57 pm   INDICATION: Signs/Symptoms:complete heart block.   COMPARISON: CT scan chest from 10/06/2023. Chest x-ray from 10/22/2020.   ACCESSION NUMBER(S): [id number]   ORDERING CLINICIAN: RENNY KELLER   TECHNIQUE: Single AP portable view of the chest was obtained.   FINDINGS: MEDIASTINUM/ LUNGS/ JONATHAN: Mild cardiomegaly, currently without vascular congestion, or pleural effusion. Stable large right-sided hiatal hernia. No abnormal opacity in either lung worrisome for tumor or pneumonia. No pneumothorax. No tracheal deviation. No abnormal hilar fullness or gross mass on either side.   BONES: No lytic or blastic destructive bone lesion.   UPPER ABDOMEN: Grossly intact.       Mild cardiomegaly.  Currently without radiographic evidence of CHF or pneumonia.   Large stable right-sided hiatal hernia.   MACRO: None   Signed by: Renny Pedraza 3/11/2024 1:23 PM Dictation workstation:   [id number]       Current Facility-Administered Medications:     allopurinol (Zyloprim) tablet 200 mg, 200 mg, oral, Daily, Maximo Ramirez DO, 200 mg at 03/12/24 0818    [Held by provider] amLODIPine (Norvasc) tablet 2.5 mg, 2.5 mg, oral, q AM, Maximo Ramirez DO    aspirin EC tablet 81 mg, 81 mg, oral, q PM, Maximo Ramirez DO, 81 mg at 03/11/24 2022    atorvastatin (Lipitor) tablet 20 mg, 20 mg, oral, Nightly, Maximo Ramirez DO, 20 mg at 03/11/24 2022    cholecalciferol (Vitamin D-3) capsule 125 mcg, 5,000 Units, oral, q PM, Maximo Ramirez DO, 125 mcg at 03/11/24 2022    ferrous sulfate (325 mg ferrous sulfate) tablet 1 tablet, 1 tablet, oral, Daily, Maximo Ramirez DO, 1 tablet at 03/12/24 0818    folic acid (Folvite) tablet 1 mg, 1 mg, oral, Daily with lunch, Maximo Ramirez DO    heparin (porcine) injection 5,000 Units, 5,000 Units, subcutaneous, q8h, Maximo Ramirez, DO, 5,000 Units at 03/12/24 0682    levothyroxine (Synthroid, Levoxyl) tablet 50 mcg, 50 mcg, oral,  q AM, aMximo Ramirez DO, 50 mcg at 03/12/24 0818    losartan (Cozaar) tablet 50 mg, 50 mg, oral, q AM, Maximo Ramirez DO, 50 mg at 03/12/24 0818    pantoprazole (ProtoNix) EC tablet 40 mg, 40 mg, oral, Daily before breakfast, Maximo Ramirez DO, 40 mg at 03/12/24 0632    pyridoxine (Vitamin B-6) tablet 25 mg, 25 mg, oral, Daily with lunch, Maximo Ramirez DO       Assessment/Plan   Lilly Parr is a 83 y.o. female with a PMH of breast cancer, HTN, anemia, CKD, Gout, GERD, hypothyroidism HLD presents to [facility] ED with a chief complaint of fainting/bradycardia. Admitted to ICU for complete heart block.      Interval Events 3/12:  -Hold losartan   -plan for pacemaker placement tomorrow, NPO after midnight, hold heparin subq    NEURO/PSYCH  -no acute findings     Cardiovascular  3rd degree heart block  -EKG - 3rd degree heart block, bradycardia  -BNP 2257  -cardiology consulted - Dr. Rodriguez to consider pacemaker placement on 3/13  -NPO on Tues evening, will hold heparin subQ on 3/12 evening  -avoid SA node blocking agents  -ECHO completed - EF 55-60%  [ ] consider subcutaneous pacing      Hypertension  -hold home losartan 50mg daily  -holding home amlodipine 2.5mg daily / carvedilol 25mg BID     HLD  -cont home atorvastatin 20mg daily, ASA 81mg daily     Respiratory  -no acute findings     GI  GERD  -cont protonix 40mg daily     Elevated LFTs - resolved  -ALT 41, AST 26  -neg hepatitis panel - 2020   -cont to monitor     Renal  CKD 3  -baseline Cr 1.3-1.4  -no electrolyte abnormalities  -cont to monitor     Gout  -cont home allopurinol 200mg daily     Heme/Onc  Anemia of chronic disease  -cont with home B6/B12 / iron supplement     Hx of stage I breast cancer  -s/p 5yr course of Arimidex     Endo  Hypothyroidism  -cont home levothyroxine 50mcg     Rheum  -no acute issues     MSK/Skin  Hx of cutaneous candidiasis   -previously used nystatin under breast - no longer needed     Fluids: PRN  Electrolytes: Will replace as needed    Nutrition: Reg diet - NPO after midnight  GI Ppx: Protonix  DVT Ppx: Heparin subcutaneous - holding at midnight  Code Status: FULL  Abx: None  Lines: 2x PIV  Emergency Contact: son Thomas [phone] or  Hemal [phone]     Disposition. Admitted to ICU for 3rd degree heart block. Cardiology following and planning for pacemaker tomorrow.     Maximo Ramirez, DO  Internal Medicine, PGY-I

## 2024-03-12 NOTE — H&P (VIEW-ONLY)
Inpatient consult to cardiology  Consult performed by: Sadie Moody, MARIAM-CNP  Consult ordered by: Tanvir Albrecht MD  Reason for consult: complete heart block            History Of Present Illness  Lilly Parr is a 83 y.o. female with PMHx significant for HTN, HLD, CKD, iron deficiency anemia, hypothyroidism, breast cancer presenting with bradycardia. The patient was at her cardiology appointment when a low hear rate was observed and she was sent to the ED by personal travel due to stable status. She states that established care with cardiology after being referred by her primary care for episodes of dizziness and a fall at home in the last month. She endorses feeling hot and flushed during the episode of dizziness as well as fatigue and changes to sleeping patterns. She denies chest pain or pressure, palpitations, SOB, cough, orthopnea, edema, headache, LOC, n/v.    ED Course: HR 20s-30s on arrival  ECG: complete heart block without ST changes,   BNP 2257, Troponins 34, 33  TSH 1.88      Past Medical History  Past Medical History:   Diagnosis Date    Breast cancer (CMS/[facility])     CKD (chronic kidney disease), stage V (CMS/HCC) 02/02/2023    Personal history of other diseases of the digestive system 12/07/2018    History of Harp's esophagus       Surgical History  Past Surgical History:   Procedure Laterality Date    BREAST LUMPECTOMY      ESOPHAGOGASTRODUODENOSCOPY  04/03/2013    Diagnostic Esophagogastroduodenoscopy    TOTAL HIP ARTHROPLASTY  08/24/2017    Total Hip Replacement        Social History  She reports that she has never smoked. She has never used smokeless tobacco. She reports that she does not drink alcohol and does not use drugs.    Family History  Family History   Problem Relation Name Age of Onset    Other (Other) Mother          Cardiac Faliure    Asthma Father      Breast cancer Sister          Allergies  Patient has no known allergies.    Review of Systems  Review of Systems  "  Constitutional:  Positive for fatigue. Negative for unexpected weight change.   Eyes:  Negative for visual disturbance.   Respiratory:  Negative for chest tightness and shortness of breath.    Cardiovascular:  Negative for chest pain, palpitations and leg swelling.   Gastrointestinal:  Negative for abdominal distention and abdominal pain.   Neurological:  Positive for dizziness and syncope. Negative for headaches.   All other systems reviewed and are negative.         Physical Exam  Constitutional: Well developed, awake/alert/oriented x3, no distress, alert and cooperative  Eyes: PERRL, EOMI, clear sclera  ENMT: mucous membranes moist, no apparent injury, no lesions seen  Head/Neck: Neck supple, no apparent injury, thyroid without mass or tenderness, No JVD, trachea midline, no bruits  Respiratory/Thorax: Patent airways, CTAB, normal breath sounds with good chest expansion, thorax symmetric  Cardiovascular: Bradycardia and regular rhythm, no murmurs, 2+ equal pulses of the extremities, normal S 1and S 2  Gastrointestinal: Nondistended, soft, non-tender, no rebound tenderness or guarding, no masses palpable, no organomegaly, +BS, no bruits  Musculoskeletal: ROM intact, no joint swelling, normal strength  Extremities: normal extremities, no cyanosis edema, contusions or wounds, no clubbing  Neurological: alert and oriented x3, intact senses, motor, response and reflexes, normal strength  Lymphatic: No significant lymphadenopathy  Psychological: Appropriate mood and behavior  Skin: Warm and dry, no lesions, no rashes       Last Recorded Vitals  Blood pressure 122/58, pulse 53, temperature 35.8 °C (96.4 °F), temperature source Temporal, resp. rate 23, height 1.6 m (5' 3\"), weight 82.8 kg (182 lb 8.7 oz), SpO2 97 %.    Relevant Results    Scheduled medications  allopurinol, 200 mg, oral, Daily  [Held by provider] amLODIPine, 2.5 mg, oral, q AM  aspirin, 81 mg, oral, q PM  atorvastatin, 20 mg, oral, " Nightly  cholecalciferol, 5,000 Units, oral, q PM  ferrous sulfate (325 mg ferrous sulfate), 1 tablet, oral, Daily  folic acid, 1 mg, oral, Daily with lunch  heparin (porcine), 5,000 Units, subcutaneous, q8h  levothyroxine, 50 mcg, oral, q AM  [Held by provider] losartan, 50 mg, oral, q AM  pantoprazole, 40 mg, oral, Daily before breakfast  pyridoxine, 25 mg, oral, Daily with lunch      Continuous medications     PRN medications      Results for orders placed or performed during the hospital encounter of 03/11/24 (from the past 24 hour(s))   CBC and Auto Differential   Result Value Ref Range    WBC 8.5 4.4 - 11.3 x10*3/uL    nRBC 0.0 0.0 - 0.0 /100 WBCs    RBC 3.33 (L) 4.00 - 5.20 x10*6/uL    Hemoglobin 10.6 (L) 12.0 - 16.0 g/dL    Hematocrit 33.4 (L) 36.0 - 46.0 %     80 - 100 fL    MCH 31.8 26.0 - 34.0 pg    MCHC 31.7 (L) 32.0 - 36.0 g/dL    RDW 15.1 (H) 11.5 - 14.5 %    Platelets 206 150 - 450 x10*3/uL    Neutrophils % 69.1 40.0 - 80.0 %    Immature Granulocytes %, Automated 0.5 0.0 - 0.9 %    Lymphocytes % 17.8 13.0 - 44.0 %    Monocytes % 6.2 2.0 - 10.0 %    Eosinophils % 5.7 0.0 - 6.0 %    Basophils % 0.7 0.0 - 2.0 %    Neutrophils Absolute 5.90 (H) 1.60 - 5.50 x10*3/uL    Immature Granulocytes Absolute, Automated 0.04 0.00 - 0.50 x10*3/uL    Lymphocytes Absolute 1.52 0.80 - 3.00 x10*3/uL    Monocytes Absolute 0.53 0.05 - 0.80 x10*3/uL    Eosinophils Absolute 0.49 (H) 0.00 - 0.40 x10*3/uL    Basophils Absolute 0.06 0.00 - 0.10 x10*3/uL   Comprehensive metabolic panel   Result Value Ref Range    Glucose 121 (H) 74 - 99 mg/dL    Sodium 137 136 - 145 mmol/L    Potassium 4.5 3.5 - 5.3 mmol/L    Chloride 105 98 - 107 mmol/L    Bicarbonate 22 21 - 32 mmol/L    Anion Gap 15 10 - 20 mmol/L    Urea Nitrogen 49 (H) 6 - 23 mg/dL    Creatinine 1.53 (H) 0.50 - 1.05 mg/dL    eGFR 33 (L) >60 mL/min/1.73m*2    Calcium 9.2 8.6 - 10.3 mg/dL    Albumin 3.8 3.4 - 5.0 g/dL    Alkaline Phosphatase 79 33 - 136 U/L    Total  Protein 6.8 6.4 - 8.2 g/dL    AST 51 (H) 9 - 39 U/L    Bilirubin, Total 0.4 0.0 - 1.2 mg/dL    ALT 65 (H) 7 - 45 U/L   Magnesium   Result Value Ref Range    Magnesium 1.69 1.60 - 2.40 mg/dL   Phosphorus   Result Value Ref Range    Phosphorus 3.2 2.5 - 4.9 mg/dL   Protime-INR   Result Value Ref Range    Protime 11.8 9.8 - 12.8 seconds    INR 1.0 0.9 - 1.1   Troponin I, High Sensitivity   Result Value Ref Range    Troponin I, High Sensitivity 34 (H) 0 - 13 ng/L   B-Type Natriuretic Peptide   Result Value Ref Range    BNP 2,257 (H) 0 - 99 pg/mL   TSH with reflex to Free T4 if abnormal   Result Value Ref Range    Thyroid Stimulating Hormone 1.88 0.44 - 3.98 mIU/L   Type and Screen   Result Value Ref Range    ABO TYPE A     Rh TYPE POS     ANTIBODY SCREEN NEG    ECG 12 lead   Result Value Ref Range    Ventricular Rate 29 BPM    Atrial Rate 36 BPM    QRS Duration 88 ms    QT Interval 564 ms    QTC Calculation(Bazett) 391 ms    R Axis 4 degrees    T Axis 26 degrees    QRS Count 5 beats    Q Onset 217 ms    T Offset 499 ms    QTC Fredericia 442 ms   Troponin I, High Sensitivity   Result Value Ref Range    Troponin I, High Sensitivity 33 (H) 0 - 13 ng/L   CBC   Result Value Ref Range    WBC 8.2 4.4 - 11.3 x10*3/uL    nRBC 0.0 0.0 - 0.0 /100 WBCs    RBC 3.17 (L) 4.00 - 5.20 x10*6/uL    Hemoglobin 9.8 (L) 12.0 - 16.0 g/dL    Hematocrit 32.5 (L) 36.0 - 46.0 %     (H) 80 - 100 fL    MCH 30.9 26.0 - 34.0 pg    MCHC 30.2 (L) 32.0 - 36.0 g/dL    RDW 15.1 (H) 11.5 - 14.5 %    Platelets 164 150 - 450 x10*3/uL   Magnesium   Result Value Ref Range    Magnesium 1.75 1.60 - 2.40 mg/dL   Hepatic function panel   Result Value Ref Range    Albumin 3.3 (L) 3.4 - 5.0 g/dL    Bilirubin, Total 0.4 0.0 - 1.2 mg/dL    Bilirubin, Direct 0.1 0.0 - 0.3 mg/dL    Alkaline Phosphatase 69 33 - 136 U/L    ALT 41 7 - 45 U/L    AST 26 9 - 39 U/L    Total Protein 5.7 (L) 6.4 - 8.2 g/dL   Phosphorus   Result Value Ref Range    Phosphorus 3.2 2.5 - 4.9  mg/dL   Basic Metabolic Panel   Result Value Ref Range    Glucose 99 74 - 99 mg/dL    Sodium 142 136 - 145 mmol/L    Potassium 4.0 3.5 - 5.3 mmol/L    Chloride 111 (H) 98 - 107 mmol/L    Bicarbonate 22 21 - 32 mmol/L    Anion Gap 13 10 - 20 mmol/L    Urea Nitrogen 42 (H) 6 - 23 mg/dL    Creatinine 1.15 (H) 0.50 - 1.05 mg/dL    eGFR 47 (L) >60 mL/min/1.73m*2    Calcium 9.0 8.6 - 10.3 mg/dL   Transthoracic Echo (TTE) Complete   Result Value Ref Range    BSA 1.92 m2       Transthoracic Echo (TTE) Complete         XR chest 1 view   Final Result   Mild cardiomegaly.  Currently without radiographic evidence of CHF or   pneumonia.        Large stable right-sided hiatal hernia.        MACRO:   None        Signed by: Tanvir Pedraza 3/11/2024 1:23 PM   Dictation workstation:   [id number]          No echocardiogram results found for the past 12 months       Assessment/Plan     3rd degree AV heart block, Bradycardia  -ECG: complete heart block without ST changes,   -BNP 2257, Troponins 34, 33  -TSH 1.88  -tele reviewed: 3rd degree heartblock with bradycardia HR 30s-50s  -ECHO  -ECG for any ACS symptoms  -Pt currently stable, no symptoms  -Pacemaker implant scheduled for 3/13 with Dr. Rodriguez  -NPO after midnight  -Avoid SA marisa blocking agents  -hold BP meds  -If becomes symptomatic, will need to have temporary pacer wire placed    HTN, HLP  Hold BP meds for now->will restart post PCM  Cont statin    BONILLA on CKD  Creatinine 1.53 on admit, now improved  Monitor    Critical Care time 60 minutes    CELI Rader    I was present with the PA student who participated in the documentation of this note. I have personally seen and re-examined the patient and performed the medical decision-making components (assessment and plan of care). I have reviewed the PA student documentation and verified the findings in the note as written with additions or exceptions as stated in the body of this note.    Sadie Moody,  APRN-CNP

## 2024-03-12 NOTE — CARE PLAN
SW consulted for advance directives.  Met with pt, provided education on HCPOA and LW.  Pt does not want to complete documents at this time.  Advance directive packet left with her.

## 2024-03-12 NOTE — CONSULTS
Inpatient consult to cardiology  Consult performed by: Sadie Moody, MARIAM-CNP  Consult ordered by: Tanvir Albrecht MD  Reason for consult: complete heart block            History Of Present Illness  Lilly Parr is a 83 y.o. female with PMHx significant for HTN, HLD, CKD, iron deficiency anemia, hypothyroidism, breast cancer presenting with bradycardia. The patient was at her cardiology appointment when a low hear rate was observed and she was sent to the ED by personal travel due to stable status. She states that established care with cardiology after being referred by her primary care for episodes of dizziness and a fall at home in the last month. She endorses feeling hot and flushed during the episode of dizziness as well as fatigue and changes to sleeping patterns. She denies chest pain or pressure, palpitations, SOB, cough, orthopnea, edema, headache, LOC, n/v.    ED Course: HR 20s-30s on arrival  ECG: complete heart block without ST changes,   BNP 2257, Troponins 34, 33  TSH 1.88      Past Medical History  Past Medical History:   Diagnosis Date    Breast cancer (CMS/[facility])     CKD (chronic kidney disease), stage V (CMS/HCC) 02/02/2023    Personal history of other diseases of the digestive system 12/07/2018    History of Harp's esophagus       Surgical History  Past Surgical History:   Procedure Laterality Date    BREAST LUMPECTOMY      ESOPHAGOGASTRODUODENOSCOPY  04/03/2013    Diagnostic Esophagogastroduodenoscopy    TOTAL HIP ARTHROPLASTY  08/24/2017    Total Hip Replacement        Social History  She reports that she has never smoked. She has never used smokeless tobacco. She reports that she does not drink alcohol and does not use drugs.    Family History  Family History   Problem Relation Name Age of Onset    Other (Other) Mother          Cardiac Faliure    Asthma Father      Breast cancer Sister          Allergies  Patient has no known allergies.    Review of Systems  Review of Systems  "  Constitutional:  Positive for fatigue. Negative for unexpected weight change.   Eyes:  Negative for visual disturbance.   Respiratory:  Negative for chest tightness and shortness of breath.    Cardiovascular:  Negative for chest pain, palpitations and leg swelling.   Gastrointestinal:  Negative for abdominal distention and abdominal pain.   Neurological:  Positive for dizziness and syncope. Negative for headaches.   All other systems reviewed and are negative.         Physical Exam  Constitutional: Well developed, awake/alert/oriented x3, no distress, alert and cooperative  Eyes: PERRL, EOMI, clear sclera  ENMT: mucous membranes moist, no apparent injury, no lesions seen  Head/Neck: Neck supple, no apparent injury, thyroid without mass or tenderness, No JVD, trachea midline, no bruits  Respiratory/Thorax: Patent airways, CTAB, normal breath sounds with good chest expansion, thorax symmetric  Cardiovascular: Bradycardia and regular rhythm, no murmurs, 2+ equal pulses of the extremities, normal S 1and S 2  Gastrointestinal: Nondistended, soft, non-tender, no rebound tenderness or guarding, no masses palpable, no organomegaly, +BS, no bruits  Musculoskeletal: ROM intact, no joint swelling, normal strength  Extremities: normal extremities, no cyanosis edema, contusions or wounds, no clubbing  Neurological: alert and oriented x3, intact senses, motor, response and reflexes, normal strength  Lymphatic: No significant lymphadenopathy  Psychological: Appropriate mood and behavior  Skin: Warm and dry, no lesions, no rashes       Last Recorded Vitals  Blood pressure 122/58, pulse 53, temperature 35.8 °C (96.4 °F), temperature source Temporal, resp. rate 23, height 1.6 m (5' 3\"), weight 82.8 kg (182 lb 8.7 oz), SpO2 97 %.    Relevant Results    Scheduled medications  allopurinol, 200 mg, oral, Daily  [Held by provider] amLODIPine, 2.5 mg, oral, q AM  aspirin, 81 mg, oral, q PM  atorvastatin, 20 mg, oral, " Nightly  cholecalciferol, 5,000 Units, oral, q PM  ferrous sulfate (325 mg ferrous sulfate), 1 tablet, oral, Daily  folic acid, 1 mg, oral, Daily with lunch  heparin (porcine), 5,000 Units, subcutaneous, q8h  levothyroxine, 50 mcg, oral, q AM  [Held by provider] losartan, 50 mg, oral, q AM  pantoprazole, 40 mg, oral, Daily before breakfast  pyridoxine, 25 mg, oral, Daily with lunch      Continuous medications     PRN medications      Results for orders placed or performed during the hospital encounter of 03/11/24 (from the past 24 hour(s))   CBC and Auto Differential   Result Value Ref Range    WBC 8.5 4.4 - 11.3 x10*3/uL    nRBC 0.0 0.0 - 0.0 /100 WBCs    RBC 3.33 (L) 4.00 - 5.20 x10*6/uL    Hemoglobin 10.6 (L) 12.0 - 16.0 g/dL    Hematocrit 33.4 (L) 36.0 - 46.0 %     80 - 100 fL    MCH 31.8 26.0 - 34.0 pg    MCHC 31.7 (L) 32.0 - 36.0 g/dL    RDW 15.1 (H) 11.5 - 14.5 %    Platelets 206 150 - 450 x10*3/uL    Neutrophils % 69.1 40.0 - 80.0 %    Immature Granulocytes %, Automated 0.5 0.0 - 0.9 %    Lymphocytes % 17.8 13.0 - 44.0 %    Monocytes % 6.2 2.0 - 10.0 %    Eosinophils % 5.7 0.0 - 6.0 %    Basophils % 0.7 0.0 - 2.0 %    Neutrophils Absolute 5.90 (H) 1.60 - 5.50 x10*3/uL    Immature Granulocytes Absolute, Automated 0.04 0.00 - 0.50 x10*3/uL    Lymphocytes Absolute 1.52 0.80 - 3.00 x10*3/uL    Monocytes Absolute 0.53 0.05 - 0.80 x10*3/uL    Eosinophils Absolute 0.49 (H) 0.00 - 0.40 x10*3/uL    Basophils Absolute 0.06 0.00 - 0.10 x10*3/uL   Comprehensive metabolic panel   Result Value Ref Range    Glucose 121 (H) 74 - 99 mg/dL    Sodium 137 136 - 145 mmol/L    Potassium 4.5 3.5 - 5.3 mmol/L    Chloride 105 98 - 107 mmol/L    Bicarbonate 22 21 - 32 mmol/L    Anion Gap 15 10 - 20 mmol/L    Urea Nitrogen 49 (H) 6 - 23 mg/dL    Creatinine 1.53 (H) 0.50 - 1.05 mg/dL    eGFR 33 (L) >60 mL/min/1.73m*2    Calcium 9.2 8.6 - 10.3 mg/dL    Albumin 3.8 3.4 - 5.0 g/dL    Alkaline Phosphatase 79 33 - 136 U/L    Total  Protein 6.8 6.4 - 8.2 g/dL    AST 51 (H) 9 - 39 U/L    Bilirubin, Total 0.4 0.0 - 1.2 mg/dL    ALT 65 (H) 7 - 45 U/L   Magnesium   Result Value Ref Range    Magnesium 1.69 1.60 - 2.40 mg/dL   Phosphorus   Result Value Ref Range    Phosphorus 3.2 2.5 - 4.9 mg/dL   Protime-INR   Result Value Ref Range    Protime 11.8 9.8 - 12.8 seconds    INR 1.0 0.9 - 1.1   Troponin I, High Sensitivity   Result Value Ref Range    Troponin I, High Sensitivity 34 (H) 0 - 13 ng/L   B-Type Natriuretic Peptide   Result Value Ref Range    BNP 2,257 (H) 0 - 99 pg/mL   TSH with reflex to Free T4 if abnormal   Result Value Ref Range    Thyroid Stimulating Hormone 1.88 0.44 - 3.98 mIU/L   Type and Screen   Result Value Ref Range    ABO TYPE A     Rh TYPE POS     ANTIBODY SCREEN NEG    ECG 12 lead   Result Value Ref Range    Ventricular Rate 29 BPM    Atrial Rate 36 BPM    QRS Duration 88 ms    QT Interval 564 ms    QTC Calculation(Bazett) 391 ms    R Axis 4 degrees    T Axis 26 degrees    QRS Count 5 beats    Q Onset 217 ms    T Offset 499 ms    QTC Fredericia 442 ms   Troponin I, High Sensitivity   Result Value Ref Range    Troponin I, High Sensitivity 33 (H) 0 - 13 ng/L   CBC   Result Value Ref Range    WBC 8.2 4.4 - 11.3 x10*3/uL    nRBC 0.0 0.0 - 0.0 /100 WBCs    RBC 3.17 (L) 4.00 - 5.20 x10*6/uL    Hemoglobin 9.8 (L) 12.0 - 16.0 g/dL    Hematocrit 32.5 (L) 36.0 - 46.0 %     (H) 80 - 100 fL    MCH 30.9 26.0 - 34.0 pg    MCHC 30.2 (L) 32.0 - 36.0 g/dL    RDW 15.1 (H) 11.5 - 14.5 %    Platelets 164 150 - 450 x10*3/uL   Magnesium   Result Value Ref Range    Magnesium 1.75 1.60 - 2.40 mg/dL   Hepatic function panel   Result Value Ref Range    Albumin 3.3 (L) 3.4 - 5.0 g/dL    Bilirubin, Total 0.4 0.0 - 1.2 mg/dL    Bilirubin, Direct 0.1 0.0 - 0.3 mg/dL    Alkaline Phosphatase 69 33 - 136 U/L    ALT 41 7 - 45 U/L    AST 26 9 - 39 U/L    Total Protein 5.7 (L) 6.4 - 8.2 g/dL   Phosphorus   Result Value Ref Range    Phosphorus 3.2 2.5 - 4.9  mg/dL   Basic Metabolic Panel   Result Value Ref Range    Glucose 99 74 - 99 mg/dL    Sodium 142 136 - 145 mmol/L    Potassium 4.0 3.5 - 5.3 mmol/L    Chloride 111 (H) 98 - 107 mmol/L    Bicarbonate 22 21 - 32 mmol/L    Anion Gap 13 10 - 20 mmol/L    Urea Nitrogen 42 (H) 6 - 23 mg/dL    Creatinine 1.15 (H) 0.50 - 1.05 mg/dL    eGFR 47 (L) >60 mL/min/1.73m*2    Calcium 9.0 8.6 - 10.3 mg/dL   Transthoracic Echo (TTE) Complete   Result Value Ref Range    BSA 1.92 m2       Transthoracic Echo (TTE) Complete         XR chest 1 view   Final Result   Mild cardiomegaly.  Currently without radiographic evidence of CHF or   pneumonia.        Large stable right-sided hiatal hernia.        MACRO:   None        Signed by: Tanvir Pedraza 3/11/2024 1:23 PM   Dictation workstation:   [id number]          No echocardiogram results found for the past 12 months       Assessment/Plan     3rd degree AV heart block, Bradycardia  -ECG: complete heart block without ST changes,   -BNP 2257, Troponins 34, 33  -TSH 1.88  -tele reviewed: 3rd degree heartblock with bradycardia HR 30s-50s  -ECHO  -ECG for any ACS symptoms  -Pt currently stable, no symptoms  -Pacemaker implant scheduled for 3/13 with Dr. Rodriguez  -NPO after midnight  -Avoid SA marisa blocking agents  -hold BP meds  -If becomes symptomatic, will need to have temporary pacer wire placed    HTN, HLP  Hold BP meds for now->will restart post PCM  Cont statin    BONILLA on CKD  Creatinine 1.53 on admit, now improved  Monitor    Critical Care time 60 minutes    CELI Rader    I was present with the PA student who participated in the documentation of this note. I have personally seen and re-examined the patient and performed the medical decision-making components (assessment and plan of care). I have reviewed the PA student documentation and verified the findings in the note as written with additions or exceptions as stated in the body of this note.    Sadie Moody,  APRN-CNP

## 2024-03-13 ENCOUNTER — APPOINTMENT (OUTPATIENT)
Dept: CARDIOLOGY | Facility: HOSPITAL | Age: 84
DRG: 243 | End: 2024-03-13
Payer: MEDICARE

## 2024-03-13 ENCOUNTER — APPOINTMENT (OUTPATIENT)
Dept: RADIOLOGY | Facility: HOSPITAL | Age: 84
DRG: 243 | End: 2024-03-13
Payer: MEDICARE

## 2024-03-13 LAB
ALBUMIN SERPL BCP-MCNC: 3.2 G/DL (ref 3.4–5)
ANION GAP SERPL CALC-SCNC: 12 MMOL/L (ref 10–20)
BUN SERPL-MCNC: 44 MG/DL (ref 6–23)
CALCIUM SERPL-MCNC: 8.6 MG/DL (ref 8.6–10.3)
CHLORIDE SERPL-SCNC: 111 MMOL/L (ref 98–107)
CO2 SERPL-SCNC: 22 MMOL/L (ref 21–32)
CREAT SERPL-MCNC: 1.26 MG/DL (ref 0.5–1.05)
EGFRCR SERPLBLD CKD-EPI 2021: 42 ML/MIN/1.73M*2
ERYTHROCYTE [DISTWIDTH] IN BLOOD BY AUTOMATED COUNT: 15.2 % (ref 11.5–14.5)
GLUCOSE SERPL-MCNC: 99 MG/DL (ref 74–99)
HCT VFR BLD AUTO: 32.2 % (ref 36–46)
HGB BLD-MCNC: 10 G/DL (ref 12–16)
MAGNESIUM SERPL-MCNC: 1.68 MG/DL (ref 1.6–2.4)
MCH RBC QN AUTO: 31.2 PG (ref 26–34)
MCHC RBC AUTO-ENTMCNC: 31.1 G/DL (ref 32–36)
MCV RBC AUTO: 100 FL (ref 80–100)
PHOSPHATE SERPL-MCNC: 3.1 MG/DL (ref 2.5–4.9)
PLATELET # BLD AUTO: 167 X10*3/UL (ref 150–450)
PMV BLD AUTO: 13 FL (ref 7.5–11.5)
POTASSIUM SERPL-SCNC: 4.2 MMOL/L (ref 3.5–5.3)
RBC # BLD AUTO: 3.21 X10*6/UL (ref 4–5.2)
SODIUM SERPL-SCNC: 141 MMOL/L (ref 136–145)
WBC # BLD AUTO: 8.3 X10*3/UL (ref 4.4–11.3)

## 2024-03-13 PROCEDURE — C1892 INTRO/SHEATH,FIXED,PEEL-AWAY: HCPCS | Performed by: INTERNAL MEDICINE

## 2024-03-13 PROCEDURE — 71045 X-RAY EXAM CHEST 1 VIEW: CPT | Performed by: STUDENT IN AN ORGANIZED HEALTH CARE EDUCATION/TRAINING PROGRAM

## 2024-03-13 PROCEDURE — 7100000010 HC PHASE TWO TIME - EACH INCREMENTAL 1 MINUTE: Performed by: INTERNAL MEDICINE

## 2024-03-13 PROCEDURE — 2500000004 HC RX 250 GENERAL PHARMACY W/ HCPCS (ALT 636 FOR OP/ED)

## 2024-03-13 PROCEDURE — 02H63JZ INSERTION OF PACEMAKER LEAD INTO RIGHT ATRIUM, PERCUTANEOUS APPROACH: ICD-10-PCS | Performed by: INTERNAL MEDICINE

## 2024-03-13 PROCEDURE — 85027 COMPLETE CBC AUTOMATED: CPT

## 2024-03-13 PROCEDURE — 2720000007 HC OR 272 NO HCPCS: Performed by: INTERNAL MEDICINE

## 2024-03-13 PROCEDURE — 71045 X-RAY EXAM CHEST 1 VIEW: CPT

## 2024-03-13 PROCEDURE — 2500000005 HC RX 250 GENERAL PHARMACY W/O HCPCS: Performed by: INTERNAL MEDICINE

## 2024-03-13 PROCEDURE — C1785 PMKR, DUAL, RATE-RESP: HCPCS | Performed by: INTERNAL MEDICINE

## 2024-03-13 PROCEDURE — 99291 CRITICAL CARE FIRST HOUR: CPT | Performed by: INTERNAL MEDICINE

## 2024-03-13 PROCEDURE — 2500000001 HC RX 250 WO HCPCS SELF ADMINISTERED DRUGS (ALT 637 FOR MEDICARE OP): Performed by: INTERNAL MEDICINE

## 2024-03-13 PROCEDURE — 2500000001 HC RX 250 WO HCPCS SELF ADMINISTERED DRUGS (ALT 637 FOR MEDICARE OP)

## 2024-03-13 PROCEDURE — 80069 RENAL FUNCTION PANEL: CPT

## 2024-03-13 PROCEDURE — 36005 INJECTION EXT VENOGRAPHY: CPT | Performed by: INTERNAL MEDICINE

## 2024-03-13 PROCEDURE — 2750000001 HC OR 275 NO HCPCS: Performed by: INTERNAL MEDICINE

## 2024-03-13 PROCEDURE — 2020000001 HC ICU ROOM DAILY

## 2024-03-13 PROCEDURE — 2780000003 HC OR 278 NO HCPCS: Performed by: INTERNAL MEDICINE

## 2024-03-13 PROCEDURE — 7100000009 HC PHASE TWO TIME - INITIAL BASE CHARGE: Performed by: INTERNAL MEDICINE

## 2024-03-13 PROCEDURE — C1769 GUIDE WIRE: HCPCS | Performed by: INTERNAL MEDICINE

## 2024-03-13 PROCEDURE — 93010 ELECTROCARDIOGRAM REPORT: CPT | Performed by: INTERNAL MEDICINE

## 2024-03-13 PROCEDURE — 2500000004 HC RX 250 GENERAL PHARMACY W/ HCPCS (ALT 636 FOR OP/ED): Performed by: INTERNAL MEDICINE

## 2024-03-13 PROCEDURE — C1898 LEAD, PMKR, OTHER THAN TRANS: HCPCS | Performed by: INTERNAL MEDICINE

## 2024-03-13 PROCEDURE — 0JH606Z INSERTION OF PACEMAKER, DUAL CHAMBER INTO CHEST SUBCUTANEOUS TISSUE AND FASCIA, OPEN APPROACH: ICD-10-PCS | Performed by: INTERNAL MEDICINE

## 2024-03-13 PROCEDURE — 33208 INSRT HEART PM ATRIAL & VENT: CPT | Performed by: INTERNAL MEDICINE

## 2024-03-13 PROCEDURE — 83735 ASSAY OF MAGNESIUM: CPT

## 2024-03-13 PROCEDURE — 93005 ELECTROCARDIOGRAM TRACING: CPT

## 2024-03-13 PROCEDURE — 2550000001 HC RX 255 CONTRASTS: Performed by: INTERNAL MEDICINE

## 2024-03-13 PROCEDURE — 36415 COLL VENOUS BLD VENIPUNCTURE: CPT

## 2024-03-13 PROCEDURE — 02HK3JZ INSERTION OF PACEMAKER LEAD INTO RIGHT VENTRICLE, PERCUTANEOUS APPROACH: ICD-10-PCS | Performed by: INTERNAL MEDICINE

## 2024-03-13 DEVICE — LEAD, PACEMAKER, 52MM: Type: IMPLANTABLE DEVICE | Site: CHEST | Status: FUNCTIONAL

## 2024-03-13 DEVICE — IPG W1DR01 AZURE XT DR MRI WL USA BCP
Type: IMPLANTABLE DEVICE | Site: CHEST | Status: FUNCTIONAL
Brand: AZURE™ XT DR MRI SURESCAN™

## 2024-03-13 RX ORDER — ACETAMINOPHEN 325 MG/1
650 TABLET ORAL EVERY 4 HOURS PRN
Status: DISCONTINUED | OUTPATIENT
Start: 2024-03-13 | End: 2024-03-14 | Stop reason: HOSPADM

## 2024-03-13 RX ORDER — SODIUM CHLORIDE 9 MG/ML
INJECTION, SOLUTION INTRAVENOUS CONTINUOUS PRN
Status: DISCONTINUED | OUTPATIENT
Start: 2024-03-13 | End: 2024-03-13 | Stop reason: HOSPADM

## 2024-03-13 RX ORDER — VANCOMYCIN HYDROCHLORIDE 1 G/200ML
INJECTION, SOLUTION INTRAVENOUS CONTINUOUS PRN
Status: DISCONTINUED | OUTPATIENT
Start: 2024-03-13 | End: 2024-03-13 | Stop reason: HOSPADM

## 2024-03-13 RX ORDER — ACETAMINOPHEN 160 MG/5ML
650 SOLUTION ORAL EVERY 4 HOURS PRN
Status: DISCONTINUED | OUTPATIENT
Start: 2024-03-13 | End: 2024-03-14 | Stop reason: HOSPADM

## 2024-03-13 RX ORDER — ACETAMINOPHEN 650 MG/1
650 SUPPOSITORY RECTAL EVERY 4 HOURS PRN
Status: DISCONTINUED | OUTPATIENT
Start: 2024-03-13 | End: 2024-03-14 | Stop reason: HOSPADM

## 2024-03-13 RX ORDER — ONDANSETRON HYDROCHLORIDE 2 MG/ML
INJECTION, SOLUTION INTRAVENOUS AS NEEDED
Status: DISCONTINUED | OUTPATIENT
Start: 2024-03-13 | End: 2024-03-13 | Stop reason: HOSPADM

## 2024-03-13 RX ORDER — DOPAMINE HYDROCHLORIDE 160 MG/100ML
INJECTION, SOLUTION INTRAVENOUS CONTINUOUS PRN
Status: DISCONTINUED | OUTPATIENT
Start: 2024-03-13 | End: 2024-03-13 | Stop reason: HOSPADM

## 2024-03-13 RX ORDER — CEFAZOLIN SODIUM 1 G/50ML
1 SOLUTION INTRAVENOUS EVERY 8 HOURS
Status: DISCONTINUED | OUTPATIENT
Start: 2024-03-13 | End: 2024-03-14 | Stop reason: HOSPADM

## 2024-03-13 RX ORDER — VANCOMYCIN HYDROCHLORIDE 1 G/20ML
INJECTION, POWDER, LYOPHILIZED, FOR SOLUTION INTRAVENOUS AS NEEDED
Status: DISCONTINUED | OUTPATIENT
Start: 2024-03-13 | End: 2024-03-13 | Stop reason: HOSPADM

## 2024-03-13 RX ORDER — HEPARIN SODIUM 5000 [USP'U]/ML
5000 INJECTION, SOLUTION INTRAVENOUS; SUBCUTANEOUS EVERY 8 HOURS
Status: DISCONTINUED | OUTPATIENT
Start: 2024-03-13 | End: 2024-03-14 | Stop reason: HOSPADM

## 2024-03-13 RX ORDER — CARVEDILOL 3.12 MG/1
6.25 TABLET ORAL
Status: DISCONTINUED | OUTPATIENT
Start: 2024-03-13 | End: 2024-03-14 | Stop reason: HOSPADM

## 2024-03-13 RX ORDER — LIDOCAINE HYDROCHLORIDE 20 MG/ML
INJECTION, SOLUTION INFILTRATION; PERINEURAL AS NEEDED
Status: DISCONTINUED | OUTPATIENT
Start: 2024-03-13 | End: 2024-03-13 | Stop reason: HOSPADM

## 2024-03-13 RX ADMIN — CEFAZOLIN SODIUM 1 G: 1 INJECTION, SOLUTION INTRAVENOUS at 16:16

## 2024-03-13 RX ADMIN — ATORVASTATIN CALCIUM 20 MG: 20 TABLET, FILM COATED ORAL at 20:12

## 2024-03-13 RX ADMIN — FERROUS SULFATE TAB 325 MG (65 MG ELEMENTAL FE) 1 TABLET: 325 (65 FE) TAB at 15:28

## 2024-03-13 RX ADMIN — Medication 125 MCG: at 20:12

## 2024-03-13 RX ADMIN — CARVEDILOL 6.25 MG: 3.12 TABLET, FILM COATED ORAL at 20:11

## 2024-03-13 RX ADMIN — FOLIC ACID 1 MG: 1 TABLET ORAL at 15:28

## 2024-03-13 RX ADMIN — CEFAZOLIN SODIUM 1 G: 1 INJECTION, SOLUTION INTRAVENOUS at 22:59

## 2024-03-13 RX ADMIN — PYRIDOXINE HCL TAB 50 MG 25 MG: 50 TAB at 15:28

## 2024-03-13 RX ADMIN — LEVOTHYROXINE SODIUM 50 MCG: 50 TABLET ORAL at 15:28

## 2024-03-13 RX ADMIN — PANTOPRAZOLE SODIUM 40 MG: 40 TABLET, DELAYED RELEASE ORAL at 15:28

## 2024-03-13 RX ADMIN — ACETAMINOPHEN 650 MG: 325 TABLET ORAL at 20:26

## 2024-03-13 RX ADMIN — HEPARIN SODIUM 5000 UNITS: 5000 INJECTION INTRAVENOUS; SUBCUTANEOUS at 16:19

## 2024-03-13 RX ADMIN — ASPIRIN 81 MG: 81 TABLET, COATED ORAL at 20:12

## 2024-03-13 RX ADMIN — ALLOPURINOL 200 MG: 100 TABLET ORAL at 15:28

## 2024-03-13 SDOH — ECONOMIC STABILITY: HOUSING INSECURITY: IN THE LAST 12 MONTHS, HOW MANY PLACES HAVE YOU LIVED?: 1

## 2024-03-13 SDOH — ECONOMIC STABILITY: HOUSING INSECURITY
IN THE LAST 12 MONTHS, WAS THERE A TIME WHEN YOU DID NOT HAVE A STEADY PLACE TO SLEEP OR SLEPT IN A SHELTER (INCLUDING NOW)?: NO

## 2024-03-13 SDOH — ECONOMIC STABILITY: INCOME INSECURITY: IN THE LAST 12 MONTHS, WAS THERE A TIME WHEN YOU WERE NOT ABLE TO PAY THE MORTGAGE OR RENT ON TIME?: NO

## 2024-03-13 SDOH — ECONOMIC STABILITY: FOOD INSECURITY: WITHIN THE PAST 12 MONTHS, YOU WORRIED THAT YOUR FOOD WOULD RUN OUT BEFORE YOU GOT THE MONEY TO BUY MORE.: NEVER TRUE

## 2024-03-13 SDOH — ECONOMIC STABILITY: FOOD INSECURITY

## 2024-03-13 SDOH — ECONOMIC STABILITY: FOOD INSECURITY: WITHIN THE PAST 12 MONTHS, THE FOOD YOU BOUGHT JUST DIDN'T LAST AND YOU DIDN'T HAVE MONEY TO GET MORE.: NEVER TRUE

## 2024-03-13 SDOH — ECONOMIC STABILITY: FOOD INSECURITY: WITHIN THE PAST 12 MONTHS, YOU WORRIED THAT YOUR FOOD WOULD RUN OUT BEFORE YOU GOT MONEY TO BUY MORE.: NEVER TRUE

## 2024-03-13 SDOH — ECONOMIC STABILITY: TRANSPORTATION INSECURITY
IN THE PAST 12 MONTHS, HAS THE LACK OF TRANSPORTATION KEPT YOU FROM MEDICAL APPOINTMENTS OR FROM GETTING MEDICATIONS?: NO

## 2024-03-13 SDOH — ECONOMIC STABILITY: TRANSPORTATION INSECURITY: IN THE PAST 12 MONTHS, HAS LACK OF TRANSPORTATION KEPT YOU FROM MEDICAL APPOINTMENTS OR FROM GETTING MEDICATIONS?: NO

## 2024-03-13 SDOH — ECONOMIC STABILITY: HOUSING INSECURITY: IN THE LAST 12 MONTHS, WAS THERE A TIME WHEN YOU WERE NOT ABLE TO PAY THE MORTGAGE OR RENT ON TIME?: NO

## 2024-03-13 SDOH — ECONOMIC STABILITY: GENERAL

## 2024-03-13 SDOH — ECONOMIC STABILITY: FOOD INSECURITY: WITHIN THE PAST 12 MONTHS, THE FOOD YOU BOUGHT JUST DIDN’T LAST AND YOU DIDN’T HAVE MONEY TO GET MORE.: NEVER TRUE

## 2024-03-13 SDOH — ECONOMIC STABILITY: HOUSING INSECURITY: IN THE PAST 12 MONTHS HAS THE ELECTRIC, GAS, OIL, OR WATER COMPANY THREATENED TO SHUT OFF SERVICES IN YOUR HOME?: NO

## 2024-03-13 SDOH — ECONOMIC STABILITY: TRANSPORTATION INSECURITY
IN THE PAST 12 MONTHS, HAS LACK OF TRANSPORTATION KEPT YOU FROM MEETINGS, WORK, OR FROM GETTING THINGS NEEDED FOR DAILY LIVING?: NO

## 2024-03-13 SDOH — ECONOMIC STABILITY: TRANSPORTATION INSECURITY

## 2024-03-13 SDOH — ECONOMIC STABILITY: HOUSING INSECURITY

## 2024-03-13 ASSESSMENT — SOCIAL DETERMINANTS OF HEALTH (SDOH): IN THE PAST 12 MONTHS, HAS THE ELECTRIC, GAS, OIL, OR WATER COMPANY THREATENED TO SHUT OFF SERVICE IN YOUR HOME?: NO

## 2024-03-13 ASSESSMENT — PAIN - FUNCTIONAL ASSESSMENT
PAIN_FUNCTIONAL_ASSESSMENT: 0-10
PAIN_FUNCTIONAL_ASSESSMENT: 0-10

## 2024-03-13 ASSESSMENT — PAIN SCALES - GENERAL
PAINLEVEL_OUTOF10: 0 - NO PAIN
PAINLEVEL_OUTOF10: 4

## 2024-03-13 ASSESSMENT — ACTIVITIES OF DAILY LIVING (ADL): LACK_OF_TRANSPORTATION: NO

## 2024-03-13 NOTE — DOCUMENTATION CLARIFICATION NOTE
"    PATIENT:               MAITE ARAUZ  ACCT #:                  [id number]  MRN:                       [id number]  :                       1940  ADMIT DATE:       3/11/2024 12:06 PM  DISCH DATE:  RESPONDING PROVIDER #:        [id number]          PROVIDER RESPONSE TEXT:    Non MI troponin elevation    CDI QUERY TEXT:    [facility]_Abnormal Studies      Instruction:    Based on your assessment of the patient and the clinical information, please provide the requested documentation by clicking on the appropriate radio button and enter any additional information if prompted.    Question: Is there a diagnosis indicative of the lab values or image study    When answering this query, please exercise your independent professional judgment. The fact that a question is being asked, does not imply that any particular answer is desired or expected.    The patient's clinical indicators include:  Clinical Information: 84 y/o female with a BMI of 32.34 with a history of HTN, HLD, CKD 3, GOUT, GERD, EVGENY, and hypothyroidism presented with bradycardia.    Clinical Indicators:    Per ED note 3/11/24 Antolin \"Complete heart block\"    Per Labs 3/11/24 and 3/12/24  BNP 2,257  Troponins 34 and 33    Treatment: Cardiology consult, repeat labs, repeat vs, and supportive care    Risk Factors: 84 y/o presented with bradycardia and found to have heart block and elevated troponins  Options provided:  -- Acute myocardial injury  -- Chronic myocardial injury  -- Other - I will add my own diagnosis  -- Refer to Clinical Documentation Reviewer    Query created by: Uche Pa on 3/13/2024 1:13 PM      Electronically signed by:  ARACELI BECERRA-KAMRAN 3/13/2024 1:53 PM          "

## 2024-03-13 NOTE — PROGRESS NOTES
"Medical Critical Care Daily Progress Note     Lilly Parr is a 83 y.o. female on day 2 of admission presenting with Complete heart block (CMS/HCC).    Subjective   Appears comfortable this morning.  No acute complaints.  Understands that she will have a pacemaker placed later today.    Tolerated procedure well.  No dizziness/syncope.    Objective     Physical Exam  Vitals reviewed.   HENT:      Head: Normocephalic and atraumatic.   Eyes:      Extraocular Movements: Extraocular movements intact.      Conjunctiva/sclera: Conjunctivae normal.      Pupils: Pupils are equal, round, and reactive to light.   Cardiovascular:      Rate and Rhythm: Regular rhythm. Bradycardia present.      Heart sounds: Normal heart sounds. No murmur heard.     No gallop.      Comments: HR 30s   Pulmonary:      Effort: No respiratory distress.      Breath sounds: Normal breath sounds. No wheezing.   Abdominal:      General: Abdomen is flat. There is no distension.      Palpations: Abdomen is soft.      Tenderness: There is no abdominal tenderness.   Musculoskeletal:      Right lower leg: No edema.      Left lower leg: No edema.   Skin:     General: Skin is warm and dry.   Neurological:      Mental Status: She is alert. Mental status is at baseline.   Psychiatric:         Mood and Affect: Mood normal.         Behavior: Behavior normal.       Last Recorded Vitals  Blood pressure (!) 140/102, pulse 65, temperature 36.5 °C (97.7 °F), resp. rate 20, height 1.6 m (5' 3\"), weight 82.5 kg (181 lb 14.1 oz), SpO2 97 %.  Intake/Output last 3 Shifts:  I/O last 3 completed shifts:  In: 700 (8.5 mL/kg) [P.O.:700]  Out: 1360 (16.5 mL/kg) [Urine:1360 (0.5 mL/kg/hr)]  Weight: 82.5 kg     Relevant Results    Current Facility-Administered Medications:     allopurinol (Zyloprim) tablet 200 mg, 200 mg, oral, Daily, Maximo Ramirez DO, 200 mg at 03/13/24 1528    [Held by provider] amLODIPine (Norvasc) tablet 2.5 mg, 2.5 mg, oral, q AM, Maximo Ramirez DO    aspirin " EC tablet 81 mg, 81 mg, oral, q PM, Maximo Ramirez DO, 81 mg at 03/12/24 2046    atorvastatin (Lipitor) tablet 20 mg, 20 mg, oral, Nightly, Maximo Ramirez DO, 20 mg at 03/12/24 2046    ceFAZolin in dextrose (iso-os) (Ancef) IVPB 1 g, 1 g, intravenous, q8h, Michael Gomez MD    cholecalciferol (Vitamin D-3) capsule 125 mcg, 5,000 Units, oral, q PM, Maximo Ramirez DO, 125 mcg at 03/12/24 2046    ferrous sulfate (325 mg ferrous sulfate) tablet 1 tablet, 1 tablet, oral, Daily, Maximo Ramirez DO, 1 tablet at 03/13/24 1528    folic acid (Folvite) tablet 1 mg, 1 mg, oral, Daily with lunch, Maximo Ramirez DO, 1 mg at 03/13/24 1528    levothyroxine (Synthroid, Levoxyl) tablet 50 mcg, 50 mcg, oral, q AM, Maximo Ramirez DO, 50 mcg at 03/13/24 1528    [Held by provider] losartan (Cozaar) tablet 50 mg, 50 mg, oral, q AM, Maximo Ramirez DO, 50 mg at 03/12/24 0818    melatonin tablet 5 mg, 5 mg, oral, Nightly PRN, Luisa Bourgeois MD, 5 mg at 03/12/24 2046    pantoprazole (ProtoNix) EC tablet 40 mg, 40 mg, oral, Daily before breakfast, Maximo Ramirez DO, 40 mg at 03/13/24 1528    pyridoxine (Vitamin B-6) tablet 25 mg, 25 mg, oral, Daily with lunch, Maximo Ramirez DO, 25 mg at 03/13/24 1528      Assessment/Plan   Lilly Parr is a 83 y.o. female with a PMH of breast cancer, HTN, anemia, CKD, Gout, GERD, hypothyroidism HLD presents to [facility] ED with a chief complaint of fainting/bradycardia. Admitted to ICU for complete heart block.      Interval Events 3/12:  -pacemaker today - tolerated procedure well     NEURO/PSYCH  -no acute findings     Cardiovascular  S/p pacemaker placement  3rd degree heart block - resolved   -EKG - 3rd degree heart block, bradycardia  -BNP 2257  -cardiology consulted - pacemaker placed on 3/13 and tolerated procedure well  -cont Ancef 1g q8hrs  -avoid SA node blocking agents  -ECHO completed - EF 55-60%    Hypertension  -hold home losartan 50mg daily  -holding home amlodipine 2.5mg daily / carvedilol 25mg BID      HLD  -cont home atorvastatin 20mg daily, ASA 81mg daily     Respiratory  -no acute findings     GI  GERD  -cont protonix 40mg daily     Elevated LFTs - resolved  -ALT 41, AST 26  -neg hepatitis panel - 2020   -cont to monitor     Renal  CKD 3  -baseline Cr 1.3-1.4  -no electrolyte abnormalities  -cont to monitor     Gout  -cont home allopurinol 200mg daily     Heme/Onc  Anemia of chronic disease  -cont with home B6/B12 / iron supplement     Hx of stage I breast cancer  -s/p 5yr course of Arimidex     Endo  Hypothyroidism  -cont home levothyroxine 50mcg     Rheum  -no acute issues     MSK/Skin  Hx of cutaneous candidiasis   -previously used nystatin under breast - no longer needed     Fluids: PRN  Electrolytes: Will replace as needed   Nutrition: Reg diet   GI Ppx: Protonix  DVT Ppx: Heparin subcutaneous - restarted  Code Status: FULL  Abx: None  Lines: 2x PIV  Emergency Contact: tha Guzman [phone] or  Hemal [phone]     Disposition. Admitted to ICU for 3rd degree heart block. Cardiology following. Plan for discharge tomorrow and follow up with cardiology.     Maximo Ramirez, DO  Internal Medicine, PGY-I

## 2024-03-13 NOTE — PROGRESS NOTES
Lilly Parr is a 83 y.o. female on day 2 of admission presenting with Complete heart block (CMS/HCC).      Subjective   She is sitting up in the bed, no complaints, states she slept well last night.       Review of systems:  Constitutional: negative for fever, chills, or malaise  Neuro: negative for dizziness, headache, numbness, tingling  ENT: Negative for nasal congestion or sore throat  Resp: negative for shortness of breath, cough, or wheezing  CV: negative for chest pain, palpitations  GI: negative for abd pain, nausea, vomiting or diarrhea  : negative for dysuria, frequency, or urgency  Skin: negative for lesions, wounds, or rash  Musculoskeletal: Negative for weakness, myalgia, or arthralgia  Endocrine: Negative for polyuria or polydipsia         Objective   Constitutional: Well developed, awake/alert/oriented x3, no distress, alert and cooperative  Eyes: PERRL, EOMI, clear sclera  ENMT: mucous membranes moist, no apparent injury, no lesions seen  Head/Neck: Neck supple, no apparent injury, thyroid without mass or tenderness, No JVD, trachea midline, no bruits  Respiratory/Thorax: Patent airways, CTAB, normal breath sounds with good chest expansion, thorax symmetric  Cardiovascular: Bradycardic, irregular rhythm, no murmurs, 2+ equal pulses of the extremities, normal S 1and S 2  Gastrointestinal: Nondistended, soft, non-tender, no rebound tenderness or guarding, no masses palpable, no organomegaly, +BS, no bruits  Musculoskeletal: ROM intact, no joint swelling, normal strength  Extremities: normal extremities, no cyanosis edema, contusions or wounds, no clubbing  Neurological: alert and oriented x3, intact senses, motor, response and reflexes, normal strength  Lymphatic: No significant lymphadenopathy  Psychological: Appropriate mood and behavior  Skin: Warm and dry, no lesions, no rashes      Last Recorded Vitals  /50   Pulse (!) 29   Temp 36.5 °C (97.7 °F) (Temporal)   Resp 13   Ht 1.6 m  "(5' 3\")   Wt 82.5 kg (181 lb 14.1 oz)   SpO2 98%   BMI 32.22 kg/m²     Intake/Output last 3 Shifts:  I/O last 3 completed shifts:  In: 700 (8.5 mL/kg) [P.O.:700]  Out: 1360 (16.5 mL/kg) [Urine:1360 (0.5 mL/kg/hr)]  Weight: 82.5 kg   I/O this shift:  In: 0   Out: 100 [Urine:100]    Relevant Results  Scheduled medications  allopurinol, 200 mg, oral, Daily  [Held by provider] amLODIPine, 2.5 mg, oral, q AM  aspirin, 81 mg, oral, q PM  atorvastatin, 20 mg, oral, Nightly  cholecalciferol, 5,000 Units, oral, q PM  ferrous sulfate (325 mg ferrous sulfate), 1 tablet, oral, Daily  folic acid, 1 mg, oral, Daily with lunch  levothyroxine, 50 mcg, oral, q AM  [Held by provider] losartan, 50 mg, oral, q AM  pantoprazole, 40 mg, oral, Daily before breakfast  pyridoxine, 25 mg, oral, Daily with lunch      Continuous medications  DOPamine, , Last Rate: 5 mcg/kg/min (03/13/24 1340)  oxygen, , Last Rate: 2 L/min (03/13/24 1315)  sodium chloride, , Last Rate: 250 mL (03/13/24 1344)  sodium chloride 0.9%, , Last Rate: 50 mL/hr (03/13/24 1313)  vancomycin,       PRN medications  PRN medications: DOPamine, iohexol, lidocaine, melatonin, ondansetron, oxygen, sodium chloride, sodium chloride 0.9%, vancomycin, vancomycin    Results for orders placed or performed during the hospital encounter of 03/11/24 (from the past 24 hour(s))   CBC   Result Value Ref Range    WBC 8.3 4.4 - 11.3 x10*3/uL    RBC 3.21 (L) 4.00 - 5.20 x10*6/uL    Hemoglobin 10.0 (L) 12.0 - 16.0 g/dL    Hematocrit 32.2 (L) 36.0 - 46.0 %     80 - 100 fL    MCH 31.2 26.0 - 34.0 pg    MCHC 31.1 (L) 32.0 - 36.0 g/dL    RDW 15.2 (H) 11.5 - 14.5 %    Platelets 167 150 - 450 x10*3/uL    MPV 13.0 (H) 7.5 - 11.5 fL   Renal Function Panel   Result Value Ref Range    Glucose 99 74 - 99 mg/dL    Sodium 141 136 - 145 mmol/L    Potassium 4.2 3.5 - 5.3 mmol/L    Chloride 111 (H) 98 - 107 mmol/L    Bicarbonate 22 21 - 32 mmol/L    Anion Gap 12 10 - 20 mmol/L    Urea Nitrogen 44 " (H) 6 - 23 mg/dL    Creatinine 1.26 (H) 0.50 - 1.05 mg/dL    eGFR 42 (L) >60 mL/min/1.73m*2    Calcium 8.6 8.6 - 10.3 mg/dL    Phosphorus 3.1 2.5 - 4.9 mg/dL    Albumin 3.2 (L) 3.4 - 5.0 g/dL   Magnesium   Result Value Ref Range    Magnesium 1.68 1.60 - 2.40 mg/dL       Transthoracic Echo (TTE) Complete   Final Result      XR chest 1 view   Final Result   Mild cardiomegaly.  Currently without radiographic evidence of CHF or   pneumonia.        Large stable right-sided hiatal hernia.        MACRO:   None        Signed by: Renny Pedraza 3/11/2024 1:23 PM   Dictation workstation:   [id number]      Electrophysiology procedure    (Results Pending)       Transthoracic Echo (TTE) Complete    Result Date: 3/12/2024   [facility], [address] [place] [address]               Tel [phone] and Fax [phone] TRANSTHORACIC ECHOCARDIOGRAM REPORT  Patient Name:      MAITE Cobb Physician:    [id number] Edwin Norwood MD Study Date:        3/12/2024            Ordering Provider:    [id number] RENNY KELLER MRN/PID:           [id number]             Fellow: Accession#:        [id number]         Nurse:                Mary Tejada RN Date of Birth/Age: 1940 / 83 years  Sonographer:          Itzel Coe RDCS Gender:            F                    Additional Staff: Height:            160.02 cm            Admit Date:           3/11/2024 Weight:            80.29 kg             Admission Status:     Inpatient - Time                                                               Critical BSA / BMI:         1.84 m2 / 31.35      Encounter#:           [id number]                    kg/m2                                         [facility]                                                               Invasive Blood Pressure: 120 /85 mmHg Study Type:    TRANSTHORACIC ECHO (TTE) COMPLETE Diagnosis/ICD: Atrioventricular block, complete-I44.2 Indication:    Complete Heart Block CPT Code:      Echo Complete w Full [id number] Patient History: Pertinent History: Dizzy, H&H, CKD, Syncope. Study Detail: The following Echo studies were performed: 2D, M-Mode, Doppler and               color flow. Technically challenging study due to patient lying in               supine position and body habitus. Definity used as a contrast               agent for endocardial border definition. Total contrast used for               this procedure was 2.0 mL via IV push. Unable to obtain LA, RA, RV               view. The patient was awake.  PHYSICIAN INTERPRETATION: Left Ventricle: The left ventricular systolic function is normal, with an estimated ejection fraction of 55-60%. There are no regional wall motion abnormalities. The left ventricular cavity size is normal. Left ventricular diastolic filling was indeterminate. Left Atrium: The left atrium is mildly dilated. Right Ventricle: The right ventricle is normal in size. There is normal right ventricular global systolic function. Right Atrium: The right atrium is normal in size. Aortic Valve: The aortic valve was not well visualized. Aortic valve regurgitation was not assessed. Mitral Valve: The mitral valve is normal in structure. There is mild mitral annular calcification. There is mild mitral valve regurgitation. Tricuspid Valve: The tricuspid valve is structurally normal. There is mild tricuspid regurgitation. Pulmonic Valve: The pulmonic valve is not well visualized. The pulmonic valve regurgitation was not well visualized. Pericardium: A pericardial effusion was not well visualized. Aorta: The aortic root was not well visualized. Pulmonary Artery: The tricuspid regurgitant velocity is 2.23 m/s, and  with an estimated right atrial pressure of 8 mmHg, the estimated pulmonary artery pressure is normal with the RVSP at 27.9 mmHg.  CONCLUSIONS:  1. Left ventricular systolic function is normal with a 55-60% estimated ejection fraction. QUANTITATIVE DATA SUMMARY: 2D MEASUREMENTS:                           Normal Ranges: IVSd:          1.13 cm    (0.6-1.1cm) LVPWd:         1.20 cm    (0.6-1.1cm) LVIDd:         4.74 cm    (3.9-5.9cm) LVIDs:         3.17 cm LV Mass Index: 112.2 g/m2 LV % FS        33.1 % AORTA MEASUREMENTS:                    Normal Ranges: Asc Ao, d: 3.30 cm (2.1-3.4cm) LV SYSTOLIC FUNCTION BY 2D PLANIMETRY (MOD):                     Normal Ranges: EF-A4C View: 66.2 % (>=55%) EF-A2C View: 65.6 % EF-Biplane:  64.5 % LV DIASTOLIC FUNCTION:                     Normal Ranges: MV Peak E: 1.57 m/s (0.7-1.2 m/s) MV Peak A: 1.57 m/s (0.42-0.7 m/s) E/A Ratio: 1.00     (1.0-2.2) MITRAL VALVE:                 Normal Ranges: MV DT: 657 msec (150-240msec) AORTIC VALVE:                        Normal Ranges: LVOT Diameter: 2.00 cm (1.8-2.4cm) TRICUSPID VALVE/RVSP:                             Normal Ranges: Peak TR Velocity: 2.23 m/s RV Syst Pressure: 27.9 mmHg (< 30mmHg) IVC Diam:         2.27 cm PULMONIC VALVE:                      Normal Ranges: PV Max Rigoberto: 1.0 m/s  (0.6-0.9m/s) PV Max PG:  3.7 mmHg  [id number] Edwin Norwood MD Electronically signed on 3/12/2024 at 11:40:24 AM  ** Final **           Assessment/Plan   Principal Problem:    Complete heart block (CMS/HCC)    3rd degree AV heart block, Bradycardia  -ECG: complete heart block without ST changes,   -BNP 2257, Troponins 34, 33  -TSH 1.88  -tele reviewed: 3rd degree heartblock with bradycardia HR 30s-50s  -ECHO as above   -ECG for any ACS symptoms  -Pt currently stable, no symptoms  -Pacemaker implant scheduled today with Dr. Rodriguez  -Avoid SA marisa blocking agents  -hold BP meds->restart post PCM     HTN, HLP  Hold BP meds for now->will restart post  PCM  Cont statin     BONILLA on CKD  Creatinine 1.53 on admit, now improved  Monitor        Sadie Moody, APRN-CNP

## 2024-03-13 NOTE — POST-PROCEDURE NOTE
Physician Transition of Care Summary  Invasive Cardiovascular Lab    Procedure Date: 3/13/2024  Attending:    Dari Gomez - Primary  Resident/Fellow/Other Assistant: Surgeon(s) and Role:    Indications:   Pre-op Diagnosis     * Complete heart block (CMS/HCC) [I44.2]    Post-procedure diagnosis:   Post-op Diagnosis     * Complete heart block (CMS/HCC) [I44.2]    Procedure(s):     * PPM IMPLANT DUAL      Procedure Findings:   DDD PACEMAKER PLACED    Description of the Procedure:   SEE REPORT    Complications:   NONE    Stents/Implants:   Cardiovascular Implants       Pacemaker    Lead, Pacemaker, 52mm - [id number] - [id number] - Implanted        Inventory item: LEAD, PACEMAKER, 52MM Model/Cat number: [id number]    Serial number: [id number] : [employer]    Lot number: [id number] Implant Date: 3/13/2024    Initial Device: Yes Temp Pacemaker: No      As of 3/13/2024       Status: Implanted                      Pacemaker, Dual Chamber, Jack Mri Xt Dr - [id number] - [id number] - Implanted        Inventory item: PACEMAKER, DUAL CHAMBER, JACK MRI XT DR Model/Cat number: [id number]    Serial number: [id number] : [employer]    Lot number: [id number] Device identifier: [id number]    Implant Date: 3/13/2024 Initial Device: Yes    Temp Pacemaker: No        As of 3/13/2024       Status: Implanted                              Anticoagulation/Antiplatelet Plan:   na    Estimated Blood Loss:   30 mL    Anesthesia: Moderate Sedation Anesthesia Staff: No anesthesia staff entered.    Any Specimen(s) Removed:   No specimens collected during this procedure.    Disposition:   home      Electronically signed by: Michael Gomez MD, 3/13/2024 2:37 PM

## 2024-03-13 NOTE — PROGRESS NOTES
03/13/24 1157   Discharge Planning   Living Arrangements Spouse/significant other   Support Systems Spouse/significant other   Assistance Needed Patient is from home with spouse, A&O X3, on room air at baseline, independent with ADLs and ambulates with a walker. Patient does not drive.   Type of Residence Private residence   Number of Stairs to Enter Residence 4   Number of Stairs Within Residence 0   Do you have animals or pets at home? No   Who is requesting discharge planning? Provider   Home or Post Acute Services In home services   Type of Home Care Services Home OT;Home PT;Home nursing visits   Patient expects to be discharged to: Home with new [facility]   Does the patient need discharge transport arranged? Yes   RoundTrip coordination needed? Yes   Has discharge transport been arranged? No

## 2024-03-14 ENCOUNTER — PHARMACY VISIT (OUTPATIENT)
Dept: PHARMACY | Facility: CLINIC | Age: 84
End: 2024-03-14
Payer: COMMERCIAL

## 2024-03-14 ENCOUNTER — APPOINTMENT (OUTPATIENT)
Dept: RADIOLOGY | Facility: HOSPITAL | Age: 84
End: 2024-03-14
Payer: MEDICARE

## 2024-03-14 ENCOUNTER — APPOINTMENT (OUTPATIENT)
Dept: RADIOLOGY | Facility: HOSPITAL | Age: 84
DRG: 243 | End: 2024-03-14
Payer: MEDICARE

## 2024-03-14 VITALS
SYSTOLIC BLOOD PRESSURE: 158 MMHG | RESPIRATION RATE: 20 BRPM | HEIGHT: 63 IN | BODY MASS INDEX: 32.23 KG/M2 | WEIGHT: 181.88 LBS | DIASTOLIC BLOOD PRESSURE: 89 MMHG | TEMPERATURE: 98.8 F | HEART RATE: 65 BPM | OXYGEN SATURATION: 95 %

## 2024-03-14 DIAGNOSIS — D64.9 ANEMIA, UNSPECIFIED TYPE: ICD-10-CM

## 2024-03-14 LAB
ALBUMIN SERPL BCP-MCNC: 3.2 G/DL (ref 3.4–5)
ANION GAP SERPL CALC-SCNC: 11 MMOL/L (ref 10–20)
BUN SERPL-MCNC: 39 MG/DL (ref 6–23)
CALCIUM SERPL-MCNC: 8.6 MG/DL (ref 8.6–10.3)
CHLORIDE SERPL-SCNC: 109 MMOL/L (ref 98–107)
CO2 SERPL-SCNC: 23 MMOL/L (ref 21–32)
CREAT SERPL-MCNC: 1.31 MG/DL (ref 0.5–1.05)
EGFRCR SERPLBLD CKD-EPI 2021: 40 ML/MIN/1.73M*2
ERYTHROCYTE [DISTWIDTH] IN BLOOD BY AUTOMATED COUNT: 15.3 % (ref 11.5–14.5)
GLUCOSE SERPL-MCNC: 96 MG/DL (ref 74–99)
HCT VFR BLD AUTO: 29.8 % (ref 36–46)
HGB BLD-MCNC: 9.4 G/DL (ref 12–16)
MAGNESIUM SERPL-MCNC: 1.67 MG/DL (ref 1.6–2.4)
MCH RBC QN AUTO: 31.3 PG (ref 26–34)
MCHC RBC AUTO-ENTMCNC: 31.5 G/DL (ref 32–36)
MCV RBC AUTO: 99 FL (ref 80–100)
NRBC BLD-RTO: 0 /100 WBCS (ref 0–0)
PHOSPHATE SERPL-MCNC: 3.5 MG/DL (ref 2.5–4.9)
PLATELET # BLD AUTO: 166 X10*3/UL (ref 150–450)
POTASSIUM SERPL-SCNC: 4.1 MMOL/L (ref 3.5–5.3)
RBC # BLD AUTO: 3 X10*6/UL (ref 4–5.2)
SODIUM SERPL-SCNC: 139 MMOL/L (ref 136–145)
WBC # BLD AUTO: 9 X10*3/UL (ref 4.4–11.3)

## 2024-03-14 PROCEDURE — 71046 X-RAY EXAM CHEST 2 VIEWS: CPT | Performed by: STUDENT IN AN ORGANIZED HEALTH CARE EDUCATION/TRAINING PROGRAM

## 2024-03-14 PROCEDURE — 99239 HOSP IP/OBS DSCHRG MGMT >30: CPT | Performed by: INTERNAL MEDICINE

## 2024-03-14 PROCEDURE — RXMED WILLOW AMBULATORY MEDICATION CHARGE

## 2024-03-14 PROCEDURE — 2500000001 HC RX 250 WO HCPCS SELF ADMINISTERED DRUGS (ALT 637 FOR MEDICARE OP)

## 2024-03-14 PROCEDURE — 36415 COLL VENOUS BLD VENIPUNCTURE: CPT

## 2024-03-14 PROCEDURE — 2500000004 HC RX 250 GENERAL PHARMACY W/ HCPCS (ALT 636 FOR OP/ED): Performed by: INTERNAL MEDICINE

## 2024-03-14 PROCEDURE — 80069 RENAL FUNCTION PANEL: CPT

## 2024-03-14 PROCEDURE — 85027 COMPLETE CBC AUTOMATED: CPT

## 2024-03-14 PROCEDURE — 2500000001 HC RX 250 WO HCPCS SELF ADMINISTERED DRUGS (ALT 637 FOR MEDICARE OP): Performed by: INTERNAL MEDICINE

## 2024-03-14 PROCEDURE — 97161 PT EVAL LOW COMPLEX 20 MIN: CPT | Mod: GP

## 2024-03-14 PROCEDURE — 83735 ASSAY OF MAGNESIUM: CPT

## 2024-03-14 PROCEDURE — 71046 X-RAY EXAM CHEST 2 VIEWS: CPT

## 2024-03-14 PROCEDURE — 2500000004 HC RX 250 GENERAL PHARMACY W/ HCPCS (ALT 636 FOR OP/ED)

## 2024-03-14 RX ORDER — FOLIC ACID 1 MG/1
1 TABLET ORAL
Qty: 90 TABLET | Refills: 1 | Status: SHIPPED | OUTPATIENT
Start: 2024-03-14

## 2024-03-14 RX ORDER — CARVEDILOL 6.25 MG/1
6.25 TABLET ORAL
Qty: 60 TABLET | Refills: 0 | Status: SHIPPED | OUTPATIENT
Start: 2024-03-14 | End: 2024-04-24

## 2024-03-14 RX ORDER — CEPHALEXIN 500 MG/1
500 CAPSULE ORAL 2 TIMES DAILY
Qty: 20 CAPSULE | Refills: 0 | Status: SHIPPED | OUTPATIENT
Start: 2024-03-14 | End: 2024-03-14 | Stop reason: SDUPTHER

## 2024-03-14 RX ORDER — CEPHALEXIN 500 MG/1
500 CAPSULE ORAL 2 TIMES DAILY
Qty: 20 CAPSULE | Refills: 0 | Status: SHIPPED | OUTPATIENT
Start: 2024-03-14 | End: 2024-03-24

## 2024-03-14 RX ADMIN — CEFAZOLIN SODIUM 1 G: 1 INJECTION, SOLUTION INTRAVENOUS at 07:56

## 2024-03-14 RX ADMIN — PANTOPRAZOLE SODIUM 40 MG: 40 TABLET, DELAYED RELEASE ORAL at 07:56

## 2024-03-14 RX ADMIN — CARVEDILOL 6.25 MG: 3.12 TABLET, FILM COATED ORAL at 07:55

## 2024-03-14 RX ADMIN — FERROUS SULFATE TAB 325 MG (65 MG ELEMENTAL FE) 1 TABLET: 325 (65 FE) TAB at 10:40

## 2024-03-14 RX ADMIN — LOSARTAN POTASSIUM 50 MG: 50 TABLET, FILM COATED ORAL at 10:40

## 2024-03-14 RX ADMIN — AMLODIPINE BESYLATE 2.5 MG: 5 TABLET ORAL at 10:40

## 2024-03-14 RX ADMIN — HEPARIN SODIUM 5000 UNITS: 5000 INJECTION INTRAVENOUS; SUBCUTANEOUS at 00:45

## 2024-03-14 RX ADMIN — PYRIDOXINE HCL TAB 50 MG 25 MG: 50 TAB at 10:44

## 2024-03-14 RX ADMIN — ALLOPURINOL 200 MG: 100 TABLET ORAL at 10:40

## 2024-03-14 RX ADMIN — HEPARIN SODIUM 5000 UNITS: 5000 INJECTION INTRAVENOUS; SUBCUTANEOUS at 07:55

## 2024-03-14 RX ADMIN — FOLIC ACID 1 MG: 1 TABLET ORAL at 10:44

## 2024-03-14 RX ADMIN — LEVOTHYROXINE SODIUM 50 MCG: 50 TABLET ORAL at 10:40

## 2024-03-14 ASSESSMENT — PAIN SCALES - GENERAL
PAINLEVEL_OUTOF10: 0 - NO PAIN

## 2024-03-14 ASSESSMENT — COGNITIVE AND FUNCTIONAL STATUS - GENERAL
WALKING IN HOSPITAL ROOM: A LITTLE
STANDING UP FROM CHAIR USING ARMS: A LITTLE
MOBILITY SCORE: 18
MOVING FROM LYING ON BACK TO SITTING ON SIDE OF FLAT BED WITH BEDRAILS: A LITTLE
TURNING FROM BACK TO SIDE WHILE IN FLAT BAD: A LITTLE
CLIMB 3 TO 5 STEPS WITH RAILING: A LITTLE
MOVING TO AND FROM BED TO CHAIR: A LITTLE

## 2024-03-14 ASSESSMENT — PAIN - FUNCTIONAL ASSESSMENT
PAIN_FUNCTIONAL_ASSESSMENT: 0-10
PAIN_FUNCTIONAL_ASSESSMENT: 0-10

## 2024-03-14 NOTE — PROGRESS NOTES
Physical Therapy    Physical Therapy Evaluation    Patient Name: Lilly Parr  MRN: [id number]  Today's Date: 3/14/2024   Time Calculation  Start Time: 0937  Stop Time: 0954  Time Calculation (min): 17 min    Assessment/Plan   PT Assessment  PT Assessment Results: Decreased strength  Rehab Prognosis: Excellent  Evaluation/Treatment Tolerance: Patient tolerated treatment well  Medical Staff Made Aware: Yes  Strengths: Ability to acquire knowledge  End of Session Communication: Bedside nurse  End of Session Patient Position: Up in chair, Alarm off, not on at start of session (RN is OK with Pt not having an alarm. Pt uses her call button appropriately)     PT Plan  PT Eval Only Reason: No acute PT needs identified  PT - OK to Discharge: Yes (LOW follow up services)      Subjective   General Visit Information:  General  Reason for Referral:  (84 yo female admitted 2' to co,plete heart block, fainting, bradycardia, pacemaker (3/13))  Referred By:  (Dr. LEILA Ramirez)  Past Medical History Relevant to Rehab:  (Breasr CA, anemia, CKD, L [place])  Prior to Session Communication: Bedside nurse  Patient Position Received: Up in chair, Alarm off, not on at start of session  General Comment:  (pt is pleasant and agreeable to work with PT. ICU monitoring)  Home Living:  Home Living  Type of Home: House  Lives With: Spouse  Home Adaptive Equipment: Walker rolling or standard, Cane (rollator)  Home Layout: Two level (Pt states tht she lives n the 1st floor)  Home Access: Stairs to enter with rails  Entrance Stairs-Rails: Both  Entrance Stairs-Number of Steps:  (4)  Bathroom Shower/Tub: Tub/shower unit  Bathroom Equipment: Grab bars in shower, Shower chair with back, Raised toilet seat with rails  Prior Level of Function:  Prior Function Per Pt/Caregiver Report  Level of [place]: Independent with ADLs and functional transfers, Independent with homemaking with ambulation (no device)  Ambulatory Assistance: Needs  assistance  Precautions:     Vital Signs:       Objective   Pain:  Pain Assessment  Pain Assessment: 0-10  Pain Score: 0 - No pain  Cognition:  Cognition  Overall Cognitive Status: Within Functional Limits    General Assessments:  General Observation  General Observation:  (Pt is moving well with wheeled walker based on Pt's current status, recommend Pt have LOW follow up services)               Activity Tolerance  Endurance: Tolerates 10 - 20 min exercise with multiple rests    Sensation  Light Touch: No apparent deficits    Strength  Strength Comments:  (L UE tested below 90 degrees 2' to new pacemaker B UE are grossly 4 of 5. B LE are grossly 4/4+ of 5)  Static Sitting Balance  Static Sitting-Balance Support: Bilateral upper extremity supported  Static Sitting-Level of Assistance: Distant supervision    Static Standing Balance  Static Standing-Balance Support: Bilateral upper extremity supported (wheeled walker)  Static Standing-Level of Assistance: Close supervision  Dynamic Standing Balance  Dynamic Standing-Balance Support: Bilateral upper extremity supported (wheeled walker)  Dynamic Standing-Comments:  (close supervision)  Functional Assessments:  Bed Mobility  Bed Mobility: No    Transfers  Transfer: Yes  Transfer 1  Transfer From 1: Chair with arms to  Transfer to 1: Stand  Technique 1: Sit to stand, Stand to sit  Transfer Device 1:  (wheeled walker)  Transfer Level of Assistance 1: Close supervision    Ambulation/Gait Training  Ambulation/Gait Training Performed: Yes  Ambulation/Gait Training 1  Surface 1: Level tile  Device 1: Rolling walker  Assistance 1: Close supervision  Quality of Gait 1: Decreased step length  Comments/Distance (ft) 1:  (10 feet forward/backward x 2)  Extremity/Trunk Assessments:     LUE   LUE: Within Functional Limits  RLE   RLE : Within Functional Limits     Outcome Measures:  [facility] Basic Mobility  Turning from your back to your side while in a flat bed without using bedrails: RAYMUNDO  little  Moving from lying on your back to sitting on the side of a flat bed without using bedrails: A little  Moving to and from bed to chair (including a wheelchair): A little  Standing up from a chair using your arms (e.g. wheelchair or bedside chair): A little  To walk in hospital room: A little  Climbing 3-5 steps with railing: A little  Basic Mobility - Total Score: 18        Education Documentation  No documentation found.  Education Comments  No comments found.

## 2024-03-14 NOTE — DISCHARGE SUMMARY
Discharge Diagnosis  Complete heart block (CMS/HCC)  Hypertension  Hyperlipidemia  Hiatal hernia  Hypothyroidism    Issues Requiring Follow-Up  Follow-up with cardiology - Dr. Rodriguez    Test Results Pending At Discharge  Pending Labs       No current pending labs.            Hospital Course  Lilly Parr is a 83 y.o. female with a PMH of breast cancer, HTN, anemia, CKD, Gout, GERD, hypothyroidism HLD presents to [facility] ED with a chief complaint of fainting/bradycardia. She had a prior episode around 2/27 where she felt flushed/hot and fainted. Also occurred again earlier last week with similar symptoms. Denies fever,chills, shortness of breath, sick contacts. She was referred to see cardiology (Dr. Rodriguez). Saw cardiology in office today who referred her to come to [facility] ED due to concerns of bradycardia.     In the ED, she was found to have 3rd degree heart block with HR in low 30s. Admitted and transferred to ICU. Cardiology consulted. Pacemaker placed on 3/13. Tolerated procedure well and cleared for discharge on 3/14.  Follow up with Dr. Rodriguez on 3/26 and sent home with Keflex 500mg BID for 10 days. Carvedilol changed to 6.25mg BID.     Pertinent Physical Exam At Time of Discharge  Physical Exam  Vitals reviewed.   HENT:      Head: Normocephalic and atraumatic.   Eyes:      Extraocular Movements: Extraocular movements intact.      Conjunctiva/sclera: Conjunctivae normal.      Pupils: Pupils are equal, round, and reactive to light.   Cardiovascular:      Rate and Rhythm: Normal rate and regular rhythm.      Heart sounds: Normal heart sounds. No murmur heard.     No gallop.      Comments: HR 70s  Pulmonary:      Effort: No respiratory distress.      Breath sounds: Normal breath sounds. No wheezing.   Abdominal:      General: Abdomen is flat. There is no distension.      Palpations: Abdomen is soft.      Tenderness: There is no abdominal tenderness.   Musculoskeletal:      Right lower leg: No edema.       Left lower leg: No edema.   Skin:     General: Skin is warm and dry.   Neurological:      Mental Status: She is alert. Mental status is at baseline.   Psychiatric:         Mood and Affect: Mood normal.         Behavior: Behavior normal.         Home Medications     Medication List      START taking these medications     cephalexin 500 mg capsule; Commonly known as: Keflex; Take 1 capsule   (500 mg) by mouth 2 times a day for 10 days.     CHANGE how you take these medications     allopurinol 100 mg tablet; Commonly known as: Zyloprim; Take 2 tablets   (200 mg) by mouth once daily.; What changed: how much to take, when to   take this   amLODIPine 2.5 mg tablet; Commonly known as: Norvasc; Take 1 tablet (2.5   mg) by mouth once daily.; What changed: when to take this   atorvastatin 20 mg tablet; Commonly known as: Lipitor; Take 1 tablet (20   mg) by mouth once daily.; What changed: when to take this   carvedilol 6.25 mg tablet; Commonly known as: Coreg; Take 1 tablet (6.25   mg) by mouth 2 times a day with meals.; What changed: medication strength,   how much to take   levothyroxine 50 mcg tablet; Commonly known as: Synthroid, Levoxyl; Take   1 tablet (50 mcg) by mouth once daily.; What changed: when to take this   losartan 100 mg tablet; Commonly known as: Cozaar; Take 0.5 tablets (50   mg) by mouth once daily.; What changed: when to take this   omeprazole 20 mg DR capsule; Commonly known as: PriLOSEC; Take 1 capsule   (20 mg) by mouth once daily in the morning. Take before meals. Before   eating; What changed: when to take this     CONTINUE taking these medications     aspirin 81 mg EC tablet   cholecalciferol 125 MCG (5000 UT) capsule; Commonly known as: Vitamin   D-3   cyanocobalamin (vitamin B-12) 5,000 mcg tablet, sublingual   ferrous sulfate (325 mg ferrous sulfate) tablet; Commonly known as:   FeroSuL; Take 1 tablet by mouth 2 times a day.   folic acid 1 mg tablet; Commonly known as: Folvite; Take 1 tablet (1  mg)   by mouth once daily at noon. Take with meals.   Vitamin B-6 25 mg tablet; Generic drug: pyridoxine     ASK your doctor about these medications     nystatin 100,000 unit/gram powder; Commonly known as: Mycostatin; Apply   topically 3 times a day.       Outpatient Follow-Up  Future Appointments   Date Time Provider Department Center   5/7/2024  8:15 AM Jarvis Fuentes MD [id number] [facility]   5/13/2024 10:30 AM MARIAM [facility] [id number] [facility]   11/11/2024  1:00 PM Mari Pierre MD [id number] [facility]       Maximo Ramirez DO

## 2024-03-14 NOTE — CARE PLAN
The patient's goals for the shift include      The clinical goals for the shift include pt will remain hds throughout the shift    Over the shift, the patient did not make progress toward the following goals. Barriers to progression include . Recommendations to address these barriers include .

## 2024-03-14 NOTE — PROGRESS NOTES
03/14/24 1453   Discharge Planning   Living Arrangements Spouse/significant other   Support Systems Spouse/significant other   Assistance Needed Patient is from home with spouse, A&O X3, on room air at baseline, independent with ADLs and ambulates with a walker. Patient does not drive.   Type of Residence Private residence   Number of Stairs to Enter Residence 4   Number of Stairs Within Residence 0   Do you have animals or pets at home? No   Who is requesting discharge planning? Provider   Home or Post Acute Services None   Patient expects to be discharged to: Patient declined C at time of discharge.   Does the patient need discharge transport arranged? No   RoundTrip coordination needed? No   Has discharge transport been arranged? No

## 2024-03-14 NOTE — PROGRESS NOTES
Lilly Parr is a 83 y.o. female on day 3 of admission presenting with Complete heart block (CMS/HCC).      Subjective   She is sitting up in the chair, states she feels good today, wants to go home.       Review of systems:  Constitutional: negative for fever, chills, or malaise  Neuro: negative for dizziness, headache, numbness, tingling  ENT: Negative for nasal congestion or sore throat  Resp: negative for shortness of breath, cough, or wheezing  CV: negative for chest pain, palpitations  GI: negative for abd pain, nausea, vomiting or diarrhea  : negative for dysuria, frequency, or urgency  Skin: negative for lesions, wounds, or rash  Musculoskeletal: Negative for weakness, myalgia, or arthralgia  Endocrine: Negative for polyuria or polydipsia         Objective   Constitutional: Well developed, awake/alert/oriented x3, no distress, alert and cooperative  Eyes: PERRL, EOMI, clear sclera  ENMT: mucous membranes moist, no apparent injury, no lesions seen  Head/Neck: Neck supple, no apparent injury, thyroid without mass or tenderness, No JVD, trachea midline, no bruits  Respiratory/Thorax: Patent airways, CTAB, normal breath sounds with good chest expansion, thorax symmetric  Cardiovascular: Bradycardic, irregular rhythm, no murmurs, 2+ equal pulses of the extremities, normal S 1and S 2  Gastrointestinal: Nondistended, soft, non-tender, no rebound tenderness or guarding, no masses palpable, no organomegaly, +BS, no bruits  Musculoskeletal: ROM intact, no joint swelling, normal strength  Extremities: normal extremities, no cyanosis edema, contusions or wounds, no clubbing  Neurological: alert and oriented x3, intact senses, motor, response and reflexes, normal strength  Lymphatic: No significant lymphadenopathy  Psychological: Appropriate mood and behavior  Skin: Warm and dry, dressing to left upper chest dry and intact      Last Recorded Vitals  /89   Pulse 65   Temp 37.1 °C (98.8 °F) (Temporal)    "Resp 20   Ht 1.6 m (5' 3\")   Wt 82.5 kg (181 lb 14.1 oz)   SpO2 95%   BMI 32.22 kg/m²     Intake/Output last 3 Shifts:  I/O last 3 completed shifts:  In: 710 (8.6 mL/kg) [P.O.:560; IV Piggyback:150]  Out: 680 (8.2 mL/kg) [Urine:650 (0.2 mL/kg/hr); Blood:30]  Weight: 82.5 kg   I/O this shift:  In: 50 [IV Piggyback:50]  Out: -     Relevant Results  Scheduled medications  allopurinol, 200 mg, oral, Daily  amLODIPine, 2.5 mg, oral, q AM  aspirin, 81 mg, oral, q PM  atorvastatin, 20 mg, oral, Nightly  carvedilol, 6.25 mg, oral, BID with meals  ceFAZolin, 1 g, intravenous, q8h  cholecalciferol, 5,000 Units, oral, q PM  ferrous sulfate (325 mg ferrous sulfate), 1 tablet, oral, Daily  folic acid, 1 mg, oral, Daily with lunch  heparin (porcine), 5,000 Units, subcutaneous, q8h  levothyroxine, 50 mcg, oral, q AM  losartan, 50 mg, oral, q AM  pantoprazole, 40 mg, oral, Daily before breakfast  pyridoxine, 25 mg, oral, Daily with lunch      Continuous medications       PRN medications  PRN medications: acetaminophen **OR** acetaminophen **OR** acetaminophen, melatonin    Results for orders placed or performed during the hospital encounter of 03/11/24 (from the past 24 hour(s))   ECG 12 lead   Result Value Ref Range    Ventricular Rate 72 BPM    Atrial Rate 58 BPM    MT Interval 152 ms    QRS Duration 138 ms    QT Interval 504 ms    QTC Calculation(Bazett) 551 ms    R Axis 122 degrees    T Axis -41 degrees    QRS Count 12 beats    Q Onset 198 ms    T Offset 450 ms    QTC Fredericia 535 ms   CBC   Result Value Ref Range    WBC 9.0 4.4 - 11.3 x10*3/uL    nRBC 0.0 0.0 - 0.0 /100 WBCs    RBC 3.00 (L) 4.00 - 5.20 x10*6/uL    Hemoglobin 9.4 (L) 12.0 - 16.0 g/dL    Hematocrit 29.8 (L) 36.0 - 46.0 %    MCV 99 80 - 100 fL    MCH 31.3 26.0 - 34.0 pg    MCHC 31.5 (L) 32.0 - 36.0 g/dL    RDW 15.3 (H) 11.5 - 14.5 %    Platelets 166 150 - 450 x10*3/uL   Renal Function Panel   Result Value Ref Range    Glucose 96 74 - 99 mg/dL    Sodium 139 " 136 - 145 mmol/L    Potassium 4.1 3.5 - 5.3 mmol/L    Chloride 109 (H) 98 - 107 mmol/L    Bicarbonate 23 21 - 32 mmol/L    Anion Gap 11 10 - 20 mmol/L    Urea Nitrogen 39 (H) 6 - 23 mg/dL    Creatinine 1.31 (H) 0.50 - 1.05 mg/dL    eGFR 40 (L) >60 mL/min/1.73m*2    Calcium 8.6 8.6 - 10.3 mg/dL    Phosphorus 3.5 2.5 - 4.9 mg/dL    Albumin 3.2 (L) 3.4 - 5.0 g/dL   Magnesium   Result Value Ref Range    Magnesium 1.67 1.60 - 2.40 mg/dL       XR chest 2 views   Final Result   Left chest wall pacemaker, similar in position compared to prior   radiograph. No evidence of pneumothorax.             MACRO:   None        Signed by: Tia Lovett 3/14/2024 8:41 AM   Dictation workstation:   [id number]      XR chest 1 view   Final Result   1. Interval placement of left chest wall pacemaker without evidence   of pneumothorax.   2. Cardiomegaly with mild interstitial edema.                  MACRO:   None        Signed by: Tia Lovett 3/13/2024 6:20 PM   Dictation workstation:   [id number]      Electrophysiology procedure   Final Result      Transthoracic Echo (TTE) Complete   Final Result      XR chest 1 view   Final Result   Mild cardiomegaly.  Currently without radiographic evidence of CHF or   pneumonia.        Large stable right-sided hiatal hernia.        MACRO:   None        Signed by: Renny Pedraza 3/11/2024 1:23 PM   Dictation workstation:   [id number]          Transthoracic Echo (TTE) Complete    Result Date: 3/12/2024   [facility], [address] [place] [address]               Tel [phone] and Fax [phone] TRANSTHORACIC ECHOCARDIOGRAM REPORT  Patient Name:      MAITE ARAUZ    Reading Physician:    [id number] Edwin Norwood MD Study Date:        3/12/2024            Ordering Provider:    [id number] RENNY KELLER MRN/PID:           [id number]             Fellow:  Accession#:        [id number]         Nurse:                Mary Tejada RN Date of Birth/Age: 1940 / 83 years  Sonographer:          Itzel Coe RDCS Gender:            F                    Additional Staff: Height:            160.02 cm            Admit Date:           3/11/2024 Weight:            80.29 kg             Admission Status:     Inpatient - Time                                                               Critical BSA / BMI:         1.84 m2 / 31.35      Encounter#:           [id number]                    kg/m2                                         Department Location:  [facility] Non                                                               Invasive Blood Pressure: 120 /85 mmHg Study Type:    TRANSTHORACIC ECHO (TTE) COMPLETE Diagnosis/ICD: Atrioventricular block, complete-I44.2 Indication:    Complete Heart Block CPT Code:      Echo Complete w Full Doppler-[id number] Patient History: Pertinent History: Dizzy, H&H, CKD, Syncope. Study Detail: The following Echo studies were performed: 2D, M-Mode, Doppler and               color flow. Technically challenging study due to patient lying in               supine position and body habitus. Definity used as a contrast               agent for endocardial border definition. Total contrast used for               this procedure was 2.0 mL via IV push. Unable to obtain LA, RA, RV               view. The patient was awake.  PHYSICIAN INTERPRETATION: Left Ventricle: The left ventricular systolic function is normal, with an estimated ejection fraction of 55-60%. There are no regional wall motion abnormalities. The left ventricular cavity size is normal. Left ventricular diastolic filling was indeterminate. Left Atrium: The left atrium is mildly dilated. Right Ventricle: The right ventricle is normal in size. There is normal  right ventricular global systolic function. Right Atrium: The right atrium is normal in size. Aortic Valve: The aortic valve was not well visualized. Aortic valve regurgitation was not assessed. Mitral Valve: The mitral valve is normal in structure. There is mild mitral annular calcification. There is mild mitral valve regurgitation. Tricuspid Valve: The tricuspid valve is structurally normal. There is mild tricuspid regurgitation. Pulmonic Valve: The pulmonic valve is not well visualized. The pulmonic valve regurgitation was not well visualized. Pericardium: A pericardial effusion was not well visualized. Aorta: The aortic root was not well visualized. Pulmonary Artery: The tricuspid regurgitant velocity is 2.23 m/s, and with an estimated right atrial pressure of 8 mmHg, the estimated pulmonary artery pressure is normal with the RVSP at 27.9 mmHg.  CONCLUSIONS:  1. Left ventricular systolic function is normal with a 55-60% estimated ejection fraction. QUANTITATIVE DATA SUMMARY: 2D MEASUREMENTS:                           Normal Ranges: IVSd:          1.13 cm    (0.6-1.1cm) LVPWd:         1.20 cm    (0.6-1.1cm) LVIDd:         4.74 cm    (3.9-5.9cm) LVIDs:         3.17 cm LV Mass Index: 112.2 g/m2 LV % FS        33.1 % AORTA MEASUREMENTS:                    Normal Ranges: Asc Ao, d: 3.30 cm (2.1-3.4cm) LV SYSTOLIC FUNCTION BY 2D PLANIMETRY (MOD):                     Normal Ranges: EF-A4C View: 66.2 % (>=55%) EF-A2C View: 65.6 % EF-Biplane:  64.5 % LV DIASTOLIC FUNCTION:                     Normal Ranges: MV Peak E: 1.57 m/s (0.7-1.2 m/s) MV Peak A: 1.57 m/s (0.42-0.7 m/s) E/A Ratio: 1.00     (1.0-2.2) MITRAL VALVE:                 Normal Ranges: MV DT: 657 msec (150-240msec) AORTIC VALVE:                        Normal Ranges: LVOT Diameter: 2.00 cm (1.8-2.4cm) TRICUSPID VALVE/RVSP:                             Normal Ranges: Peak TR Velocity: 2.23 m/s RV Syst Pressure: 27.9 mmHg (< 30mmHg) IVC Diam:         2.27 cm  PULMONIC VALVE:                      Normal Ranges: PV Max Rigoberto: 1.0 m/s  (0.6-0.9m/s) PV Max PG:  3.7 mmHg  [id number] Edwin Norwood MD Electronically signed on 3/12/2024 at 11:40:24 AM  ** Final **           Assessment/Plan   Principal Problem:    Complete heart block (CMS/HCC)    3rd degree AV heart block, Bradycardia  S/p Medtronic pacemaker  -ECG: complete heart block without ST changes,   -BNP 2257, Troponins 34, 33  -TSH 1.88  -ECHO as above   -ECG for any ACS symptoms  -Post op CXR negative for pneumo  -Device check WNL  -Activity limitations and dressing care reviewed with her  -keflex 500mg BID x10 days  -Coreg restarted  -Follow up     HTN, HLP  Restart Coreg  Cont statin     BONILLA on CKD  Creatinine 1.53 on admit, now improved  Monitor    Ok to discharge from a cardiac standpoint, follow up as scheduled    MARIAM Hernandes-CNP

## 2024-03-14 NOTE — DISCHARGE INSTRUCTIONS
Follow up with your PCP in 7-14 days.  Follow up with cardiology - Dr. Rodriguez's office on 3/26/2024.   Take Keflex (cephalexin) 500mg twice a day for the next 10 days.  Coreg (carvedilol) has been changed to 6.25mg twice a day.     Please take your home medications as instructed prior to hospitalization.  No changes to your home medications have been made during your hospital stay.    Please follow-up with your primary care provider within 7-14 days from time of discharge. Please call your PCP's office to schedule an appointment. You may need to bring photo ID and insurance card to your appointment.     If you experience any worsening symptoms or any new acute concerns arise, please contact your primary care provider to discuss and possibly arrange an appointment. You need to call your doctor or return to the closest ED if you develop any new or concerning symptoms such as fevers, chills, chest pain, shortness of breath, diarrhea, and so forth.  If you cannot get in touch with your primary care provider or severe symptoms are present, please return to nearest emergency room/urgent care for evaluation and treatment.    activity as tolerated LIGHT activity one week, DO NOT lift left arm OVER HEAD level for THREE weeks.     May not shower until follow-up visit.     May not drive for 5 day(s).     No pushing, pulling, or lifting objects greater than 10 pounds for 1 week(s).    Home going Instructions After a Pacemaker Implant:    Incision:  Please keep your incision dry and DO NOT REMOVE original bandage for one week, If bleeding or more discomfort, call Dr. Rodriguez. Do not remove steri strips, they will fall off themselves.  After one week, you may wash the site with your usual soap and water.   Inspect your incision each day.  If you note increased redness, swelling, or drainage, or if you develop a fever, please call your pacemaker doctor immediately.    Pain:  It is normal for the area around the incision to be  tender for a few weeks following surgery.  Pain relievers such as Tylenol or Motrin are usually sufficient for pain relief.  The pain should get better each day, if it gets worse, please contact your pacemaker doctor.    Activity:  First four weeks after implant:  Avoid gross arm movement on the side of your new implant.  Do not raise or stretch your arm above shoulder level.  Do not  weights greater than 10 lbs.  Avoid activities such as golf or swimming for six weeks.  Try to use garments that button down the front.   Avoid pull over shirts.     ID Card:  It is important that you carry your pacemaker ID card with you at all times.  Inform all doctors and healthcare providers that you have a pacemaker.  Until the lead wires are fully healed in your heart, a prophylactic antibiotic may need to be given to you prior to procedures such as tooth extraction or deep cleaning.      Electromagnetic Interference:  Microwave ovens are safe to use.  Cellular telephones should be held to the ear that is opposite your pacemaker. If  you need an MRI procedures, notify the ordering doctor about your pacemaker. You should be prepared to show your pacemaker identification card to the security guards at metal detectors,  hand wand detector should be kept away from the pacemaker.  Read the patient booklet for more information.  Please keep your pacemaker doctor informed about changes in your incision or how you are feeling.    It is your responsibility to make and keep appointments.   After each visit on your way out, make an appointment for your next visit.  Please follow the recommendations of your doctor for the frequency of appointments.  Your good health is your personal responsibility.

## 2024-03-15 NOTE — SIGNIFICANT EVENT
Follow Up Phone Call    Outgoing phone call    Spoke to: Lilly Parr Relationship:self   Phone number: [phone]      Outcome: I left a message on answering machine   Chief Complaint   Patient presents with    Abnormal ECG          Diagnosis:Not applicable

## 2024-03-16 DIAGNOSIS — K21.9 GERD WITHOUT ESOPHAGITIS: ICD-10-CM

## 2024-03-18 RX ORDER — OMEPRAZOLE 20 MG/1
20 CAPSULE, DELAYED RELEASE ORAL NIGHTLY
Qty: 90 CAPSULE | Refills: 1 | Status: SHIPPED | OUTPATIENT
Start: 2024-03-18

## 2024-03-18 NOTE — TELEPHONE ENCOUNTER
Transition of Care    Inpatient facility: [facility]  Discharge diagnosis: Complete Heart Block  Discharged to: Home  Discharge date: 3/14/24  Initial Call date: 3/18/24  Spoke with patient/caregiver: Patient                                                                     Do you need assistance  visits prior to your PCP visit: No  Home health care ordered: No  Have you been contacted by home care and have a start of care date: No  Are you taking medications as prescribed at discharge: Yes    Referral to [facility] Pharmacist: No  Patient advised to bring all medications to PCP follow-up appointment.  Patient advised to follow discharge instructions until provider follow-up.  TCM visit date: 3/21/24  TCM provider visit with: Jarvis Fuentes MD

## 2024-03-18 NOTE — SIGNIFICANT EVENT
Follow Up Phone Call    Outgoing phone call    Spoke to: Lilly Parr Relationship:self   Phone number: [phone]      Outcome: contacted patient/ family   Chief Complaint   Patient presents with    Abnormal ECG          Diagnosis:Not applicable    States she is feeling better. States incision is free of redness, pain, swelling or drainage. Denies fever or chills. No further questions or concerns.

## 2024-03-19 ENCOUNTER — PATIENT OUTREACH (OUTPATIENT)
Dept: CARE COORDINATION | Facility: CLINIC | Age: 84
End: 2024-03-19
Payer: MEDICARE

## 2024-03-19 NOTE — PROGRESS NOTES
Outreach call to patient to support a smooth transition of care from recent admission. Patient admitted with complete heart block. Left voicemail for patient.   Patient to follow up with PCP on 3/21/2024.  Will continue to follow.       Kacie Vela RN   Nurse Care Manager   [facility] Department   [phone]

## 2024-03-21 ENCOUNTER — OFFICE VISIT (OUTPATIENT)
Dept: PRIMARY CARE | Facility: CLINIC | Age: 84
End: 2024-03-21
Payer: MEDICARE

## 2024-03-21 VITALS
TEMPERATURE: 96.6 F | OXYGEN SATURATION: 99 % | HEART RATE: 74 BPM | SYSTOLIC BLOOD PRESSURE: 130 MMHG | DIASTOLIC BLOOD PRESSURE: 80 MMHG

## 2024-03-21 DIAGNOSIS — E78.5 DYSLIPIDEMIA: ICD-10-CM

## 2024-03-21 DIAGNOSIS — Z95.0 STATUS POST PLACEMENT OF CARDIAC PACEMAKER: Primary | ICD-10-CM

## 2024-03-21 DIAGNOSIS — E78.00 HYPERCHOLESTEREMIA: ICD-10-CM

## 2024-03-21 DIAGNOSIS — E03.9 HYPOTHYROIDISM, UNSPECIFIED TYPE: ICD-10-CM

## 2024-03-21 DIAGNOSIS — D64.9 ANEMIA, UNSPECIFIED TYPE: ICD-10-CM

## 2024-03-21 DIAGNOSIS — I10 BENIGN ESSENTIAL HYPERTENSION: ICD-10-CM

## 2024-03-21 PROCEDURE — 1157F ADVNC CARE PLAN IN RCRD: CPT | Performed by: INTERNAL MEDICINE

## 2024-03-21 PROCEDURE — 99496 TRANSJ CARE MGMT HIGH F2F 7D: CPT | Performed by: INTERNAL MEDICINE

## 2024-03-21 PROCEDURE — 3079F DIAST BP 80-89 MM HG: CPT | Performed by: INTERNAL MEDICINE

## 2024-03-21 PROCEDURE — 3075F SYST BP GE 130 - 139MM HG: CPT | Performed by: INTERNAL MEDICINE

## 2024-03-21 PROCEDURE — 1036F TOBACCO NON-USER: CPT | Performed by: INTERNAL MEDICINE

## 2024-03-21 PROCEDURE — 1111F DSCHRG MED/CURRENT MED MERGE: CPT | Performed by: INTERNAL MEDICINE

## 2024-03-21 PROCEDURE — 1159F MED LIST DOCD IN RCRD: CPT | Performed by: INTERNAL MEDICINE

## 2024-03-21 PROCEDURE — 1160F RVW MEDS BY RX/DR IN RCRD: CPT | Performed by: INTERNAL MEDICINE

## 2024-03-21 RX ORDER — LEVOTHYROXINE SODIUM 50 UG/1
50 TABLET ORAL EVERY MORNING
Qty: 90 TABLET | Refills: 3 | Status: SHIPPED | OUTPATIENT
Start: 2024-03-21 | End: 2025-03-21

## 2024-03-21 RX ORDER — LOSARTAN POTASSIUM 50 MG/1
50 TABLET ORAL DAILY
Qty: 90 TABLET | Refills: 3 | Status: SHIPPED | OUTPATIENT
Start: 2024-03-21 | End: 2024-05-29 | Stop reason: SDUPTHER

## 2024-03-21 RX ORDER — AMLODIPINE BESYLATE 2.5 MG/1
2.5 TABLET ORAL EVERY MORNING
Qty: 90 TABLET | Refills: 3 | Status: SHIPPED | OUTPATIENT
Start: 2024-03-21 | End: 2024-04-16 | Stop reason: SDUPTHER

## 2024-03-21 RX ORDER — ATORVASTATIN CALCIUM 20 MG/1
20 TABLET, FILM COATED ORAL NIGHTLY
Qty: 90 TABLET | Refills: 3 | Status: SHIPPED | OUTPATIENT
Start: 2024-03-21 | End: 2025-03-21

## 2024-03-21 NOTE — PROGRESS NOTES
Subjective   Patient ID: Lilly Parr is a 83 y.o. female who presents for Hospital Follow-up (TCM).    Transition of care  Patient admission history and physical, hospital course, medications, verified and reviewed  Patient contacted after discharge, medications verified comes today for follow-up    Inpatient facility: [facility]  Discharge diagnosis: Complete Heart Block  Discharged to: Home  Discharge date: 3/14/24  Initial Call date: 3/18/24  Spoke with patient/caregiver: Patient                                                                      Do you need assistance  visits prior to your PCP visit: No  Home health care ordered: No  Have you been contacted by home care and have a start of care date: No  Are you taking medications as prescribed at discharge: Yes     Referral to [facility] Pharmacist: No  Patient advised to bring all medications to PCP follow-up appointment.  Patient advised to follow discharge instructions until provider follow-up.  TCM visit date: 3/21/24  TCM provider visit with: Jarvis Fuentes MD  Patient was high complexity came today for follow-up transition of care    Patient admitted to the hospital for symptomatic bradycardia transferred from the cardiology office to the ER admitted patient heart rate was in the 30s pacemaker was placed on March 13 tolerated procedure well discharged home with medication adjusted  -Hypertension now patient on carvedilol 6.25 mg twice a day controlled to continue with current medication  Continue amlodipine 2.5 mg daily losartan 50 mg daily  - Hypothyroid controlled continue levothyroxine 50 mcg's daily  - Status post pacemaker continue with antibiotic Keflex follow-up cardiology as recommended  -Reflux disease symptoms continue with omeprazole 20 mg daily  - Continue on aspirin vitamin D ferrous sulfate folic acid and vitamin B6  Reevaluate patient in 4 weeks  Dizziness etiology unclear  Obtain EKG today sinus rhythm need  "to obtain carotid ultrasound  MRI of the brain with and without contrast  - Refer patient to cardiology for further workup will monitor patient closely  - Obtain CBC BMP urine test today follow-up results closely for further recommendation  - CT results are abnormal discussed with patient finding lung disease with multiple lung nodules no change from previous scans need to follow-up in 1 year repeat CT chest in October 2024 no coughing no shortness of breath no weight loss  -Hypertension improved continue with current medication continue low-salt diet  -Chronic anemia continue with current vitamin B12 and vitamin B6 iron supplement anemia of chronic disease  - Patient declined bone marrow biopsy aware of risk and benefit continue monitor conservatively  --Chronic kidney disease, controlled continue with low-salt diet increase fluid intake no change continue monitoring conservatively patient scheduled to see nephrology again in September 2024  -Mammogram reviewed normal results follow-up in 1 year  - Need to proceed with bone density as recommended will arrange in 6 months  - Chronic gout controlled continue current medication  -  Hypercholesteremia continue with current medication continue low-carb diet.  -Breast cancer in remission continue on anastrozole no medication side effects  -Hypothyroid continue the levothyroxine , compensated.  Follow-up 4 weeks                        Review of Systems    Objective   No results found for: \"HGBA1C\"   /80   Pulse 74   Temp 35.9 °C (96.6 °F)   SpO2 99%     Physical Exam    Assessment/Plan   Lilly was seen today for hospital follow-up.  Diagnoses and all orders for this visit:  Status post placement of cardiac pacemaker (Primary)  Benign essential hypertension  -     amLODIPine (Norvasc) 2.5 mg tablet; Take 1 tablet (2.5 mg) by mouth once daily in the morning.  -     losartan (Cozaar) 50 mg tablet; Take 1 tablet (50 mg) by mouth once daily.  Dyslipidemia  -     " atorvastatin (Lipitor) 20 mg tablet; Take 1 tablet (20 mg) by mouth once daily at bedtime.  Hypothyroidism, unspecified type  -     levothyroxine (Synthroid, Levoxyl) 50 mcg tablet; Take 1 tablet (50 mcg) by mouth once daily in the morning.  Anemia, unspecified type  Hypercholesteremia  Other orders  -     Follow Up In Primary Care - Established; Future   Transition of care  Patient admission history and physical, hospital course, medications, verified and reviewed  Patient contacted after discharge, medications verified comes today for follow-up    Inpatient facility: [facility]  Discharge diagnosis: Complete Heart Block  Discharged to: Home  Discharge date: 3/14/24  Initial Call date: 3/18/24  Spoke with patient/caregiver: Patient                                                                      Do you need assistance  visits prior to your PCP visit: No  Home health care ordered: No  Have you been contacted by home care and have a start of care date: No  Are you taking medications as prescribed at discharge: Yes     Referral to [facility] Pharmacist: No  Patient advised to bring all medications to PCP follow-up appointment.  Patient advised to follow discharge instructions until provider follow-up.  TCM visit date: 3/21/24  TCM provider visit with: Jarvis Fuentes MD  Patient was high complexity came today for follow-up transition of care    Patient admitted to the hospital for symptomatic bradycardia transferred from the cardiology office to the ER admitted patient heart rate was in the 30s pacemaker was placed on March 13 tolerated procedure well discharged home with medication adjusted  -Hypertension now patient on carvedilol 6.25 mg twice a day controlled to continue with current medication  Continue amlodipine 2.5 mg daily losartan 50 mg daily  - Hypothyroid controlled continue levothyroxine 50 mcg's daily  - Status post pacemaker continue with antibiotic Keflex follow-up  cardiology as recommended  -Reflux disease symptoms continue with omeprazole 20 mg daily  - Continue on aspirin vitamin D ferrous sulfate folic acid and vitamin B6  Reevaluate patient in 4 weeks  Dizziness etiology unclear  Obtain EKG today sinus rhythm need to obtain carotid ultrasound  MRI of the brain with and without contrast  - Refer patient to cardiology for further workup will monitor patient closely  - Obtain CBC BMP urine test today follow-up results closely for further recommendation  - CT results are abnormal discussed with patient finding lung disease with multiple lung nodules no change from previous scans need to follow-up in 1 year repeat CT chest in October 2024 no coughing no shortness of breath no weight loss  -Hypertension improved continue with current medication continue low-salt diet  -Chronic anemia continue with current vitamin B12 and vitamin B6 iron supplement anemia of chronic disease  - Patient declined bone marrow biopsy aware of risk and benefit continue monitor conservatively  --Chronic kidney disease, controlled continue with low-salt diet increase fluid intake no change continue monitoring conservatively patient scheduled to see nephrology again in September 2024  -Mammogram reviewed normal results follow-up in 1 year  - Need to proceed with bone density as recommended will arrange in 6 months  - Chronic gout controlled continue current medication  -  Hypercholesteremia continue with current medication continue low-carb diet.  -Breast cancer in remission continue on anastrozole no medication side effects  -Hypothyroid continue the levothyroxine , compensated.  Follow-up 4 weeks

## 2024-03-21 NOTE — DOCUMENTATION CLARIFICATION NOTE
PATIENT:               MAITE ARAUZ  ACCT #:                  [id number]  MRN:                       [id number]  :                       1940  ADMIT DATE:       3/11/2024 12:06 PM  DISCH DATE:        3/14/2024 3:00 PM  RESPONDING PROVIDER #:        [id number]          PROVIDER RESPONSE TEXT:    CKD Stage IIIa    CDI QUERY TEXT:    UH_Conflicting Documentation          Instruction:    Based on your assessment of the patient and the clinical information, please provide the requested documentation by clicking on the appropriate radio button and enter any additional information if prompted.    Question: Based on your medical judgment, can you please clarify which of these conditions is the most clinically supported    When answering this query, please exercise your independent professional judgment. The fact that a question is being asked, does not imply that any particular answer is desired or expected.    The patient's clinical indicators include:  Clinical Information: There is conflicting documentation in the medical record which requires clarification.    -The diagnosis of CKD 3 is noted in the ED note 3/11/24 [place]    -The diagnosis of CKD stage V is also note in the ED note 3/11/24 [place]    Clinical Indicators:    Per Labs 3/11/24 through 3/14/24  EGFR 33, 47, 42, and 40      Treatment: Sodium Chloride 0.9 percent IV bolus and then infusion, repeat labs, and supportive care    Risk Factors: 82 y/o with a history of CKD  Options provided:  -- CKD Stage III  -- CKD Stage IIIa  -- CKD Stage IIIb  -- CKD stage V  -- Other - I will add my own diagnosis  -- Refer to Clinical Documentation Reviewer    Query created by: Uche Pa on 3/21/2024 1:53 PM      Electronically signed by:  JAMILA CAGE DO 3/21/2024 3:16 PM

## 2024-03-21 NOTE — DOCUMENTATION CLARIFICATION NOTE
PATIENT:               MAITE ARAUZ  ACCT #:                  [id number]  MRN:                       [id number]  :                       1940  ADMIT DATE:       3/11/2024 12:06 PM  DISCH DATE:        3/14/2024 3:00 PM  RESPONDING PROVIDER #:        [id number]          PROVIDER RESPONSE TEXT:    Acute Kidney Injury ruled in as evidenced by Increased of 0.3 in creatinine from prior creatinine with signifincant improvement in Cr in 2 days    CDI QUERY TEXT:    UH_CV BONILLA      Instruction:    Based on your assessment of the patient and the clinical information, please provide the requested documentation by clicking on the appropriate radio button and enter any additional information if prompted.    Question: Please clarify the diagnosis of Acute Kidney Injury    When answering this query, please exercise your independent professional judgment. The fact that a question is being asked, does not imply that any particular answer is desired or expected.    The patient's clinical indicators include:  Clinical Information: 82 y/o female with a BMI of 32.34 with a history of HTN, HLD, CKD 3, GOUT, GERD, EVGENY, and hypothyroidism presented with bradycardia.      Documented Diagnosis: BONILLA is noted in H and P [facility] 3/12/24    Clinical Indicators and Documentation:  -Vital Signs: Pulse between 38 and 71 and RR between 12 and 24  -Baseline Creatinine: Assumed t be a low of 1.07 on 11/3/23  -SCr lab values: Per Labs 3/11/24 through 3/14/24  Creatinine 1.53, 1.15, 1.26, and 1.31  -Urine Output: Only intermittently measured 3/12/24 was 500 mL and 3/13/24 100 mL  -Electrolyte lab values: Per Labs 3/11/24 through 3/14/24  Chloride 105, 111, 111, and 109  -Nephrology consult: NA    Treatment: Sodium Chloride 0.9 percent IV bolus and then infusion, repeat labs, and supportive care    Risk Factors: 82 y/o with a history of CKD  Options provided:  -- Acute Kidney Injury is ruled out  -- Acute Kidney Injury ruled in as evidenced by,  Please specify additional information below  -- Other - I will add my own diagnosis  -- Refer to Clinical Documentation Reviewer    Query created by: Uche Pa on 3/21/2024 1:45 PM      Electronically signed by:  JAMILA CAGE DO 3/21/2024 3:16 PM

## 2024-03-22 ENCOUNTER — PATIENT OUTREACH (OUTPATIENT)
Dept: CARE COORDINATION | Facility: CLINIC | Age: 84
End: 2024-03-22
Payer: MEDICARE

## 2024-03-22 NOTE — PROGRESS NOTES
Follow up on the PCP visit - outreach to patient. Followed up on 3/21/2024. Left voicemail.     Kacie Vela , RN   Nurse Care Manager   [facility] Department   [phone]

## 2024-03-29 NOTE — OP NOTE
PPM IMPLANT DUAL Operative Note     Date: 3/13/2024  OR Location: [facility] Cardiac Cath Lab    Name: Lilly Parr, : 1940, Age: 83 y.o., MRN: [id number], Sex: female    Diagnosis  Pre-op Diagnosis     * Complete heart block (CMS/HCC) [I44.2] Post-op Diagnosis     * Complete heart block (CMS/HCC) [I44.2]     Procedures    * PPM IMPLANT DUAL    Surgeons      * Michael Gomez - Primary    Resident/Fellow/Other Assistant:  Surgeon(s) and Role:    Procedure Summary  Anesthesia: Moderate Sedation  ASA: ASA status not filed in the log.  Anesthesia Staff: No anesthesia staff entered.  Estimated Blood Loss: 10 mL  Intra-op Medications:   Administrations occurring from 1240 to 1430 on 24:   Medication Name Total Dose   vancomycin (Vancocin) in dextrose 5% water 1 g   sodium chloride 0.9% infusion 48.96 mL   DOPamine (Intropin) 400 mg in dextrose 5% 250 mL (1.6 mg/mL) infusion (premix) 18.51 mg   oxygen (O2) therapy 113.27 L   lidocaine (Xylocaine) 20 mg/mL (2 %) injection 20 mL   sodium chloride 0.9 % bolus 443.72 mL   ondansetron (Zofran) injection 4 mg   vancomycin (Vancocin) vial for injection 1,000 mg   iohexol (OMNIPaque) 300 mg iodine/mL solution 10 mL         Intraprocedure I/O Totals       None           Specimen: No specimens collected     Staff:   Circulator: May Wolf RN  Scrub Person: Isreal Gastelum, RT         Drains and/or Catheters:   [REMOVED] External Urinary Catheter Female (Removed)   Wall Suction (mmHg) 40 24 2200   External Catheter Status In place 24   Securement Method Other (Comment) 24 220   Output (mL) 150 mL 24 0600       Tourniquet Times:         Implants:  Implants       Type Name Action Serial No.      Cardiac Pacemaker LEAD, PACEMAKER, 52MM - [id number] - [id number] Implanted [id number]     Cardiac Pacemaker PACEMAKER, DUAL CHAMBER, JACK MRI XT DR - [id number] - [id number] Implanted [id number]              Findings: P waves were measured at 2  mV the impedance was 455 ohms and the capture threshold was 2.5 V at 0.4 ms.  R waves were measured at 5.5 ms the impedance was 913 ohms and the capture threshold was 0.9 V at 0.5 ms.    At the end of the procedure P waves were 1.3 mV the impedance was 323 ohms.  The right ventricular impedance was 646 ohms and the capture threshold was 0.75 V at 0.4 ms.    Indications: Lilly Parr is an 83 y.o. female who is having surgery for Complete heart block (CMS/HCC) [I44.2].     The patient was seen in the preoperative area. The risks, benefits, complications, treatment options, non-operative alternatives, expected recovery and outcomes were discussed with the patient. The possibilities of reaction to medication, pulmonary aspiration, injury to surrounding structures, bleeding, recurrent infection, the need for additional procedures, failure to diagnose a condition, and creating a complication requiring transfusion or operation were discussed with the patient. The patient concurred with the proposed plan, giving informed consent.  The site of surgery was properly noted/marked if necessary per policy. The patient has been actively warmed in preoperative area. Preoperative antibiotics have been ordered and given within 1 hours of incision.   Procedure Details: Under local anesthesia, subclavian venogram was completed disclosed large caliber subclavian vein and large caliber cephalic vein.  Blunt dissection was done in the left deltopectoral groove and the cephalic vein was isolated and secured.  Venotomy was made and atrial and ventricular leads were directly introduced through the venotomy into the central circulation.  The leads were positioned in the right atrial appendage and right ventricular septum respectively.  Good capture and sensing thresholds were obtained.  The leads were connected to a pulse generator which was then placed in the prepectoral pocket fashioned by blunt dissection.  Hemostasis was achieved  the pocket was irrigated with antibiotic solution and closed in layers.  Complications:  None; patient tolerated the procedure well.    Disposition: PACU - hemodynamically stable.  Condition: stable         Attending Attestation: I was present and scrubbed for the entire procedure.    Michael Gomez  Phone Number: [phone]

## 2024-04-03 DIAGNOSIS — M10.9 GOUT, UNSPECIFIED CAUSE, UNSPECIFIED CHRONICITY, UNSPECIFIED SITE: ICD-10-CM

## 2024-04-03 DIAGNOSIS — I10 BENIGN ESSENTIAL HYPERTENSION: ICD-10-CM

## 2024-04-04 RX ORDER — ALLOPURINOL 100 MG/1
200 TABLET ORAL DAILY
Qty: 180 TABLET | Refills: 1 | Status: SHIPPED | OUTPATIENT
Start: 2024-04-04

## 2024-04-13 LAB
ATRIAL RATE: 58 BPM
PR INTERVAL: 152 MS
Q ONSET: 198 MS
QRS COUNT: 12 BEATS
QRS DURATION: 138 MS
QT INTERVAL: 504 MS
QTC CALCULATION(BAZETT): 551 MS
QTC FREDERICIA: 535 MS
R AXIS: 122 DEGREES
T AXIS: -41 DEGREES
T OFFSET: 450 MS
VENTRICULAR RATE: 72 BPM

## 2024-04-15 ENCOUNTER — PATIENT OUTREACH (OUTPATIENT)
Dept: CARE COORDINATION | Facility: CLINIC | Age: 84
End: 2024-04-15
Payer: MEDICARE

## 2024-04-15 NOTE — PROGRESS NOTES
Outreach call to patient to check in 30 days after hospital discharge to support smooth transition of care.  Left voicemail message for patient. Followed up with PCP.     Kacie Vela , RN   Nurse Care Manager   [facility]   [phone]

## 2024-04-16 RX ORDER — AMLODIPINE BESYLATE 2.5 MG/1
2.5 TABLET ORAL EVERY MORNING
Qty: 90 TABLET | Refills: 0 | Status: SHIPPED | OUTPATIENT
Start: 2024-04-16

## 2024-04-24 ENCOUNTER — OFFICE VISIT (OUTPATIENT)
Dept: PRIMARY CARE | Facility: CLINIC | Age: 84
End: 2024-04-24
Payer: MEDICARE

## 2024-04-24 VITALS
HEART RATE: 76 BPM | SYSTOLIC BLOOD PRESSURE: 136 MMHG | WEIGHT: 174.2 LBS | OXYGEN SATURATION: 98 % | TEMPERATURE: 97.4 F | BODY MASS INDEX: 30.86 KG/M2 | DIASTOLIC BLOOD PRESSURE: 80 MMHG

## 2024-04-24 DIAGNOSIS — I12.9 STAGE 3 CHRONIC KIDNEY DISEASE DUE TO BENIGN HYPERTENSION (MULTI): ICD-10-CM

## 2024-04-24 DIAGNOSIS — E78.00 HYPERCHOLESTEREMIA: ICD-10-CM

## 2024-04-24 DIAGNOSIS — Z95.0 STATUS POST PLACEMENT OF CARDIAC PACEMAKER: Primary | ICD-10-CM

## 2024-04-24 DIAGNOSIS — E03.9 HYPOTHYROIDISM, UNSPECIFIED TYPE: ICD-10-CM

## 2024-04-24 DIAGNOSIS — N18.30 STAGE 3 CHRONIC KIDNEY DISEASE DUE TO BENIGN HYPERTENSION (MULTI): ICD-10-CM

## 2024-04-24 DIAGNOSIS — I10 BENIGN ESSENTIAL HYPERTENSION: ICD-10-CM

## 2024-04-24 DIAGNOSIS — E78.5 DYSLIPIDEMIA: ICD-10-CM

## 2024-04-24 PROCEDURE — 99214 OFFICE O/P EST MOD 30 MIN: CPT | Performed by: INTERNAL MEDICINE

## 2024-04-24 PROCEDURE — 3079F DIAST BP 80-89 MM HG: CPT | Performed by: INTERNAL MEDICINE

## 2024-04-24 PROCEDURE — 1036F TOBACCO NON-USER: CPT | Performed by: INTERNAL MEDICINE

## 2024-04-24 PROCEDURE — 3075F SYST BP GE 130 - 139MM HG: CPT | Performed by: INTERNAL MEDICINE

## 2024-04-24 PROCEDURE — 1157F ADVNC CARE PLAN IN RCRD: CPT | Performed by: INTERNAL MEDICINE

## 2024-04-24 PROCEDURE — 1159F MED LIST DOCD IN RCRD: CPT | Performed by: INTERNAL MEDICINE

## 2024-04-24 PROCEDURE — 1160F RVW MEDS BY RX/DR IN RCRD: CPT | Performed by: INTERNAL MEDICINE

## 2024-04-24 ASSESSMENT — ENCOUNTER SYMPTOMS
BRUISES/BLEEDS EASILY: 0
DIARRHEA: 0
BLOOD IN STOOL: 0
UNEXPECTED WEIGHT CHANGE: 0
ARTHRALGIAS: 0
DIZZINESS: 0
FEVER: 0
HEADACHES: 0
PALPITATIONS: 0
WHEEZING: 0
COUGH: 0
FATIGUE: 0
SINUS PAIN: 0
SORE THROAT: 0
DIFFICULTY URINATING: 0
ABDOMINAL PAIN: 0

## 2024-04-24 NOTE — PROGRESS NOTES
Subjective   Patient ID: Lilly Parr is a 84 y.o. female who presents for one month follow  (After cardiac pacemaker).    Recent blood work reviewed magnesium level improved renal function stable  Counseled about avoiding any NSAIDs doing well  - Hypertension improved continue on carvedilol amlodipine and losartan  - Status post pacemaker placement doing well incision healing  Follow-up cardiology  -Reflux disease symptoms continue with omeprazole 20 mg daily  - Continue on aspirin vitamin D ferrous sulfate folic acid and vitamin B6  Reevaluate patient in 4 weeks  - CT results are abnormal discussed with patient finding lung disease with multiple lung nodules no change from previous scans need to follow-up in 1 year repeat CT chest in October 2024 no coughing no shortness of breath no weight loss  -Hypertension improved continue with current medication continue low-salt diet  -Chronic anemia continue with current vitamin B12 and vitamin B6 iron supplement anemia of chronic disease  - Patient declined bone marrow biopsy aware of risk and benefit continue monitor conservatively  --Chronic kidney disease, controlled continue with low-salt diet increase fluid intake no change continue monitoring conservatively patient scheduled to see nephrology again in September 2024  -Mammogram reviewed normal results follow-up in 1 year  - Need to proceed with bone density as recommended will arrange in 6 months  - Chronic gout controlled continue current medication  -  Hypercholesteremia continue with current medication continue low-carb diet.  -Breast cancer in remission continue on anastrozole no medication side effects  -Hypothyroid continue the levothyroxine , compensated.  Follow-up 3 months                Review of Systems   Constitutional:  Negative for fatigue, fever and unexpected weight change.   HENT:  Negative for congestion, ear discharge, ear pain, mouth sores, sinus pain and sore throat.    Eyes:  Negative for  "visual disturbance.   Respiratory:  Negative for cough and wheezing.    Cardiovascular:  Negative for chest pain, palpitations and leg swelling.   Gastrointestinal:  Negative for abdominal pain, blood in stool and diarrhea.   Genitourinary:  Negative for difficulty urinating.   Musculoskeletal:  Negative for arthralgias.   Skin:  Negative for rash.   Neurological:  Negative for dizziness and headaches.   Hematological:  Does not bruise/bleed easily.   Psychiatric/Behavioral:  Negative for behavioral problems.    All other systems reviewed and are negative.      Objective   No results found for: \"HGBA1C\"   /80   Pulse 76   Temp 36.3 °C (97.4 °F)   Wt 79 kg (174 lb 3.2 oz)   SpO2 98%   BMI 30.86 kg/m²     Physical Exam  Vitals and nursing note reviewed.   Constitutional:       Appearance: Normal appearance.   HENT:      Head: Normocephalic.      Nose: Nose normal.   Eyes:      Conjunctiva/sclera: Conjunctivae normal.      Pupils: Pupils are equal, round, and reactive to light.   Cardiovascular:      Rate and Rhythm: Regular rhythm.   Pulmonary:      Effort: Pulmonary effort is normal.      Breath sounds: Normal breath sounds.   Abdominal:      General: Abdomen is flat.      Palpations: Abdomen is soft.   Musculoskeletal:      Cervical back: Neck supple.   Skin:     General: Skin is warm.   Neurological:      General: No focal deficit present.      Mental Status: She is oriented to person, place, and time.   Psychiatric:         Mood and Affect: Mood normal.         Assessment/Plan   Lilly was seen today for one month follow .  Diagnoses and all orders for this visit:  Status post placement of cardiac pacemaker (Primary)  Dyslipidemia  Hypercholesteremia  Hypothyroidism, unspecified type  Benign essential hypertension  Stage 3 chronic kidney disease due to benign hypertension (Multi)  Other orders  -     Follow Up In Primary Care - Established  -     Follow Up In Primary Care - Established; Future   Recent " blood work reviewed magnesium level improved renal function stable  Counseled about avoiding any NSAIDs doing well  - Hypertension improved continue on carvedilol amlodipine and losartan  - Status post pacemaker placement doing well incision healing  Follow-up cardiology  -Reflux disease symptoms continue with omeprazole 20 mg daily  - Continue on aspirin vitamin D ferrous sulfate folic acid and vitamin B6  Reevaluate patient in 4 weeks  - CT results are abnormal discussed with patient finding lung disease with multiple lung nodules no change from previous scans need to follow-up in 1 year repeat CT chest in October 2024 no coughing no shortness of breath no weight loss  -Hypertension improved continue with current medication continue low-salt diet  -Chronic anemia continue with current vitamin B12 and vitamin B6 iron supplement anemia of chronic disease  - Patient declined bone marrow biopsy aware of risk and benefit continue monitor conservatively  --Chronic kidney disease, controlled continue with low-salt diet increase fluid intake no change continue monitoring conservatively patient scheduled to see nephrology again in September 2024  -Mammogram reviewed normal results follow-up in 1 year  - Need to proceed with bone density as recommended will arrange in 6 months  - Chronic gout controlled continue current medication  -  Hypercholesteremia continue with current medication continue low-carb diet.  -Breast cancer in remission continue on anastrozole no medication side effects  -Hypothyroid continue the levothyroxine , compensated.  Follow-up 3 months

## 2024-05-07 ENCOUNTER — APPOINTMENT (OUTPATIENT)
Dept: PRIMARY CARE | Facility: CLINIC | Age: 84
End: 2024-05-07
Payer: MEDICARE

## 2024-05-09 ENCOUNTER — TELEPHONE (OUTPATIENT)
Dept: HEMATOLOGY/ONCOLOGY | Facility: HOSPITAL | Age: 84
End: 2024-05-09
Payer: MEDICARE

## 2024-05-09 NOTE — TELEPHONE ENCOUNTER
LM for patient to have her blood work drawn prior to her visit with Denae Louis CNP on May, 13, 2024

## 2024-05-13 ENCOUNTER — APPOINTMENT (OUTPATIENT)
Dept: HEMATOLOGY/ONCOLOGY | Facility: HOSPITAL | Age: 84
End: 2024-05-13
Payer: MEDICARE

## 2024-05-13 ENCOUNTER — TELEPHONE (OUTPATIENT)
Dept: HEMATOLOGY/ONCOLOGY | Facility: HOSPITAL | Age: 84
End: 2024-05-13
Payer: MEDICARE

## 2024-05-13 DIAGNOSIS — C50.911 MALIGNANT NEOPLASM OF RIGHT FEMALE BREAST, UNSPECIFIED ESTROGEN RECEPTOR STATUS, UNSPECIFIED SITE OF BREAST (MULTI): Primary | ICD-10-CM

## 2024-05-13 DIAGNOSIS — D64.9 ANEMIA, UNSPECIFIED TYPE: ICD-10-CM

## 2024-05-13 DIAGNOSIS — E53.8 VITAMIN B12 DEFICIENCY: ICD-10-CM

## 2024-05-13 NOTE — TELEPHONE ENCOUNTER
Patient scheduled for HEM ONC ESTABLISHED PT NEW PHYSICIAN appointment with SUSIE Riley today at 10:30 AM. Received message from answering service that patient would like to reschedule appointment. Reached out to patient to reschedule. Patient requested next available appointment. Rescheduled to Monday, 6/3/24 at 1:30 PM. Patient verbalized understanding and agreed to appointment change.

## 2024-05-29 DIAGNOSIS — I10 BENIGN ESSENTIAL HYPERTENSION: ICD-10-CM

## 2024-05-29 RX ORDER — LOSARTAN POTASSIUM 50 MG/1
50 TABLET ORAL DAILY
Qty: 90 TABLET | Refills: 1 | Status: SHIPPED | OUTPATIENT
Start: 2024-05-29

## 2024-05-30 ENCOUNTER — TELEPHONE (OUTPATIENT)
Dept: HEMATOLOGY/ONCOLOGY | Facility: HOSPITAL | Age: 84
End: 2024-05-30
Payer: MEDICARE

## 2024-05-30 NOTE — TELEPHONE ENCOUNTER
Patient scheduled for Hem Onc Established Patient Visit with SUSIE Riley on Monday, 6/3/24 at 2 PM. Due to family emergency, provider is requesting for appointment to be changed to virtual visit or rescheduled. Reached out to patient to offer virtual visit or reschedule. Patient requested to reschedule appointment since this is her first time seeing Denae. Provider had an opening at 10:30 AM. Moved patient's appointment up to then. Patient verbalized understanding and agreed to time change of appointment.

## 2024-06-03 ENCOUNTER — HOSPITAL ENCOUNTER (EMERGENCY)
Facility: HOSPITAL | Age: 84
Discharge: HOME | End: 2024-06-03
Attending: EMERGENCY MEDICINE
Payer: MEDICARE

## 2024-06-03 ENCOUNTER — APPOINTMENT (OUTPATIENT)
Dept: HEMATOLOGY/ONCOLOGY | Facility: HOSPITAL | Age: 84
End: 2024-06-03
Payer: MEDICARE

## 2024-06-03 VITALS
HEART RATE: 72 BPM | OXYGEN SATURATION: 98 % | SYSTOLIC BLOOD PRESSURE: 176 MMHG | TEMPERATURE: 97.7 F | HEIGHT: 63 IN | DIASTOLIC BLOOD PRESSURE: 81 MMHG | RESPIRATION RATE: 17 BRPM | WEIGHT: 177 LBS | BODY MASS INDEX: 31.36 KG/M2

## 2024-06-03 DIAGNOSIS — R19.7 DIARRHEA, UNSPECIFIED TYPE: ICD-10-CM

## 2024-06-03 DIAGNOSIS — E53.8 VITAMIN B12 DEFICIENCY: ICD-10-CM

## 2024-06-03 DIAGNOSIS — E61.1 IRON DEFICIENCY: ICD-10-CM

## 2024-06-03 DIAGNOSIS — I12.9 STAGE 3 CHRONIC KIDNEY DISEASE DUE TO BENIGN HYPERTENSION (MULTI): ICD-10-CM

## 2024-06-03 DIAGNOSIS — E87.6 HYPOKALEMIA: ICD-10-CM

## 2024-06-03 DIAGNOSIS — K62.5 BRIGHT RED BLOOD PER RECTUM: ICD-10-CM

## 2024-06-03 DIAGNOSIS — C50.911 MALIGNANT NEOPLASM OF RIGHT FEMALE BREAST, UNSPECIFIED ESTROGEN RECEPTOR STATUS, UNSPECIFIED SITE OF BREAST (MULTI): ICD-10-CM

## 2024-06-03 DIAGNOSIS — D64.9 ANEMIA, UNSPECIFIED TYPE: Primary | ICD-10-CM

## 2024-06-03 DIAGNOSIS — E83.42 HYPOMAGNESEMIA: ICD-10-CM

## 2024-06-03 DIAGNOSIS — N18.30 STAGE 3 CHRONIC KIDNEY DISEASE DUE TO BENIGN HYPERTENSION (MULTI): ICD-10-CM

## 2024-06-03 DIAGNOSIS — K64.9 HEMORRHOIDS, UNSPECIFIED HEMORRHOID TYPE: Primary | ICD-10-CM

## 2024-06-03 DIAGNOSIS — R53.83 FATIGUE, UNSPECIFIED TYPE: ICD-10-CM

## 2024-06-03 LAB
ALBUMIN SERPL BCP-MCNC: 3.6 G/DL (ref 3.4–5)
ALP SERPL-CCNC: 64 U/L (ref 33–136)
ALT SERPL W P-5'-P-CCNC: 9 U/L (ref 7–45)
ANION GAP SERPL CALC-SCNC: 10 MMOL/L (ref 10–20)
AST SERPL W P-5'-P-CCNC: 20 U/L (ref 9–39)
BASOPHILS # BLD AUTO: 0.05 X10*3/UL (ref 0–0.1)
BASOPHILS NFR BLD AUTO: 0.6 %
BILIRUB DIRECT SERPL-MCNC: 0.1 MG/DL (ref 0–0.3)
BILIRUB SERPL-MCNC: 0.5 MG/DL (ref 0–1.2)
BUN SERPL-MCNC: 12 MG/DL (ref 6–23)
CALCIUM SERPL-MCNC: 8.8 MG/DL (ref 8.6–10.3)
CHLORIDE SERPL-SCNC: 104 MMOL/L (ref 98–107)
CO2 SERPL-SCNC: 30 MMOL/L (ref 21–32)
CREAT SERPL-MCNC: 0.91 MG/DL (ref 0.5–1.05)
EGFRCR SERPLBLD CKD-EPI 2021: 62 ML/MIN/1.73M*2
EOSINOPHIL # BLD AUTO: 0.67 X10*3/UL (ref 0–0.4)
EOSINOPHIL NFR BLD AUTO: 8.2 %
ERYTHROCYTE [DISTWIDTH] IN BLOOD BY AUTOMATED COUNT: 13.9 % (ref 11.5–14.5)
GLUCOSE SERPL-MCNC: 114 MG/DL (ref 74–99)
HCT VFR BLD AUTO: 33.5 % (ref 36–46)
HGB BLD-MCNC: 10.9 G/DL (ref 12–16)
IMM GRANULOCYTES # BLD AUTO: 0.04 X10*3/UL (ref 0–0.5)
IMM GRANULOCYTES NFR BLD AUTO: 0.5 % (ref 0–0.9)
LIPASE SERPL-CCNC: 40 U/L (ref 9–82)
LYMPHOCYTES # BLD AUTO: 1.24 X10*3/UL (ref 0.8–3)
LYMPHOCYTES NFR BLD AUTO: 15.2 %
MAGNESIUM SERPL-MCNC: 1.34 MG/DL (ref 1.6–2.4)
MCH RBC QN AUTO: 32 PG (ref 26–34)
MCHC RBC AUTO-ENTMCNC: 32.5 G/DL (ref 32–36)
MCV RBC AUTO: 98 FL (ref 80–100)
MONOCYTES # BLD AUTO: 0.72 X10*3/UL (ref 0.05–0.8)
MONOCYTES NFR BLD AUTO: 8.8 %
NEUTROPHILS # BLD AUTO: 5.44 X10*3/UL (ref 1.6–5.5)
NEUTROPHILS NFR BLD AUTO: 66.7 %
NRBC BLD-RTO: 0 /100 WBCS (ref 0–0)
PLATELET # BLD AUTO: 217 X10*3/UL (ref 150–450)
POTASSIUM SERPL-SCNC: 2.7 MMOL/L (ref 3.5–5.3)
PROT SERPL-MCNC: 6.7 G/DL (ref 6.4–8.2)
RBC # BLD AUTO: 3.41 X10*6/UL (ref 4–5.2)
SODIUM SERPL-SCNC: 141 MMOL/L (ref 136–145)
WBC # BLD AUTO: 8.2 X10*3/UL (ref 4.4–11.3)

## 2024-06-03 PROCEDURE — 82248 BILIRUBIN DIRECT: CPT | Performed by: EMERGENCY MEDICINE

## 2024-06-03 PROCEDURE — 96366 THER/PROPH/DIAG IV INF ADDON: CPT

## 2024-06-03 PROCEDURE — 36415 COLL VENOUS BLD VENIPUNCTURE: CPT | Performed by: EMERGENCY MEDICINE

## 2024-06-03 PROCEDURE — 2500000004 HC RX 250 GENERAL PHARMACY W/ HCPCS (ALT 636 FOR OP/ED): Performed by: EMERGENCY MEDICINE

## 2024-06-03 PROCEDURE — 83735 ASSAY OF MAGNESIUM: CPT | Performed by: EMERGENCY MEDICINE

## 2024-06-03 PROCEDURE — 96367 TX/PROPH/DG ADDL SEQ IV INF: CPT

## 2024-06-03 PROCEDURE — 85025 COMPLETE CBC W/AUTO DIFF WBC: CPT | Performed by: EMERGENCY MEDICINE

## 2024-06-03 PROCEDURE — 2500000002 HC RX 250 W HCPCS SELF ADMINISTERED DRUGS (ALT 637 FOR MEDICARE OP, ALT 636 FOR OP/ED): Performed by: EMERGENCY MEDICINE

## 2024-06-03 PROCEDURE — 99284 EMERGENCY DEPT VISIT MOD MDM: CPT | Mod: 25

## 2024-06-03 PROCEDURE — 83690 ASSAY OF LIPASE: CPT | Performed by: EMERGENCY MEDICINE

## 2024-06-03 PROCEDURE — 80048 BASIC METABOLIC PNL TOTAL CA: CPT | Performed by: EMERGENCY MEDICINE

## 2024-06-03 PROCEDURE — 96365 THER/PROPH/DIAG IV INF INIT: CPT

## 2024-06-03 RX ORDER — MAGNESIUM SULFATE HEPTAHYDRATE 40 MG/ML
2 INJECTION, SOLUTION INTRAVENOUS ONCE
Status: COMPLETED | OUTPATIENT
Start: 2024-06-03 | End: 2024-06-03

## 2024-06-03 RX ORDER — POTASSIUM CHLORIDE 14.9 MG/ML
20 INJECTION INTRAVENOUS
Status: COMPLETED | OUTPATIENT
Start: 2024-06-03 | End: 2024-06-03

## 2024-06-03 RX ORDER — POTASSIUM CHLORIDE 20 MEQ/1
40 TABLET, EXTENDED RELEASE ORAL ONCE
Status: COMPLETED | OUTPATIENT
Start: 2024-06-03 | End: 2024-06-03

## 2024-06-03 RX ADMIN — MAGNESIUM SULFATE IN WATER 2 G: 40 INJECTION, SOLUTION INTRAVENOUS at 15:24

## 2024-06-03 RX ADMIN — POTASSIUM CHLORIDE 20 MEQ: 14.9 INJECTION, SOLUTION INTRAVENOUS at 17:33

## 2024-06-03 RX ADMIN — POTASSIUM CHLORIDE 20 MEQ: 14.9 INJECTION, SOLUTION INTRAVENOUS at 15:18

## 2024-06-03 RX ADMIN — POTASSIUM CHLORIDE 40 MEQ: 1500 TABLET, EXTENDED RELEASE ORAL at 15:19

## 2024-06-03 ASSESSMENT — PAIN SCALES - GENERAL
PAINLEVEL_OUTOF10: 0 - NO PAIN
PAINLEVEL_OUTOF10: 0 - NO PAIN

## 2024-06-03 ASSESSMENT — PAIN - FUNCTIONAL ASSESSMENT: PAIN_FUNCTIONAL_ASSESSMENT: 0-10

## 2024-06-03 NOTE — PROGRESS NOTES
Emergency Medicine Transition of Care Note.    I received Lilly Parr in signout from Dr. Leon.  Please see the previous ED provider note for all HPI, PE and MDM up to the time of signout at 15:26. This is in addition to the primary record.    In brief Lilly Parr is an 84 y.o. female presenting for   Chief Complaint   Patient presents with    Rectal Bleeding     Patient has had diarrhea for past week and rectal bleeding today     At the time of signout we were awaiting: rectal bleeding, hypokalemia.  Plan for PO & IV potassium replacement.  Large hemorrhoids.  Plan for discharge and GI follow up after potassium replacement.    19:57 -potassium replaced.  Asymptomatic.  Referred to GI for follow-up.  Advised to return in 12 hours or sooner with persistent or worsening rectal bleeding or any other concerns.  Advised to continue potassium supplementation as ordered.  Likely has hypokalemia due to diarrhea.  Patient, family agreeable with plan and verbalized understanding.  Discharged home.    Diagnoses as of 06/03/24 1526   Hemorrhoids, unspecified hemorrhoid type   Bright red blood per rectum   Hypokalemia   Diarrhea, unspecified type   Hypomagnesemia       Medical Decision Making      Final diagnoses:   [K64.9] Hemorrhoids, unspecified hemorrhoid type   [K62.5] Bright red blood per rectum   [E87.6] Hypokalemia   [R19.7] Diarrhea, unspecified type   [E83.42] Hypomagnesemia           Procedure  Procedures    Damion Valdez DO

## 2024-06-03 NOTE — ED PROVIDER NOTES
HPI   Chief Complaint   Patient presents with    Rectal Bleeding     Patient has had diarrhea for past week and rectal bleeding today       HPI  Patient is an 84-year-old female presenting to the ED today for bright red blood per rectum.  Patient explains that she has had diarrhea for the past week with multiple bowel movements a day.  She notes that her diarrhea seems to be improving, but today noticed 2 episodes of bright red blood with her bowel movements.  She states that her bloody bowel movements are not painful in nature.  She denies any dizziness or lightheadedness.  She is not on any anticoagulation.  She otherwise denies any abdominal pain, vomiting, chest pain, cough, shortness of breath.  She has no additional concerns.                  No data recorded                   Patient History   Past Medical History:   Diagnosis Date    Breast cancer (Multi)     CKD (chronic kidney disease), stage V (Multi) 02/02/2023    Personal history of other diseases of the digestive system 12/07/2018    History of Harp's esophagus     Past Surgical History:   Procedure Laterality Date    BREAST LUMPECTOMY      CARDIAC ELECTROPHYSIOLOGY PROCEDURE N/A 3/13/2024    Procedure: PPM IMPLANT DUAL;  Surgeon: Michael Gomez MD;  Location: [facility] Cardiac Cath Lab;  Service: Electrophysiology;  Laterality: N/A;    ESOPHAGOGASTRODUODENOSCOPY  04/03/2013    Diagnostic Esophagogastroduodenoscopy    TOTAL HIP ARTHROPLASTY  08/24/2017    Total Hip Replacement     Family History   Problem Relation Name Age of Onset    Other (Other) Mother          Cardiac Faliure    Asthma Father      Breast cancer Sister       Social History     Tobacco Use    Smoking status: Never    Smokeless tobacco: Never   Vaping Use    Vaping status: Never Used   Substance Use Topics    Alcohol use: Never    Drug use: Never       Physical Exam   ED Triage Vitals [06/03/24 1347]   Temperature Heart Rate Respirations BP   36.5 °C (97.7 °F) 77 14 (!) 186/96       Pulse Ox Temp Source Heart Rate Source Patient Position   97 % Oral -- Sitting      BP Location FiO2 (%)     Left arm --       Physical Exam  Vitals and nursing note reviewed.   Constitutional:       General: She is not in acute distress.     Appearance: She is not toxic-appearing.   HENT:      Head: Normocephalic.      Mouth/Throat:      Mouth: Mucous membranes are moist.   Eyes:      Extraocular Movements: Extraocular movements intact.      Conjunctiva/sclera: Conjunctivae normal.   Cardiovascular:      Rate and Rhythm: Normal rate and regular rhythm.      Pulses: Normal pulses.   Pulmonary:      Effort: Pulmonary effort is normal. No respiratory distress.      Breath sounds: Normal breath sounds. No wheezing.   Abdominal:      General: There is no distension.      Palpations: Abdomen is soft.      Tenderness: There is no abdominal tenderness.   Genitourinary:     Comments: Performed with ADRIAN Abernathy at bedside. There is dried blood around the rectum, no active bleeding. Multiple external hemorrhoids present, none are thrombosed.  Musculoskeletal:         General: No swelling.      Cervical back: Neck supple.   Skin:     General: Skin is warm and dry.      Capillary Refill: Capillary refill takes less than 2 seconds.   Neurological:      General: No focal deficit present.      Mental Status: She is alert. Mental status is at baseline.         ED Course & MDM   Diagnoses as of 06/03/24 1510   Hemorrhoids, unspecified hemorrhoid type   Bright red blood per rectum   Hypokalemia   Diarrhea, unspecified type   Hypomagnesemia       Medical Decision Making  Patient was seen and evaluated for 2 episodes of bright red blood per rectum with her bowel movements earlier today.  Patient otherwise has no associated pain.  On arrival, vital signs are stable.  On exam, patient has multiple external hemorrhoids present.  Exam is otherwise unremarkable.  Peripheral IV is established, and labs are ordered for further evaluation of  the patient symptoms.  As patient is not having any associated pain, and no active bleeding, additional imaging is not indicated at this time.    CBC is unremarkable.  Hemoglobin is at baseline at 10.9.  BMP is significant for hypokalemia with potassium of 2.7.  Magnesium is also decreased at 1.34.  The remainder of the patient's lab work is unremarkable.  Patient's potassium will be replaced with 40 mill equivalents KCl p.o. as well as 40 mill equivalents KCl IV.  Magnesium will be replaced with magnesium sulfate 2 g IV.    Patient will be signed out to the oncoming physician, Dr. Valdez, pending electrolyte replacement and likely discharge.    Procedure  Procedures     Steffi MONTAGUE MD  06/03/24 [id number]

## 2024-06-04 ENCOUNTER — PATIENT OUTREACH (OUTPATIENT)
Dept: CARE COORDINATION | Facility: CLINIC | Age: 84
End: 2024-06-04
Payer: MEDICARE

## 2024-06-04 NOTE — PROGRESS NOTES
Outreach call to Geriatric patient to support a smooth transition of care from recent admission. Patient to ED for rectal bleeding. No answer at home number listed. Follow up with PCP is not until end of July. Will outreach again.            Chief Complaint   Patient presents with    Rectal Bleeding       Patient has had diarrhea for past week and rectal bleeding today        Kacie Vela , RN   Nurse Care Manager   [facility] Department   [phone]

## 2024-06-05 ENCOUNTER — PATIENT OUTREACH (OUTPATIENT)
Dept: CARE COORDINATION | Facility: CLINIC | Age: 84
End: 2024-06-05
Payer: MEDICARE

## 2024-06-05 NOTE — PROGRESS NOTES
Outreach call to patient to support a smooth transition of care from recent admission.  Spoke with patient, reviewed discharge medications, discharge instructions, assessed social needs, and provided education on importance of follow-up appointment with provider. Enrolled patient in [employer] chatbot for additional support and education through transition period.  Will continue to monitor through transition period.Has follow up with GI and PCP. Patient has not had any further bleeding, pain.            Chief Complaint   Patient presents with    Rectal Bleeding       Patient has had diarrhea for past week and rectal bleeding today        Kacie Vela RN   Nurse Care Manager   [facility] Accountable Care Department   [phone]

## 2024-06-17 DIAGNOSIS — I10 BENIGN ESSENTIAL HYPERTENSION: ICD-10-CM

## 2024-06-17 RX ORDER — AMLODIPINE BESYLATE 2.5 MG/1
2.5 TABLET ORAL
Qty: 90 TABLET | Refills: 1 | Status: SHIPPED | OUTPATIENT
Start: 2024-06-17

## 2024-06-25 ENCOUNTER — PATIENT OUTREACH (OUTPATIENT)
Dept: CARE COORDINATION | Facility: CLINIC | Age: 84
End: 2024-06-25
Payer: MEDICARE

## 2024-06-25 ENCOUNTER — TELEPHONE (OUTPATIENT)
Dept: HEMATOLOGY/ONCOLOGY | Facility: HOSPITAL | Age: 84
End: 2024-06-25
Payer: MEDICARE

## 2024-06-25 NOTE — TELEPHONE ENCOUNTER
Patient scheduled for HEM ONC ESTABLISHED PT NEW PHYSICIAN visit with Dr. Calvert on Wednesday, 7/10/24 at 3:30 PM. Due to provider being out of office, appointment needs to be rescheduled. Reached out to patient to reschedule appointment. Patient agreeable. Rescheduled to Thursday, 7/18/24 at 9 AM. Patient verbalized understanding and agreed to appointment change.

## 2024-06-25 NOTE — PROGRESS NOTES
Outreach call to patient following up on appointment with primary care provider.Left message for patient. This  does not see a post hospital follow up visit with Dr. Fuentes. Has Medicare annual wellness soon.        JUL 10  2024 03:30 PM - Hematology and Oncology Est. Patient New MD  [facility] Her MD      JUL 24 2024 08:15 AM - Primary Care Medicare Annual  [facility] Kacie Waller , RN   Nurse Care Manager   [facility] Care Department   [phone]

## 2024-07-08 DIAGNOSIS — I10 BENIGN ESSENTIAL HYPERTENSION: ICD-10-CM

## 2024-07-08 RX ORDER — AMLODIPINE BESYLATE 2.5 MG/1
2.5 TABLET ORAL
Qty: 90 TABLET | Refills: 1 | Status: SHIPPED | OUTPATIENT
Start: 2024-07-08

## 2024-07-10 ENCOUNTER — APPOINTMENT (OUTPATIENT)
Dept: HEMATOLOGY/ONCOLOGY | Facility: HOSPITAL | Age: 84
End: 2024-07-10
Payer: MEDICARE

## 2024-07-11 ENCOUNTER — PATIENT OUTREACH (OUTPATIENT)
Dept: CARE COORDINATION | Facility: CLINIC | Age: 84
End: 2024-07-11
Payer: MEDICARE

## 2024-07-11 NOTE — PROGRESS NOTES
Outreach call to patient 30 days after discharge.  Said hello and heard patient on line  talking and then call hung up.   Will try patient back.         Kacie Vela , RN   Nurse Care Manager   [facility]   [phone]

## 2024-07-18 ENCOUNTER — LAB (OUTPATIENT)
Dept: LAB | Facility: LAB | Age: 84
End: 2024-07-18
Payer: MEDICARE

## 2024-07-18 ENCOUNTER — OFFICE VISIT (OUTPATIENT)
Dept: HEMATOLOGY/ONCOLOGY | Facility: HOSPITAL | Age: 84
End: 2024-07-18
Payer: MEDICARE

## 2024-07-18 VITALS
BODY MASS INDEX: 29.79 KG/M2 | WEIGHT: 168.1 LBS | HEART RATE: 74 BPM | SYSTOLIC BLOOD PRESSURE: 165 MMHG | HEIGHT: 63 IN | DIASTOLIC BLOOD PRESSURE: 97 MMHG | OXYGEN SATURATION: 98 % | RESPIRATION RATE: 16 BRPM | TEMPERATURE: 97.3 F

## 2024-07-18 DIAGNOSIS — C50.011 MALIGNANT NEOPLASM OF NIPPLE AND AREOLA, RIGHT FEMALE BREAST (MULTI): ICD-10-CM

## 2024-07-18 DIAGNOSIS — C50.911 MALIGNANT NEOPLASM OF RIGHT FEMALE BREAST, UNSPECIFIED ESTROGEN RECEPTOR STATUS, UNSPECIFIED SITE OF BREAST (MULTI): ICD-10-CM

## 2024-07-18 DIAGNOSIS — D64.9 ANEMIA, UNSPECIFIED TYPE: ICD-10-CM

## 2024-07-18 DIAGNOSIS — C50.911 MALIGNANT NEOPLASM OF RIGHT FEMALE BREAST, UNSPECIFIED ESTROGEN RECEPTOR STATUS, UNSPECIFIED SITE OF BREAST (MULTI): Primary | ICD-10-CM

## 2024-07-18 LAB
ALBUMIN SERPL BCP-MCNC: 4.1 G/DL (ref 3.4–5)
ALP SERPL-CCNC: 75 U/L (ref 33–136)
ALT SERPL W P-5'-P-CCNC: 13 U/L (ref 7–45)
ANION GAP SERPL CALC-SCNC: 13 MMOL/L (ref 10–20)
AST SERPL W P-5'-P-CCNC: 25 U/L (ref 9–39)
BASOPHILS # BLD AUTO: 0.09 X10*3/UL (ref 0–0.1)
BASOPHILS NFR BLD AUTO: 1 %
BILIRUB SERPL-MCNC: 0.6 MG/DL (ref 0–1.2)
BUN SERPL-MCNC: 20 MG/DL (ref 6–23)
CALCIUM SERPL-MCNC: 10 MG/DL (ref 8.6–10.3)
CHLORIDE SERPL-SCNC: 103 MMOL/L (ref 98–107)
CO2 SERPL-SCNC: 28 MMOL/L (ref 21–32)
CREAT SERPL-MCNC: 1.06 MG/DL (ref 0.5–1.05)
EGFRCR SERPLBLD CKD-EPI 2021: 51 ML/MIN/1.73M*2
EOSINOPHIL # BLD AUTO: 0.64 X10*3/UL (ref 0–0.4)
EOSINOPHIL NFR BLD AUTO: 6.8 %
ERYTHROCYTE [DISTWIDTH] IN BLOOD BY AUTOMATED COUNT: 14.2 % (ref 11.5–14.5)
FERRITIN SERPL-MCNC: 978 NG/ML (ref 8–150)
FOLATE SERPL-MCNC: >24 NG/ML
GLUCOSE SERPL-MCNC: 100 MG/DL (ref 74–99)
HCT VFR BLD AUTO: 38.2 % (ref 36–46)
HGB BLD-MCNC: 12 G/DL (ref 12–16)
IMM GRANULOCYTES # BLD AUTO: 0.03 X10*3/UL (ref 0–0.5)
IMM GRANULOCYTES NFR BLD AUTO: 0.3 % (ref 0–0.9)
IRON SATN MFR SERPL: 22 % (ref 25–45)
IRON SERPL-MCNC: 56 UG/DL (ref 35–150)
LYMPHOCYTES # BLD AUTO: 1.44 X10*3/UL (ref 0.8–3)
LYMPHOCYTES NFR BLD AUTO: 15.3 %
MCH RBC QN AUTO: 31.2 PG (ref 26–34)
MCHC RBC AUTO-ENTMCNC: 31.4 G/DL (ref 32–36)
MCV RBC AUTO: 99 FL (ref 80–100)
MONOCYTES # BLD AUTO: 0.63 X10*3/UL (ref 0.05–0.8)
MONOCYTES NFR BLD AUTO: 6.7 %
NEUTROPHILS # BLD AUTO: 6.61 X10*3/UL (ref 1.6–5.5)
NEUTROPHILS NFR BLD AUTO: 69.9 %
NRBC BLD-RTO: 0 /100 WBCS (ref 0–0)
PLATELET # BLD AUTO: 235 X10*3/UL (ref 150–450)
POTASSIUM SERPL-SCNC: 3.9 MMOL/L (ref 3.5–5.3)
PROT SERPL-MCNC: 7.5 G/DL (ref 6.4–8.2)
RBC # BLD AUTO: 3.85 X10*6/UL (ref 4–5.2)
SODIUM SERPL-SCNC: 140 MMOL/L (ref 136–145)
TIBC SERPL-MCNC: 255 UG/DL (ref 240–445)
UIBC SERPL-MCNC: 199 UG/DL (ref 110–370)
VIT B12 SERPL-MCNC: 516 PG/ML (ref 211–911)
WBC # BLD AUTO: 9.4 X10*3/UL (ref 4.4–11.3)

## 2024-07-18 PROCEDURE — 99214 OFFICE O/P EST MOD 30 MIN: CPT | Performed by: INTERNAL MEDICINE

## 2024-07-18 PROCEDURE — 83550 IRON BINDING TEST: CPT

## 2024-07-18 PROCEDURE — 80053 COMPREHEN METABOLIC PANEL: CPT

## 2024-07-18 PROCEDURE — 3080F DIAST BP >= 90 MM HG: CPT | Performed by: INTERNAL MEDICINE

## 2024-07-18 PROCEDURE — 85025 COMPLETE CBC W/AUTO DIFF WBC: CPT

## 2024-07-18 PROCEDURE — 1157F ADVNC CARE PLAN IN RCRD: CPT | Performed by: INTERNAL MEDICINE

## 2024-07-18 PROCEDURE — 82728 ASSAY OF FERRITIN: CPT

## 2024-07-18 PROCEDURE — 83540 ASSAY OF IRON: CPT

## 2024-07-18 PROCEDURE — 1159F MED LIST DOCD IN RCRD: CPT | Performed by: INTERNAL MEDICINE

## 2024-07-18 PROCEDURE — 82607 VITAMIN B-12: CPT

## 2024-07-18 PROCEDURE — 82746 ASSAY OF FOLIC ACID SERUM: CPT

## 2024-07-18 PROCEDURE — 36415 COLL VENOUS BLD VENIPUNCTURE: CPT

## 2024-07-18 PROCEDURE — 1126F AMNT PAIN NOTED NONE PRSNT: CPT | Performed by: INTERNAL MEDICINE

## 2024-07-18 PROCEDURE — 3077F SYST BP >= 140 MM HG: CPT | Performed by: INTERNAL MEDICINE

## 2024-07-18 ASSESSMENT — ENCOUNTER SYMPTOMS
RESPIRATORY NEGATIVE: 1
OCCASIONAL FEELINGS OF UNSTEADINESS: 0
CONSTITUTIONAL NEGATIVE: 1
DEPRESSION: 0
GASTROINTESTINAL NEGATIVE: 1
LOSS OF SENSATION IN FEET: 0

## 2024-07-18 ASSESSMENT — PATIENT HEALTH QUESTIONNAIRE - PHQ9
2. FEELING DOWN, DEPRESSED OR HOPELESS: NOT AT ALL
1. LITTLE INTEREST OR PLEASURE IN DOING THINGS: NOT AT ALL
SUM OF ALL RESPONSES TO PHQ9 QUESTIONS 1 AND 2: 0

## 2024-07-18 ASSESSMENT — PAIN SCALES - GENERAL: PAINLEVEL: 0-NO PAIN

## 2024-07-18 NOTE — PROGRESS NOTES
"Patient ID: Lilly Parr is a 84 y.o. female.    Subjective:  Returns for follow up for breast cancer and anemia. Denies having new complaints. No chest /abdominal pain. No fever or infections.     Heme/Onc History:  Stage/Status:  - Stage I ER +, her2 neg R breast ca, s/p lumpectomy in May 2016 => adjuvant Arimidex until Sep 2021  - Anemia. Required IV iron. On PO B12.     Assessment/Plan:  ? Breast cancer: Has been off Arimidex for 3 years. No sign of recurrence. Last MMG was neg. I asked her to continue it for a few more years. Ordered another one.     ? Anemia: Today Hb is 12. Normal for her age. No need for further work-up. Ferritin elevated. Will ask her to stop taking PO iron    Review Of Systems:  Review of Systems   Constitutional: Negative.    Respiratory: Negative.     Gastrointestinal: Negative.        Physical Exam:  BP (!) 165/97 (BP Location: Left arm, Patient Position: Sitting, BP Cuff Size: Adult) Comment: checks pressure daily , usually 120s to 130 systolic over 60-70.  Pulse 74   Temp 36.3 °C (97.3 °F) (Temporal)   Resp 16   Ht 1.6 m (5' 3\")   Wt 76.2 kg (168 lb 1.6 oz)   SpO2 98%   BMI 29.78 kg/m²   BSA: 1.84 meters squared  Performance Status: Symptomatic; fully ambulatory  Physical Exam  Constitutional:       Appearance: Normal appearance.   Cardiovascular:      Rate and Rhythm: Normal rate.   Pulmonary:      Effort: Pulmonary effort is normal.   Abdominal:      General: Abdomen is flat.   Neurological:      Mental Status: She is alert.         Results:  Diagnostic Results   Lab Results   Component Value Date    WBC 9.4 07/18/2024    HGB 12.0 07/18/2024    HCT 38.2 07/18/2024    MCV 99 07/18/2024     07/18/2024     Lab Results   Component Value Date    CALCIUM 10.0 07/18/2024     07/18/2024    K 3.9 07/18/2024    CO2 28 07/18/2024     07/18/2024    BUN 20 07/18/2024    CREATININE 1.06 (H) 07/18/2024    ALT 13 07/18/2024    AST 25 07/18/2024       Current " Outpatient Medications:     allopurinol (Zyloprim) 100 mg tablet, TAKE 2 TABLETS BY MOUTH ONCE  DAILY, Disp: 180 tablet, Rfl: 1    amLODIPine (Norvasc) 2.5 mg tablet, Take 1 tablet (2.5 mg) by mouth once daily in the morning. Take before meals., Disp: 90 tablet, Rfl: 1    aspirin 81 mg EC tablet, Take 1 tablet (81 mg) by mouth once daily in the evening., Disp: , Rfl:     atorvastatin (Lipitor) 20 mg tablet, Take 1 tablet (20 mg) by mouth once daily at bedtime., Disp: 90 tablet, Rfl: 3    carvedilol (Coreg) 6.25 mg tablet, Take 1 tablet (6.25 mg) by mouth 2 times a day with meals., Disp: 60 tablet, Rfl: 0    cholecalciferol (Vitamin D-3) 125 MCG (5000 UT) capsule, Take 1 capsule (125 mcg) by mouth once daily in the evening., Disp: , Rfl:     cyanocobalamin, vitamin B-12, 5,000 mcg tablet, sublingual, Place 1 tablet under the tongue once daily at noon. Take with meals., Disp: , Rfl:     ferrous sulfate, 325 mg ferrous sulfate, (FeroSuL) tablet, Take 1 tablet by mouth 2 times a day., Disp: 180 tablet, Rfl: 1    folic acid (Folvite) 1 mg tablet, Take 1 tablet (1 mg) by mouth once daily at noon. Take with meals., Disp: 90 tablet, Rfl: 1    levothyroxine (Synthroid, Levoxyl) 50 mcg tablet, Take 1 tablet (50 mcg) by mouth once daily in the morning., Disp: 90 tablet, Rfl: 3    losartan (Cozaar) 50 mg tablet, Take 1 tablet (50 mg) by mouth once daily., Disp: 90 tablet, Rfl: 1    nystatin (Mycostatin) 100,000 unit/gram powder, Apply topically 3 times a day., Disp: 60 g, Rfl: 6    omeprazole (PriLOSEC) 20 mg DR capsule, Take 1 capsule (20 mg) by mouth once daily at bedtime. Before eating, Disp: 90 capsule, Rfl: 1    Vitamin B-6 25 mg tablet, Take 1 tablet (25 mg) by mouth once daily at noon. Take with meals., Disp: , Rfl:      Past Surgical History:   Procedure Laterality Date    BREAST LUMPECTOMY      CARDIAC ELECTROPHYSIOLOGY PROCEDURE N/A 3/13/2024    Procedure: PPM IMPLANT DUAL;  Surgeon: Michael Gomez MD;   Location: [facility];  Service: Electrophysiology;  Laterality: N/A;    ESOPHAGOGASTRODUODENOSCOPY  04/03/2013    Diagnostic Esophagogastroduodenoscopy    TOTAL HIP ARTHROPLASTY  08/24/2017    Total Hip Replacement     Family History   Problem Relation Name Age of Onset    Other (Other) Mother          Cardiac Faliure    Asthma Father      Breast cancer Sister        reports that she has never smoked. She has never used smokeless tobacco.    Diagnoses and all orders for this visit:  Malignant neoplasm of right female breast, unspecified estrogen receptor status, unspecified site of breast (Multi)  -     Clinic Appointment Request  -     Clinic Appointment Request; Future  -     CBC and Auto Differential; Future  -     Comprehensive Metabolic Panel; Future  -     CBC and Auto Differential; Future  -     Comprehensive Metabolic Panel; Future  -     Vitamin B12; Future  -     Ferritin; Future  -     Folate; Future  -     Iron and TIBC; Future  -     BI mammo bilateral diagnostic; Future  Anemia, unspecified type  -     Clinic Appointment Request  -     Clinic Appointment Request; Future  -     CBC and Auto Differential; Future  -     Comprehensive Metabolic Panel; Future  -     CBC and Auto Differential; Future  -     Comprehensive Metabolic Panel; Future  -     Vitamin B12; Future  -     Ferritin; Future  -     Folate; Future  -     Iron and TIBC; Future  -     BI mammo bilateral diagnostic; Future  Malignant neoplasm of nipple and areola, 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   Morales Her MD

## 2024-07-19 ENCOUNTER — TELEPHONE (OUTPATIENT)
Dept: HEMATOLOGY/ONCOLOGY | Facility: HOSPITAL | Age: 84
End: 2024-07-19
Payer: MEDICARE

## 2024-07-19 NOTE — TELEPHONE ENCOUNTER
Please call and ask her to stop taking PO iron. Her iron level is elevated now. per Dr. SHERITA Her staff message.        Contacted patient, notified her iron level is elevated now and that she should stop taking her oral iron medication. Patient verbalized understanding.

## 2024-07-24 ENCOUNTER — APPOINTMENT (OUTPATIENT)
Dept: PRIMARY CARE | Facility: CLINIC | Age: 84
End: 2024-07-24
Payer: MEDICARE

## 2024-07-24 VITALS
HEART RATE: 102 BPM | WEIGHT: 169.6 LBS | DIASTOLIC BLOOD PRESSURE: 94 MMHG | BODY MASS INDEX: 31.21 KG/M2 | OXYGEN SATURATION: 100 % | TEMPERATURE: 96.8 F | HEIGHT: 62 IN | SYSTOLIC BLOOD PRESSURE: 158 MMHG

## 2024-07-24 DIAGNOSIS — E61.1 IRON DEFICIENCY: ICD-10-CM

## 2024-07-24 DIAGNOSIS — E78.00 HYPERCHOLESTEREMIA: ICD-10-CM

## 2024-07-24 DIAGNOSIS — I12.9 STAGE 3 CHRONIC KIDNEY DISEASE DUE TO BENIGN HYPERTENSION (MULTI): ICD-10-CM

## 2024-07-24 DIAGNOSIS — E78.5 DYSLIPIDEMIA: ICD-10-CM

## 2024-07-24 DIAGNOSIS — E03.9 HYPOTHYROIDISM, UNSPECIFIED TYPE: ICD-10-CM

## 2024-07-24 DIAGNOSIS — Z00.00 ROUTINE GENERAL MEDICAL EXAMINATION AT HEALTH CARE FACILITY: Primary | ICD-10-CM

## 2024-07-24 DIAGNOSIS — N18.30 STAGE 3 CHRONIC KIDNEY DISEASE DUE TO BENIGN HYPERTENSION (MULTI): ICD-10-CM

## 2024-07-24 DIAGNOSIS — Z13.89 SCREENING FOR MULTIPLE CONDITIONS: ICD-10-CM

## 2024-07-24 PROCEDURE — 1160F RVW MEDS BY RX/DR IN RCRD: CPT | Performed by: INTERNAL MEDICINE

## 2024-07-24 PROCEDURE — 1158F ADVNC CARE PLAN TLK DOCD: CPT | Performed by: INTERNAL MEDICINE

## 2024-07-24 PROCEDURE — G0439 PPPS, SUBSEQ VISIT: HCPCS | Performed by: INTERNAL MEDICINE

## 2024-07-24 PROCEDURE — 99214 OFFICE O/P EST MOD 30 MIN: CPT | Performed by: INTERNAL MEDICINE

## 2024-07-24 PROCEDURE — 1159F MED LIST DOCD IN RCRD: CPT | Performed by: INTERNAL MEDICINE

## 2024-07-24 PROCEDURE — 1123F ACP DISCUSS/DSCN MKR DOCD: CPT | Performed by: INTERNAL MEDICINE

## 2024-07-24 PROCEDURE — 1170F FXNL STATUS ASSESSED: CPT | Performed by: INTERNAL MEDICINE

## 2024-07-24 PROCEDURE — 3080F DIAST BP >= 90 MM HG: CPT | Performed by: INTERNAL MEDICINE

## 2024-07-24 PROCEDURE — 3077F SYST BP >= 140 MM HG: CPT | Performed by: INTERNAL MEDICINE

## 2024-07-24 PROCEDURE — 1036F TOBACCO NON-USER: CPT | Performed by: INTERNAL MEDICINE

## 2024-07-24 PROCEDURE — 1157F ADVNC CARE PLAN IN RCRD: CPT | Performed by: INTERNAL MEDICINE

## 2024-07-24 ASSESSMENT — ACTIVITIES OF DAILY LIVING (ADL)
BATHING: INDEPENDENT
MANAGING_FINANCES: INDEPENDENT
DOING_HOUSEWORK: INDEPENDENT
TAKING_MEDICATION: INDEPENDENT
GROCERY_SHOPPING: INDEPENDENT
DRESSING: INDEPENDENT

## 2024-07-24 ASSESSMENT — ENCOUNTER SYMPTOMS
UNEXPECTED WEIGHT CHANGE: 0
HEADACHES: 0
ABDOMINAL PAIN: 0
DIZZINESS: 0
PALPITATIONS: 0
DIARRHEA: 0
ARTHRALGIAS: 0
COUGH: 0
BLOOD IN STOOL: 0
FATIGUE: 0
BRUISES/BLEEDS EASILY: 0
DIFFICULTY URINATING: 0
SORE THROAT: 0
SINUS PAIN: 0
FEVER: 0
WHEEZING: 0

## 2024-07-24 ASSESSMENT — PATIENT HEALTH QUESTIONNAIRE - PHQ9
1. LITTLE INTEREST OR PLEASURE IN DOING THINGS: NOT AT ALL
2. FEELING DOWN, DEPRESSED OR HOPELESS: NOT AT ALL
SUM OF ALL RESPONSES TO PHQ9 QUESTIONS 1 AND 2: 0

## 2024-07-24 NOTE — PROGRESS NOTES
Subjective   Reason for Visit: Lilly Parr is an 84 y.o. female here for a Medicare Wellness visit.     Past Medical, Surgical, and Family History reviewed and updated in chart.    Reviewed all medications by prescribing practitioner or clinical pharmacist (such as prescriptions, OTCs, herbal therapies and supplements) and documented in the medical record.    Annual preventive visit  - Vaccinations reviewed and up-to-date  -Screen for colon cancer not needed with the patient age  - S screening for breast cancer if not needed seen by her oncologist no need for follow-up patient opted not to screen aware of risk and benefit  - Screen for depression negative  I spent 15 minutes obtaining and discussing depression screening using PHQ-2 questions with results documented in the chart.  -Advance of directive reviewed    Follow-up  -Blood work results reviewed with patient up-to-date  - Hypertension improved continue on carvedilol amlodipine and losartan no medication side effect  - Status post pacemaker placement doing well no palpitations  -Reflux disease symptoms continue with omeprazole 20 mg daily.  - Continue on aspirin vitamin D ferrous sulfate folic acid and vitamin B6  Reevaluate patient in 4 weeks  - CT results are chronic lung nodule with benign lesion asymptomatic no need for further screening  No shortness of breath no coughing no wheezing continue monitor conservatively  -Chronic anemia continue with current vitamin B12 and vitamin B6 iron supplement anemia of chronic disease  - Patient declined bone marrow biopsy aware of risk and benefit continue monitor conservatively  --Chronic kidney disease, controlled continue with low-salt diet increase fluid intake no change continue monitoring conservatively patient scheduled to see nephrology again in September 2024  -Breast cancer in remission continue   -Hypothyroid continue the levothyroxine , compensated.  Follow-up 3 months                Patient Care  "Team:  Jarvis Fuentes MD as PCP - General  Jarvis Fuentes MD as PCP - [facility]  Flor Barton MD as Consulting Physician (Hematology and Oncology)  Kacie Glover, RN as Care Manager (Case Management)  Morales Her MD as Medical Oncologist (Hematology and Oncology)     Review of Systems   Constitutional:  Negative for fatigue, fever and unexpected weight change.   HENT:  Negative for congestion, ear discharge, ear pain, mouth sores, sinus pain and sore throat.    Eyes:  Negative for visual disturbance.   Respiratory:  Negative for cough and wheezing.    Cardiovascular:  Negative for chest pain, palpitations and leg swelling.   Gastrointestinal:  Negative for abdominal pain, blood in stool and diarrhea.   Genitourinary:  Negative for difficulty urinating.   Musculoskeletal:  Negative for arthralgias.   Skin:  Negative for rash.   Neurological:  Negative for dizziness and headaches.   Hematological:  Does not bruise/bleed easily.   Psychiatric/Behavioral:  Negative for behavioral problems.    All other systems reviewed and are negative.      Objective   Vitals:  BP (!) 158/94   Pulse 102   Temp 36 °C (96.8 °F)   Ht 1.562 m (5' 1.5\")   Wt 76.9 kg (169 lb 9.6 oz)   SpO2 100%   BMI 31.53 kg/m²     Lab Results   Component Value Date    WBC 9.4 07/18/2024    HGB 12.0 07/18/2024    HCT 38.2 07/18/2024     07/18/2024    CHOL 130 10/04/2023    TRIG 98 10/04/2023    HDL 46.4 10/04/2023    ALT 13 07/18/2024    AST 25 07/18/2024     07/18/2024    K 3.9 07/18/2024     07/18/2024    CREATININE 1.06 (H) 07/18/2024    BUN 20 07/18/2024    CO2 28 07/18/2024    TSH 1.88 03/11/2024    INR 1.0 03/11/2024     par   Physical Exam  Vitals and nursing note reviewed.   Constitutional:       Appearance: Normal appearance.   HENT:      Head: Normocephalic.      Nose: Nose normal.   Eyes:      Conjunctiva/sclera: Conjunctivae normal.      Pupils: Pupils are equal, round, and " reactive to light.   Cardiovascular:      Rate and Rhythm: Regular rhythm.   Pulmonary:      Effort: Pulmonary effort is normal.      Breath sounds: Normal breath sounds.   Abdominal:      General: Abdomen is flat.      Palpations: Abdomen is soft.   Musculoskeletal:      Cervical back: Neck supple.   Skin:     General: Skin is warm.   Neurological:      General: No focal deficit present.      Mental Status: She is oriented to person, place, and time.   Psychiatric:         Mood and Affect: Mood normal.         Assessment/Plan   Problem List Items Addressed This Visit             ICD-10-CM    Dyslipidemia E78.5    Hypothyroidism E03.9    Stage 3 chronic kidney disease due to benign hypertension (Multi) I12.9, N18.30    Iron deficiency E61.1     Other Visit Diagnoses         Codes    Routine general medical examination at health care facility    -  Primary Z00.00    Screening for multiple conditions     Z13.89    Hypercholesteremia     E78.00          Annual preventive visit  - Vaccinations reviewed and up-to-date  -Screen for colon cancer not needed with the patient age  - S screening for breast cancer if not needed seen by her oncologist no need for follow-up patient opted not to screen aware of risk and benefit  - Screen for depression negative  I spent 15 minutes obtaining and discussing depression screening using PHQ-2 questions with results documented in the chart.  -Advance of directive reviewed    Follow-up  -Blood work results reviewed with patient up-to-date  - Hypertension improved continue on carvedilol amlodipine and losartan no medication side effect  - Status post pacemaker placement doing well no palpitations  -Reflux disease symptoms continue with omeprazole 20 mg daily.  - Continue on aspirin vitamin D ferrous sulfate folic acid and vitamin B6  Reevaluate patient in 4 weeks  - CT results are chronic lung nodule with benign lesion asymptomatic no need for further screening  No shortness of breath no  coughing no wheezing continue monitor conservatively  -Chronic anemia continue with current vitamin B12 and vitamin B6 iron supplement anemia of chronic disease  - Patient declined bone marrow biopsy aware of risk and benefit continue monitor conservatively  --Chronic kidney disease, controlled continue with low-salt diet increase fluid intake no change continue monitoring conservatively patient scheduled to see nephrology again in September 2024  -Breast cancer in remission continue   -Hypothyroid continue the levothyroxine , compensated.  Follow-up 3 months            Depression Screening  5 - 10 minutes were spent screening for depression.

## 2024-07-30 ENCOUNTER — APPOINTMENT (OUTPATIENT)
Dept: GASTROENTEROLOGY | Facility: CLINIC | Age: 84
End: 2024-07-30
Payer: MEDICARE

## 2024-08-07 DIAGNOSIS — K21.9 GERD WITHOUT ESOPHAGITIS: ICD-10-CM

## 2024-08-08 RX ORDER — OMEPRAZOLE 20 MG/1
CAPSULE, DELAYED RELEASE ORAL
Qty: 90 CAPSULE | Refills: 1 | Status: SHIPPED | OUTPATIENT
Start: 2024-08-08

## 2024-08-26 DIAGNOSIS — E78.5 DYSLIPIDEMIA: ICD-10-CM

## 2024-08-26 DIAGNOSIS — M10.9 GOUT, UNSPECIFIED CAUSE, UNSPECIFIED CHRONICITY, UNSPECIFIED SITE: ICD-10-CM

## 2024-08-26 RX ORDER — ATORVASTATIN CALCIUM 20 MG/1
20 TABLET, FILM COATED ORAL NIGHTLY
Qty: 90 TABLET | Refills: 0 | Status: SHIPPED | OUTPATIENT
Start: 2024-08-26 | End: 2024-11-24

## 2024-08-27 RX ORDER — ALLOPURINOL 100 MG/1
200 TABLET ORAL DAILY
Qty: 180 TABLET | Refills: 0 | Status: SHIPPED | OUTPATIENT
Start: 2024-08-27

## 2024-09-08 DIAGNOSIS — D64.9 ANEMIA, UNSPECIFIED TYPE: ICD-10-CM

## 2024-09-09 ENCOUNTER — PATIENT OUTREACH (OUTPATIENT)
Dept: CARE COORDINATION | Facility: CLINIC | Age: 84
End: 2024-09-09
Payer: MEDICARE

## 2024-09-09 RX ORDER — FOLIC ACID 1 MG/1
TABLET ORAL
Qty: 90 TABLET | Refills: 0 | Status: SHIPPED | OUTPATIENT
Start: 2024-09-09

## 2024-09-09 NOTE — PROGRESS NOTES
Outreach call to patient to check in 30 days after hospital discharge to support smooth transition of care.  Will continue to monitor through transition period.  Last time I called , patient did not answer so this is the 30 day check in. Patient has no further concerns/questions.     NEENAD

## 2024-09-26 DIAGNOSIS — E21.3 HYPERPARATHYROIDISM (MULTI): ICD-10-CM

## 2024-09-26 DIAGNOSIS — N18.30 STAGE 3 CHRONIC KIDNEY DISEASE DUE TO BENIGN HYPERTENSION (MULTI): ICD-10-CM

## 2024-09-26 DIAGNOSIS — D64.9 ANEMIA, UNSPECIFIED TYPE: ICD-10-CM

## 2024-09-26 DIAGNOSIS — C50.911 MALIGNANT NEOPLASM OF RIGHT FEMALE BREAST, UNSPECIFIED ESTROGEN RECEPTOR STATUS, UNSPECIFIED SITE OF BREAST: ICD-10-CM

## 2024-09-26 DIAGNOSIS — I12.9 STAGE 3 CHRONIC KIDNEY DISEASE DUE TO BENIGN HYPERTENSION (MULTI): ICD-10-CM

## 2024-09-26 DIAGNOSIS — E55.9 VITAMIN D DEFICIENCY: ICD-10-CM

## 2024-09-26 DIAGNOSIS — I10 BENIGN ESSENTIAL HYPERTENSION: ICD-10-CM

## 2024-10-01 DIAGNOSIS — I10 BENIGN ESSENTIAL HYPERTENSION: ICD-10-CM

## 2024-10-01 RX ORDER — LOSARTAN POTASSIUM 50 MG/1
50 TABLET ORAL DAILY
Qty: 90 TABLET | Refills: 0 | Status: SHIPPED | OUTPATIENT
Start: 2024-10-01

## 2024-10-04 ENCOUNTER — LAB (OUTPATIENT)
Dept: LAB | Facility: LAB | Age: 84
End: 2024-10-04
Payer: MEDICARE

## 2024-10-04 DIAGNOSIS — D64.9 ANEMIA, UNSPECIFIED TYPE: ICD-10-CM

## 2024-10-04 DIAGNOSIS — C50.911 MALIGNANT NEOPLASM OF RIGHT FEMALE BREAST, UNSPECIFIED ESTROGEN RECEPTOR STATUS, UNSPECIFIED SITE OF BREAST: ICD-10-CM

## 2024-10-04 LAB
ALBUMIN SERPL BCP-MCNC: 4.2 G/DL (ref 3.4–5)
ALP SERPL-CCNC: 69 U/L (ref 33–136)
ALT SERPL W P-5'-P-CCNC: 11 U/L (ref 7–45)
ANION GAP SERPL CALC-SCNC: 12 MMOL/L (ref 10–20)
AST SERPL W P-5'-P-CCNC: 24 U/L (ref 9–39)
BILIRUB SERPL-MCNC: 0.5 MG/DL (ref 0–1.2)
BUN SERPL-MCNC: 20 MG/DL (ref 6–23)
CALCIUM SERPL-MCNC: 9.8 MG/DL (ref 8.6–10.3)
CHLORIDE SERPL-SCNC: 107 MMOL/L (ref 98–107)
CO2 SERPL-SCNC: 25 MMOL/L (ref 21–32)
CREAT SERPL-MCNC: 0.9 MG/DL (ref 0.5–1.05)
EGFRCR SERPLBLD CKD-EPI 2021: 62 ML/MIN/1.73M*2
GLUCOSE SERPL-MCNC: 102 MG/DL (ref 74–99)
POTASSIUM SERPL-SCNC: 4.1 MMOL/L (ref 3.5–5.3)
PROT SERPL-MCNC: 6.9 G/DL (ref 6.4–8.2)
SODIUM SERPL-SCNC: 140 MMOL/L (ref 136–145)

## 2024-10-04 PROCEDURE — 36415 COLL VENOUS BLD VENIPUNCTURE: CPT

## 2024-10-07 ENCOUNTER — APPOINTMENT (OUTPATIENT)
Dept: NEPHROLOGY | Facility: CLINIC | Age: 84
End: 2024-10-07
Payer: MEDICARE

## 2024-10-07 VITALS
RESPIRATION RATE: 16 BRPM | OXYGEN SATURATION: 98 % | BODY MASS INDEX: 30.09 KG/M2 | HEART RATE: 77 BPM | WEIGHT: 169.8 LBS | DIASTOLIC BLOOD PRESSURE: 101 MMHG | TEMPERATURE: 98 F | HEIGHT: 63 IN | SYSTOLIC BLOOD PRESSURE: 200 MMHG

## 2024-10-07 DIAGNOSIS — I12.9 STAGE 3 CHRONIC KIDNEY DISEASE DUE TO BENIGN HYPERTENSION (MULTI): ICD-10-CM

## 2024-10-07 DIAGNOSIS — N18.30 STAGE 3 CHRONIC KIDNEY DISEASE DUE TO BENIGN HYPERTENSION (MULTI): ICD-10-CM

## 2024-10-07 DIAGNOSIS — I10 BENIGN ESSENTIAL HYPERTENSION: Primary | ICD-10-CM

## 2024-10-07 DIAGNOSIS — E21.3 HYPERPARATHYROIDISM (MULTI): ICD-10-CM

## 2024-10-07 DIAGNOSIS — E55.9 VITAMIN D DEFICIENCY: ICD-10-CM

## 2024-10-07 PROCEDURE — 99214 OFFICE O/P EST MOD 30 MIN: CPT | Performed by: INTERNAL MEDICINE

## 2024-10-07 PROCEDURE — 3080F DIAST BP >= 90 MM HG: CPT | Performed by: INTERNAL MEDICINE

## 2024-10-07 PROCEDURE — 1157F ADVNC CARE PLAN IN RCRD: CPT | Performed by: INTERNAL MEDICINE

## 2024-10-07 PROCEDURE — 1159F MED LIST DOCD IN RCRD: CPT | Performed by: INTERNAL MEDICINE

## 2024-10-07 PROCEDURE — 3077F SYST BP >= 140 MM HG: CPT | Performed by: INTERNAL MEDICINE

## 2024-10-07 NOTE — PATIENT INSTRUCTIONS
Dear MAITE   It was nice seeing you in the nephrology clinic today     Today we discussed the following:     # Chronic kidney disease stage 3 ~ 30-40 percent-now improved and up to above 60 %-good job  # Anemia: Improved with no symptoms  # Hypertension: Good per home blood pressure readings but high today in office-?  Whitecoat syndrome     Plan  - Please follow healthy life style, watch salt in diet, avoid soy sauce and processed meat, limit soda drinks. Exercise regularly. No smoking. Check your blood pressure daily - same time of the day - and write numbers down. Please bring log with you next visit.     - Continue Losartan and carvedilol and amlodipine 2.5 mg      I will see you in 12 months with another repeat blood work and urine analysis.       Please call our office if you have any question  Thank you for coming to see me today     Mari Pierre MD, MS, JAYY PAIGE  Clinical  - [employer]  Nephrologist - [facility]

## 2024-10-07 NOTE — PROGRESS NOTES
Subjective     Ms Keshav is a 85y/o/c/f w PMH of  Ca s/p surgery 2015, Anemia, HTN, HLD, gout, hypothyroid, prior COVID 19 infection, mitral valve calcification abd CKD who is coming today as CKD follow-up    Last office visit November 2023.  Lilly came today with her .  No kidney related complaints or concerns.  No leg swelling or shortness of breath.  Blood pressure is accepted at home with average reading 1 20-1 30/70.  Today, blood pressure is elevated 180/101.  No headache, blurry vision, dizziness, chest pain.  She ensure good blood pressure control at home and concerns about whitecoat syndrome.  Repeat blood work done in October 2024 showed normal serum creatinine 0.9 and GFR of 60.  Within normal lecture lites.  No anemia hemoglobin 12 with improved iron storage.  Overall she is doing well and stable from kidney standpoint      Prior notes  Last office visit April 2023.  Lilly came today with her .  No kidney related complaints or concerns.  No leg swelling or shortness of breath.  Good adherence to medications and adequate hydration.  Most recent blood work done few days ago we was discussed with him including stable serum creatinine 1 and GFR 51 with a normal electrolytes and no significant acidosis.  Improved anemia hemoglobin 11.1.  No significant protein leak in the urine.  Overall she is stable and enjoying good health    Per prior notes     Last office visit September 2022. In the interim-continues to be stable. Lilly came today with her . No lower urine tract symptoms. She has mild leg swelling-stable. No lower urinary tract symptoms. She had blood work done this morning and all results were discussed with her in details including stable serum creatinine and GFR and within normal electrolytes. Uric acid is normal-on allopurinol.. She continues to check blood pressure and ure is elevated today in office 182/83-asymptomatic at home and average reading 130-80. She is adherent  to medications and low-salt diet        Her prior notes     Last office visit February 2022. In interim, she continues to do well. She checks blood pressure regularly at home and average reading 120-130/70. Recently she had amlodipine 2.5 mg added to her medication list. No symptoms or complaints today. She had blood work done last month and all results were discussed with her and her  in detail today including stable improving serum creatinine 1.1 and GFR 47 and within normal electrolytes. Within normal magnesium and vitamin D. No significant albuminuria. Improving hemoglobin 11.3-asymptomatic. No pain or burning with urination. No lower urinary tract symptoms. No recent gout flares. She is adherent to medications and low-salt diet     Her prior notes     Last office visit August 2021. In the interim no events. She continues to be adherent to blood pressure medications. She checks blood pressure frequently at home and average reading 130/70 per her and her 's report. She was surprised to see blood pressure elevated today to 200. She is asymptomatic. She follows low-salt diet. Reviewed blood work done in February 2022 was discussed with patient and  today. Serum creatinine improved 1.2, GFR improved to 44 mils per minute per 1.73 mÂ², anemia improved and iron storage improved. Urinalysis with mild albuminuria. Discussed healthy lifestyle with patient and  today. Overall she is doing great     Per prior notes     Last office visit February 2021. Hemoglobin was 7.1 with low iron storage. She received IV iron at [facility]. Repeat blood work showed improved CBC and improved iron storage. Today, she came with her . She reports feeling anxious today as her  was driving fast. Blood pressure in office today is high. She reports she checks blood pressure at home frequently and average reading 130/80. She is adherent to low-salt diet and blood pressure medications. She  "feels in baseline state of health. No leg swelling or shortness of breath. She follows up with hematology for breast cancer. No complaints or concerns today     Per prior notes     Patient is coming today with her   checks BP at home 130/60s  No issues urination   No recent gout attacks  Tylenol only of pian   She does not follow low salt diet      Fam Hx : no kidney issues,   Social:  for 63 years, 4 sons, never smoker, no wine or drugs        Objective   BP (!) 200/101 (BP Location: Left arm, Patient Position: Standing, BP Cuff Size: Large adult)   Pulse 77   Temp 36.7 °C (98 °F)   Resp 16   Ht 1.6 m (5' 3\")   Wt 77 kg (169 lb 12.8 oz)   SpO2 98%   BMI 30.08 kg/m²   Wt Readings from Last 3 Encounters:   10/07/24 77 kg (169 lb 12.8 oz)   07/24/24 76.9 kg (169 lb 9.6 oz)   07/18/24 76.2 kg (168 lb 1.6 oz)       Physical Exam    General appearance: no distress awake and alert on room air, euvolemic on exam  Eyes: non-icteric  HEENT: atrumatic head, PEERLA, moist mucosa  Skin: no apparent rash  Heart: NSR, S1, S2 normal, no murmur or gallop  Lungs: Symmetrical expansion,CTA bilat no wheezing/crackles  Abdomen: soft, nt/nd, obese  Extremities: Trace edema bilat  Neuro: No FND,asterixis, no focal deficits noticed        Review of Systems     Constitutional: no fever, no chills, no recent weight gain and no recent weight loss.   Eyes: no blurred vision and no diplopia.   ENT: no hearing loss, no earache, no sore throat, no swollen glands in the neck and no nasal discharge.   Cardiovascular: no chest pain, no palpitations and no lower extremity edema.   Respiratory: no shortness of breath, no chronic cough and no shortness of breath during exertion.   Gastrointestinal: no abdominal pain, no constipation, no heartburn, no vomiting, no bloody stools and no change in bowel movements.   Genitourinary: no dysuria and no hematuria.   Musculoskeletal: no arthralgias and no myalgias.   Skin: no rashes and no " "skin lesions.   Neurological: no headaches and no dizziness.   Psychiatric: no confusion, no depression and no anxiety.   Endocrine: no heat intolerance, no cold intolerance, appetite not increased, no thyroid disorder, no increased urinary frequency and no dry skin.   Hematologic/Lymphatic: does not bleed easily and does not bruise easily.   All other systems have been reviewed and are negative for complaint.         Data Review               Results from last 7 days   Lab Units 10/04/24  0725   SODIUM mmol/L 140   POTASSIUM mmol/L 4.1   CHLORIDE mmol/L 107   CO2 mmol/L 25   BUN mg/dL 20   CREATININE mg/dL 0.90   EGFR mL/min/1.73m*2 62   GLUCOSE mg/dL 102*   CALCIUM mg/dL 9.8       Lab Results   Component Value Date    URICACID 4.0 04/24/2023       No components found for: \"[id number]\"      No results found for: \"HGBA1C\"      Lab Results   Component Value Date    CALCIUM 9.8 10/04/2024    PHOS 3.5 03/14/2024         No results found for requested labs within last 365 days.      Albumin/Creatinine Ratio   Date Value Ref Range Status   11/03/2023 32.4 ug/mg Creat Final     Albumin/Creatine Ratio   Date Value Ref Range Status   04/24/2023 25.0 0.0 - 30.0 ug/mg crt Final   08/22/2022 54.2 (H) 0.0 - 30.0 ug/mg crt Final             Assessment and Plan     Ms Parr is a 85y/o/c/f w PMH of  Ca s/p surgery 2015, Anemia, HTN, HLD, gout, hypothyroid, prior COVID 19 infection, mitral valve calcification abd CKD who is coming today as CKD follow-up    Impression      # Chronic kidney disease - G3b A1-now improved to G3A/A1-  - Baseline SCr 1.3-1.4, GFR 30-40- most recent serum creatinine is 0.9, GFR above 60 in October 2024- Most likely related to ASCVD  - Urine dipstick in office earlier showed no Albumin, no blood, positive asymptomatic LE. Spot test albumin creatinine ratio 32 mg/mg  -Kidney imaging done in February 2021 was normal size kidneys bilateral and no blockage or obstructions no masses  - Lyes : Within " normal sodium, potassium and no significant acidosis     # BP - today at office is above her home average at home? Whitecoat syndrome,negative orthostatic vitals   - average home reading 130/70s  - Current meds: Carvedilol 25 mgx2, Losartan 50 mg, amlodipine 2.5 mg-positive leg swelling  - No Changes. Instructed to bring blood pressure log with her next visit  -Repeat blood work before she left went down to 180/90        #Anemia Hb ~ 8 improved to 11.5-12 and has been stable-a symptomatic. Anemia is CKD related and in part iron deficiency anemia.She refused BM biopsy. She had remote GIB 20 yrs ago. She underwent IV iron infusion  -Repeat iron storage-improved. Continue p.o. iron. No indication for MORGAN at this time  -Kappa/lambda ratio is elevated 1.9 accepted to her GFR.  We will continue to monitor     #BMD   - wnl Ca, Phos, VIT D, PTH     # CVS  - On statins        #Others  - No NSAIDs, no contrast as possible. If to be done- we recommend holding ACEi/ARBS/diuretics 24 hrs prior to contrast exposure and ensure appropriate hydration   - Ensure well hydration  - Limit salt in diet  - No smoking         f/u in 12 months         Mari Pierre MD, MS, JAYY PAIGE  Clinical  - [employer]  Nephrologist - [facility]

## 2024-10-18 ENCOUNTER — APPOINTMENT (OUTPATIENT)
Dept: RADIOLOGY | Facility: HOSPITAL | Age: 84
End: 2024-10-18
Payer: MEDICARE

## 2024-10-21 DIAGNOSIS — E78.5 DYSLIPIDEMIA: ICD-10-CM

## 2024-10-22 RX ORDER — ATORVASTATIN CALCIUM 20 MG/1
20 TABLET, FILM COATED ORAL NIGHTLY
Qty: 90 TABLET | Refills: 0 | Status: SHIPPED | OUTPATIENT
Start: 2024-10-22

## 2024-10-28 ENCOUNTER — APPOINTMENT (OUTPATIENT)
Dept: PRIMARY CARE | Facility: CLINIC | Age: 84
End: 2024-10-28
Payer: MEDICARE

## 2024-10-28 VITALS
SYSTOLIC BLOOD PRESSURE: 120 MMHG | WEIGHT: 168.8 LBS | HEART RATE: 73 BPM | BODY MASS INDEX: 29.9 KG/M2 | TEMPERATURE: 96.8 F | OXYGEN SATURATION: 96 % | DIASTOLIC BLOOD PRESSURE: 75 MMHG

## 2024-10-28 DIAGNOSIS — E78.00 HYPERCHOLESTEREMIA: ICD-10-CM

## 2024-10-28 DIAGNOSIS — I10 BENIGN ESSENTIAL HYPERTENSION: ICD-10-CM

## 2024-10-28 DIAGNOSIS — E78.5 DYSLIPIDEMIA: ICD-10-CM

## 2024-10-28 DIAGNOSIS — E61.1 IRON DEFICIENCY: ICD-10-CM

## 2024-10-28 DIAGNOSIS — I12.9 STAGE 3 CHRONIC KIDNEY DISEASE DUE TO BENIGN HYPERTENSION (MULTI): ICD-10-CM

## 2024-10-28 DIAGNOSIS — E03.9 HYPOTHYROIDISM, UNSPECIFIED TYPE: Primary | ICD-10-CM

## 2024-10-28 DIAGNOSIS — N18.30 STAGE 3 CHRONIC KIDNEY DISEASE DUE TO BENIGN HYPERTENSION (MULTI): ICD-10-CM

## 2024-10-28 PROCEDURE — 1160F RVW MEDS BY RX/DR IN RCRD: CPT | Performed by: INTERNAL MEDICINE

## 2024-10-28 PROCEDURE — 1036F TOBACCO NON-USER: CPT | Performed by: INTERNAL MEDICINE

## 2024-10-28 PROCEDURE — 1159F MED LIST DOCD IN RCRD: CPT | Performed by: INTERNAL MEDICINE

## 2024-10-28 PROCEDURE — 3074F SYST BP LT 130 MM HG: CPT | Performed by: INTERNAL MEDICINE

## 2024-10-28 PROCEDURE — 99214 OFFICE O/P EST MOD 30 MIN: CPT | Performed by: INTERNAL MEDICINE

## 2024-10-28 PROCEDURE — 3078F DIAST BP <80 MM HG: CPT | Performed by: INTERNAL MEDICINE

## 2024-10-28 PROCEDURE — G2211 COMPLEX E/M VISIT ADD ON: HCPCS | Performed by: INTERNAL MEDICINE

## 2024-10-28 PROCEDURE — 1157F ADVNC CARE PLAN IN RCRD: CPT | Performed by: INTERNAL MEDICINE

## 2024-10-28 ASSESSMENT — ENCOUNTER SYMPTOMS
DIARRHEA: 0
BRUISES/BLEEDS EASILY: 0
SINUS PAIN: 0
ARTHRALGIAS: 0
SORE THROAT: 0
HEADACHES: 0
FATIGUE: 0
DIFFICULTY URINATING: 0
DIZZINESS: 0
FEVER: 0
UNEXPECTED WEIGHT CHANGE: 0
PALPITATIONS: 0
COUGH: 0
BLOOD IN STOOL: 0
ABDOMINAL PAIN: 0
WHEEZING: 0
HYPERTENSION: 1

## 2024-10-29 DIAGNOSIS — M10.9 GOUT, UNSPECIFIED CAUSE, UNSPECIFIED CHRONICITY, UNSPECIFIED SITE: ICD-10-CM

## 2024-10-29 RX ORDER — ALLOPURINOL 100 MG/1
200 TABLET ORAL DAILY
Qty: 180 TABLET | Refills: 1 | Status: SHIPPED | OUTPATIENT
Start: 2024-10-29

## 2024-11-11 ENCOUNTER — APPOINTMENT (OUTPATIENT)
Dept: NEPHROLOGY | Facility: CLINIC | Age: 84
End: 2024-11-11
Payer: MEDICARE

## 2024-11-22 NOTE — PROGRESS NOTES
Interval History:    Patient is an 83 -year-old white female previously seen by Dr. Keller, seen by me for the first time on 12/22/2020, for history of s tage I ER/AL positive and HER-2/garry negative right breast cancer status post lumpectomy 5/2016 followed by adjuvant Arimidex starting 6/2016  which she tolerated without side effects and was supposed to complete a 5-year course 6/2021 but decided to finish out her last prescription which  was done 9/2021  (did not feel any different off the anastrozole), adjuvant radiation not recommended per tumor board, also a history of  anemia due to renal dysfunction and iron deficiency status  post IV iron but could not rule out MDS given elevated MCV (patient refused bone marrow biopsy) , October 2020 noted to have an elevated methylmalonic acid and B12 only 300s and she was started on oral B12 by PCP 11/2020  which was the first time she ever took B12, 12/2020 started on folic acid because of an elevated reticulocyte count and B6 for a borderline  level.     She received Feraheme in June 2020, March/April 2021 ordered by her nephrologist, 9/2021 ordered by me, again February-March 2023-she  felt that this helped her energy.   7/2020 DEXA scan was normal.   10/2022 bilateral mammogram recommended 1 year [id number] has a scheduled for October 2023.    5/2021 PCP ordered  a follow-up CT chest-patient does not have an appointment for that yet with last CT chest 2/2021.  10/26/2020 PCP recommended restarting oral iron  and she did this, no side effects, has intermittently taken oral iron since 2017/2018, continues to take this at 1 tablet/day.  She has hypothyroidism, managed by PCP .  She  was seen by  PCP 6/26/2023 for chronic medical issues, follow-up in 3 months.    She saw nephrology on 2/8/2021 for chronic  kidney disease, anemia, hypertension-follow-up renal ultrasound no evidence of disease 2/18/2021-patient saw nephrology most recently 4/24/2023 with no need for  Aranesp at that time, follow-up in 6 months (11/16/2023).   Patient last had a transfusion in her 50s,  have been considering if hemoglobin less than 7 but that has not been the case recently.  1/2021 patient had eosinophilia, only new medications were vitamin B12, B6, folate, did not have any allergy symptoms, did not try any Claritin, better 4/2021, eosinophils  again elevated 8/2022 on labs by another provider with no significant allergy symptoms, had a candidiasis rash under her breasts, declines further evaluation.  9/2022 I gave her nystatin topical for her breast candidiasis which helps, uses intermittently.   She has a history of Harp's, started taking omeprazole 12/2021, saw Dr. Hood 1/10/2022 with dysphagia/GERD recommending continue PPI and diet modification, follow-up if symptoms persist, no plan for endoscopy, symptoms subsequently improved.  She  lives with her , is  active and can do chores, has a card group playing [employer]um[employer] once a month.  She is accompanied by her  today.  She has canceled and rescheduled some of her appointments at times with me, for example most recently 6/2022  and 12/12/2022.     She received the flu vaccine in September/October 2022 and is planning on getting this soon.  10/2/2023 she declined further COVID vaccines, did get a booster most recently around January 2023.     I recommended ongoing vaccines and COVID measures through PCP 10/2/2023.  No other  hospitalization, major illness, or procedure since her last visit on 5/23/2023.  I have reviewed the available laboratory data, radiographic  data, medications, and notes, and have summarized them in this note 10/2/2023.      No fevers, hot flashes, unintentional weight loss, headaches, sore throat, acute vision changes, chest pain, edema now,  shortness breath, cough, nausea or vomiting, abnormal bowel movements, abdominal pain, blood in stool, dysuria or hematuria, bone/back/muscle/joint pain  now, numbness,  focal weakness, tingling, lumps, abnormal bruising or bleeding, diabetes but elevated glucose noted in chart, anxiety or depression, breast masses, vaginal discharge or bleeding.     Hematology-oncology history (reviewed and updated 10/2/2023)  -[id number] hemoglobin 11s  -[id number]: Hemoglobin 11s-12s, B12 300s  -2014: Hemoglobin 10.7-11.7, iron 17%, ferritin 85  -2015: Hemoglobin 10.9, B12 400s  -2016: Normal WBC and platelets, hemoglobin 10.8-11, MCV 97-99, negative SPEP, normal LDH, B12 300s  -4/13/2016 right breast biopsy at 9:00 showed invasive carcinoma with ductal and lobular features, grade 1, ER positive greater than 95%, [place] +30%, negative HER-2/garry  -5/10/2016 right breast lumpectomy and sentinel lymph node biopsy showed 3 sentinel lymph nodes negative, right lumpectomy with invasive carcinoma ductal and lobular features, 1.2 cm, grade 1, low-grade DCIS, no lymphovascular invasion, negative margins;  pT1cN0  -5/2016 breast tumor board noted that she had cT1aN0 breast cancer but pT1c, radiation not recommended but hormonal therapy recommended.  -2017: Normal WBC and platelets, hemoglobin 11s down to 8.6 after hip surgery, MCV , negative TTG, B12 300s  -2018: WBC up to 15.3, hemoglobin 9.1, , normal platelets.  Erythropoietin 25.6.  Negative stool occult.  Iron 6%, ferritin 266, normal folate, B12 410, reticulocyte count 2.4  -12/2018 patient was seen by Dr. Hood noting bleeding ulcer in the past, Celestine's ulcers in 9/2014, colonoscopy done in 9/2014  -1/2019 EGD showed Z-line variable, hiatal hernia, no ulcers; pathology showed mild reactive gastropathy, distal esophagus with intestinal metaplasia without dysplasia consistent with Harp's esophagus  -2019: Normal WBC and platelets, hemoglobin 8.4-9.9, MCV .  Creatinine 1.19.  Ferritin 120s-412, iron 14-26%.  -February and May 2020: WBC 7s, hemoglobin 9.3-9.6, , platelet 196-244, mildly elevated neutrophils 0.47.  Iron 19-20%,  ferritin 156-357.  -7/2020 DEXA scan was normal  -10/2020: WBC 7.6, hemoglobin 7.7, , platelets 237, elevated eosinophils 650.  Creatinine 2.39-2.18, potassium 5.7-6, normal calcium, normal LFTs, normal TSH.  Homocystine elevated 29.  Normal folate 14.8.  Iron 21%.  B12 300s, elevated methylmalonic  acid 516.  -10/26/2020 PCP recommended restarting oral iron.  -11/2020: WBC 9.6, hemoglobin 7.5, , platelets 250s, elevated neutrophils, elevated monocytes 0.92.  Normal sodium and potassium, creatinine 1.39, normal calcium, glucose 131, positive Covid.  -I initially saw the patient on 12/22/2020, previously seen by Dr. Keller, for history of stage I ER/SD positive and HER-2/garry negative right breast cancer status post lumpectomy 5/2016 followed by adjuvant Arimidex starting 6/2016, adjuvant radiation  not recommended per tumor board, also a history of anemia due to renal dysfunction and iron deficiency status post IV iron but could not rule out MDS given elevated MCV.  Her breast cancer was found in the setting of an abnormal screening mammogram.   EGD and colonoscopy were negative for bleeding or malignancy 8/2013 according to notes, did have 2014 colonoscopy and EGD noted above, also EGD 1/2019; patient said she had bleeding ulcers when  she was in her 50s requiring transfusion but no transfusion since then, and that her colonoscopy 9/2014 was okay.  Patient received Feraheme 510 mg x 2 doses in February-March 2019, June 2019, December 2019, June 2020.  On review of labs, I noted her B12 level was borderline-she started taking oral B12 from PCP 11/2020 which was the first time she ever took B12, never took B12  injections.  She had intermittently been on oral iron since 2017/2018, restarted this most recently 10/2022 tablets/day.  She never took folate.  She was anemic for several years.  She did eat red meat.  She denied any bleeding except her bleeding ulcers  in her 50s requiring transfusion.  She  had pica for ice in her 50s but since then none.  No blood donation.  Her previous oncologist discussed Procrit but as she felt well and was not overly  enthusiastic according to his notes, he had a high threshold for initiated Procrit treatment by mutual agreement.     -12/22/2020: WBC 7.7, hemoglobin 7.9, , platelet 321, AEC 0.53.  Normal copper 127, folate 11, iron 22%, TIBC low, ferritin 262, normal methylmalonic acid 294, negative intrinsic factor antibody and parietal cell antibody, negative hepatitis panel,  negative celiac panel.  Normal LDH.  Reticulocyte count 3.2%.  B12 greater than 2000.  B6 was low at 12.6.  Vitamin D normal at 35.  Homocystine was 14 which was lower. Folic acid was recommended for elevated reticulocyte count and B6 for low level.   -1/2021 bone marrow biopsy was recommended but patient refused, noted eosinophilia with no new medications except for vitamins and no allergy symptoms.   -1/2021: WBC 8-10s, hemoglobin 7.8-8.4, -105, platelet 200s, elevated neutrophils, elevated eosinophils up to 1.8s then down to 1.5s.  -2/2/2021 CT chest with no effusion, severe mitral valve calcification, large hiatal hernia, 2 mm nodule right lower lobe, atelectasis.   -2/2021: WBC 7.8, hemoglobin 8.1, , platelet 251, eosinophils 0.66.  Iron 9% with ferritin 144, normal PTH intact 73.   -3/2021: WBC 6.7-9.0, hemoglobin 7.1-8.5, MCV , platelet 200s.  Iron 10%, ferritin 76.  Eosinophils 0.62-0.54, elevated neutrophils.  -Patient received Feraheme 3/31/2021 and 4/7/2021 by nephrology.   -4/7/2021, 4/19/2021: WBC 7.4-8.5, hemoglobin 7.8 up to 8.6, -104, platelet 235-261.  Eosinophils 0.76 down to 0.59.   -4/2021: Negative UPEP, SPEP, HIV.  Zinc was 105.  B6 was elevated 295-her dose was decreased from 50 mg daily down to 25 mg daily.  B1 normal 103.  B12 elevated.  Reticulocyte count 2.0%.  Kappa light chain elevated 7.26, lambda 3.21, ratio 2.26, her  first light chains  ever.  Iron 23% with ferritin 664.  Erythropoietin 13.8.  Negative GISSELL/GISSELL.   -5/2021: WBC 7.9, hemoglobin 9.3, , platelet 237, eosinophils 0.88.   -6/4/2021: WBC 7.7, hemoglobin 9.4, , platelet 237, eosinophils 0.67.   -8/9/2021: WBC 8.1, hemoglobin 9.6, , platelet 250.  Iron 15% with ferritin 214.  Normal sodium and potassium, creatinine 1.4, calcium 9.9.   -9/2021 patient finished her anastrozole   -9/2021 Feraheme 510 mg x 2 doses were given.   -10/1/2021: Normal sodium and potassium, creatinine 1.3 stable, calcium 10.2, normal LFTs, normal lipids, normal TSH   -12/14/2021: WBC 6.6, hemoglobin 9.8 improved, , platelet 220, ANC 4.08, eosinophils 0.53 not significant.  Iron 21% with ferritin 676.  Elevated B12.  Normal homocystine 10.  B6 elevated 255.  Kappa 6.36, lambda 3.39, ratio 1.88, overall improved  versus April 2021.  -2/18/2022: WBC 9.0, hemoglobin improved 11.1, , platelet 235.  Normal sodium, potassium 4.1, creatinine 1.2 stable, calcium 10.1.  Normal phosphorus.  Magnesium 1.6.  Iron 24% with ferritin 512 which was slightly lower.  PTH intact normal 73.   -8/22/2022: WBC 9.2, hemoglobin better 11.3,  stable, platelets 218, eosinophils 1.53.  Normal sodium and potassium, creatinine 1.1, normal calcium.  Magnesium 1.6.  Normal phosphorus and LFTs.  Iron 29% with ferritin stable 694.  B12 elevated.   Normal TSH.  Vitamin D was 40.   -September-October 2022: WBC 11.8 down to 9.0, hemoglobin 11.0-11.1, -104, platelets 200 range, ANC 8.3 down to 5.1.  Eosinophils 1.07-1.17.  Eosinophilia was better.   -2/20/2023: WBC 9.5, hemoglobin 11.2, , platelet 242, eosinophils better 0.96.  Elevated neutrophils.  Iron 17% with ferritin 593 status post Feraheme 2/24 and 3/3 .  Elevated B12.  B6 elevated 276, told her she could decrease by half.  Reticulocyte count 1.6.  Kappa 6.72, lambda 3.49, ratio 1.93 overall  stable.  I told her she could go down to iron 1  tablet/day as 2 tablets/day are likely not absorbed any better.   -4/24/23: WBC 9.2, hemoglobin 11.5, , platelet 242, no differential.  Normal sodium, potassium 4.1, creatinine 1.2, calcium 10.1.  Normal phosphorus and uric acid.  Normal iron 42% with ferritin elevated 1121.     Past medical history  -Breast cancer as above  -Anemia with macrocytosis and iron deficiency as above, borderline B12  level as above started on supplement 11/2020, elevated homocystine as above , B6 deficiency 12/2020  -Chronic kidney disease.  2/18/2021 renal ultrasound showed right kidney 9 cm, left kidney 8.6 cm, no hydronephrosis,  nonspecific mild circumferential urinary bladder wall could be under distention.  -Lower extremity edema secondary to chronic kidney disease  -History of bleeding ulcer  in her 50s requiring transfusion, Celestine's ulcers in 9/2014, colonoscopy 9/2014, EGD 1/2019 with hiatal hernia but no ulcers and Harp's esophagus, history of colitis  -GERD , subsequently resolved  -Hypothyroidism  -Gout  -Hypertension.  Amlodipine was added June 2022.  -Hyperlipidemia  -History of hyperkalemia   -Elevated glucose  -Shoulder dislocation  -COVID 11/2020 managed as an outpatient.  Chest x-ray 10/2020 showed large hiatal hernia,  trace effusions versus pleural thickening, COPD.  2/2/2021 CT chest showed no effusion, severe mitral valve calcification, large hiatal hernia, 2 mm nodule right lower lobe, atelectasis.   -Vitamin D deficiency   -Eosinophilia   -Cutaneous candidiasis     Past surgical history  Right hip 6/2017, cataracts, right breast lumpectomy     Social history  No tobacco, alcohol, or drugs.     Family history  No blood disorders or cancers in first-degree relatives  except sister with breast cancer diagnosed at age 78.      No Known Allergies     Current Outpatient Medications on File Prior to Visit   Medication Sig Dispense Refill    Vitamin B-6 25 mg tablet Take 1 tablet (25 mg) by mouth once daily.    "   allopurinol (Zyloprim) 100 mg tablet TAKE 2 TABLETS BY MOUTH DAILY 180 tablet 1    amLODIPine (Norvasc) 2.5 mg tablet TAKE 1 TABLET BY MOUTH ONCE  DAILY 90 tablet 1    aspirin 81 mg EC tablet Take 1 tablet (81 mg) by mouth once daily.      atorvastatin (Lipitor) 20 mg tablet TAKE 1 TABLET BY MOUTH ONCE  DAILY 90 tablet 1    carvedilol (Coreg) 25 mg tablet TAKE 1 TABLET BY MOUTH IN THE  MORNING AND 1 TABLET IN THE  EVENING WITH MEALS 180 tablet 1    cholecalciferol (Vitamin D-3) 125 MCG (5000 UT) capsule Take 1 capsule (125 mcg) by mouth once daily.      cyanocobalamin, vitamin B-12, 5,000 mcg tablet, sublingual Place 1 tablet under the tongue 1 (one) time each day.      ferrous sulfate 325 (65 Fe) MG tablet Take 1 tablet (325 mg) by mouth 2 times a day.      folic acid (Folvite) 1 mg tablet TAKE 1 TABLET BY MOUTH ONCE  DAILY 90 tablet 1    levothyroxine (Synthroid, Levoxyl) 50 mcg tablet TAKE 1 TABLET BY MOUTH DAILY 90 tablet 1    losartan (Cozaar) 100 mg tablet TAKE ONE-HALF TABLET BY MOUTH  ONCE DAILY 45 tablet 1    omeprazole (PriLOSEC) 20 mg DR capsule Take 1 capsule (20 mg) by mouth once daily. Before eating      [DISCONTINUED] allopurinol (Zyloprim) 100 mg tablet TAKE 2 TABLETS BY MOUTH DAILY 180 tablet 0    [DISCONTINUED] levothyroxine (Synthroid, Levoxyl) 50 mcg tablet TAKE 1 TABLET BY MOUTH DAILY 90 tablet 0     No current facility-administered medications on file prior to visit.            Vitals:    10/02/23 0923   BP: 167/68   Pulse: 67   Resp: 18   Temp: 36.8 °C (98.2 °F)   SpO2: 98%      Physical Exam:      Constitutional: Performance status 0-1.  82.3 Kg.  -General: Well-developed and well-nourished, elderly but looks very good for her age. No acute distress.  -Lymphatics: No cervical or supraclavicular or axillary lymphadenopathy.  -Eyes:   Anicteric.  -HEENT: Upper dentures.  Lower dentition intact.  -Breast: Patient declined examination 10/2/2023 but on 2/20/2023 was as follows: \"Need for " "chaperone discussed with patient in the physical exam space-patient consented.  Chaperone and other persons present for physical exam included the following: Medical  assistant Kiara and patient's   Bilateral breasts with no masses.  Cutaneous candidiasis under her left with no significant skin breakdown.\"     -Cardiovascular: Regular rate and rhythm.    -Respiratory: Clear to auscultation bilaterally. Breathing is nonlabored.  -Abdomen: Soft, nontender, limited by body positioning and body habitus.  -Back: No costovertebral angle tenderness. No spine tenderness.  -Extremities: Trace-1+ bilateral leg edema, more of a generalized puffiness-overall stable.    -Neurologic: Awake, alert, and oriented. Speech fluent with normal content.     -Skin: Warm and dry distally.    -Psychiatric: Appropriate, cooperative, and very pleasant.      Assessment and Plan:      #Right breast grade 1 invasive carcinoma with ductal and lobular features ER/VA positive and HER-2/garry negative  status post lumpectomy 5/2016, 1.2 cm with negative lymph nodes and negative margins, pT1CN0, tumor board recommended no adjuvant chemotherapy or radiation, started on Arimidex 6/2016 well-tolerated.  12/22/2020 a bone agent like Prolia or Zometa was  discussed and she declined.  She completed 5 years of Arimidex 6/2021 but wanted to finish out the bottle that she had renewed, completed this 9/2021 .    No evidence of disease.     #Cutaneous candidiasis under her breast, improved with nystatin     #Macrocytic anemia with normal WBC and platelets for the most part, worse November 2020 but might have been due to Covid infection but also  had worsening renal dysfunction in October 2020, anemia in part secondary to renal dysfunction and iron deficiency status post IV iron although  ferritin 1465-1087 greater than 100 so not frankly deficient, cannot rule out something like MDS especially with a macrocytosis but patient refused a bone marrow biopsy , " also history of borderline B12 level with elevated MMA 10/2020 which can be elevated due to chronic kidney disease but also due to underlying  B12 deficiency (B12 deficiency would explain the macrocytosis but normal methylmalonic acid 12/2020 with persistent macrocytosis, negative intrinsic factor and parietal cell antibody, started on oral B12 11/2020), history of Celestine's ulcers in 2014 but  no ulcer on 1/2019 and colonoscopy 2014 apparently unremarkable.   She did have a bleeding ulcer requiring transfusion in her 50s.  10/2020 PCP recommended restarting oral iron, on this intermittently since 2017/2018, no side effects, February-March 2021 iron saturation dropped and she received IV iron again March-April 2021 from her nephrologist, also again 9/2021 with subsequent improvement in iron studies and mild improvement in hemoglobin 12/2021.    Procrit was considered by her former oncologist  but patient was feeling well and was not overly enthusiastic about this medication so did not get this.  2016 normal LDH and SPEP.  2018 normal folate, erythropoietin 25, negative stool occult.   12/2020 B6 was low and she was started on supplement, might have accounted for her elevated homocystine and her macrocytic anemia but not significantly low, normal to elevated B12, iron, folate, copper;  reticulocyte count was 3.2% so she did have some bone marrow reserve, started on folic acid.  Negative celiac 12/2020 .  She was relatively asymptomatic from her anemia even with hemoglobin down to 7s in March-April 2021 but actually got better from the IV iron.  4/2021 negative UPEP, SPEP, HIV, GISSELL; erythropoietin 13.8; no deficiency in zinc/B6/B1/B12/iron at that time;  light chains minimally abnormal, likely not significant especially as light chains were somewhat improved December 2021.     8/2022 hemoglobin stable 11s with stable macrocytosis, ferritin elevated but iron normal consistent with anemia of chronic disease.   September-October 2022 mild leukocytosis subsequently normalized.  February 2023 iron 17% with ferritin 593 status post  IV iron, elevated B12, light chains stable, hemoglobin 11 range with normal WBC and platelets, B6 elevated on supplementation, reticulocyte count 1.6%.  April 2023 hemoglobin stable/improved with no iron deficiency.   NEGATIVE HEPATITIS PANEL 2020.      #Iron and B12 and B6 deficiency as above.  4/2021 B6 was elevated so her dose was decreased by half.  B12, B6, iron studies not low on most recent labs.     #Elevated homocystine 10/2020 possibly due to B12/B6 deficiency, better 12/22/2020 although still mildly elevated, normal 12/2021     #Eosinophilia December 2020 and especially 1/2021 with AEC up to 1.8, subsequently down a little bit, only new medications were vitamin B12/B6/folate,  no allergy symptoms, asymptomatic, better March-April 2021.  May 2021 eosinophils 0.88, improved to June 2021 at 0.67, 0.5 in December 2021, back up to 1.53 in 8/2022 with no new changes in medications or symptoms except amlodipine started 6/2022 .    Eosinophils elevated 1.07-1.17 in September-October 2022 but better from prior.  She could have eosinophilia intermittently from cutaneous candidiasis.  Eosinophils less than 1000 on most recent labs.     #Hypertension with blood pressure elevated again 10/2/23     #History of vitamin D deficiency with normal level on most recent labs     #Chronic kidney disease with chronic lower extremity edema, followed by nephrology.  Renal ultrasound no acute process 2/18/2021.     #1/2019 hiatal hernia and Harp's esophagus, history of Celestine's ulcers in 2014, history of bleeding ulcer, history of colitis, GERD.  12/2021 she started on a PPI with chronic GI issues improved.     #Hypothyroidism, gout, hyperlipidemia, history of hyperkalemia, elevated glucose, shoulder dislocation     #Covid infection 11/2020 managed as an outpatient.  10/2020 chest x-ray shows a large hiatal  hernia, trace effusions versus pleural thickening, COPD.  2/2/2021 CT chest with no effusion but severe mitral valve calcification, large hiatal hernia, few  millimeter nodule right lower lobe.     #Family history of sister with breast cancer.  Genetics consultation was discussed 12/22/2020 and patient declined.     -Nystatin powder was prescribed 9/12/2022, asked my office to refill 10/2/2023.  -Patient will continue on oral B12 (gets from PCP) with injections as needed and oral iron with levels monitored  -2/20/2023 I told her she could  go down to 1 iron tablet daily unlikely better absorption with higher dosage.  Parental supplement can be given as needed.    -Continue B6 25 mg daily , has not needed a prescription, level elevated February 2023 which is okay as this can be excreted in the urine.    -Continue folic acid for elevated reticulocyte count 1 mg daily #30 with 11 refills  which was refilled on 12/21/2021, monitor reticulocyte count periodically (not elevated February 2023), has been getting this so not sure if she has this from her PCP but confirms she did get it.  -CBC with differential and iron studies, B12, B6 ordered today.  PCP also has labs including a vitamin D, TSH, lipid panel, CMP, CBC which I advised her to let the lab know about to make sure that all labs get done.  Light chains were stable 2/2023 and reticulocyte count checked 2/2023, check  periodically, consider every 6-12 months. Again 10/2/2023 patient declined an aggressive about her evaluation for her eosinophilia or a bone marrow biopsy.   CMP and vitamin D are deferred to PCP  or nephrology, and homocystine can be monitored as needed, deferred by me as it would not change my management necessarily.  Other things to consider would be stool occult, H. pylori, other hemolysis  labs, bone marrow biopsy, abdominal imaging, urine analysis to check for blood loss, peripheral blood flow cytometry leukemia/lymphoma and PNH.    -Feraheme can  be given as needed if low iron saturation, 510 mg x 2 given 1 week apart. Side effects of Feraheme were explained and outlined in  my note on 12/22/2020-patient agrees to proceed as needed.  -Transfuse if hemoglobin less than 7-risks and benefits discussed on 12/22/2020 and patient agrees to proceed if needed.  -I recommended ongoing endoscopy given her history of Harp's esophagus, again-she saw Dr. Hood who recommended PPI continue 1/2022, no plans for endoscopy at this time.  -An erythropoietin agent has been considered by her former oncologist, would need to have adequate iron studies, ideally would do a bone marrow biopsy prior to starting this to rule out MDS but she declined, and order an erythropoietin level prior to  starting which was done 4/2021.  Nephrology appears to be planning on ordering this if her iron studies are repleted depending on her hemoglobin, has not needed this.  8/19/2021 I discussed an erythropoietin agent and patient declined given hemoglobin  of 9s and feeling well.  Procrit could likely start at 10,000 units subcut weekly prn H/H <10/30, verify iron >20% and ferritin >100, side effects outlined in my note on  2/8/2021, when appropriate.   -DEXA scan was done 7/2020 and can be followed up in 2 years, now deferring to PCP as she is off Arimidex.   Mammogram 10/2022 bilateral recommended follow-up in 1 year-ordered for October 2023 and she verified this is pending.  Calcium and vitamin D were encouraged if no contraindication.  Self exams, family screening, healthy lifestyle  with diet and exercise with optimization of weight, limiting alcohol were encouraged.  I have told her not to take any estrogen-based products.  A bone agent such as Prolia or Zometa was discussed 12/22/2020 and she declined.  Breast exam can be monitored  -patient declined examination 10/2/2023 so consider next visit.  -I recommended follow-up CT chest through PCP again 10/2/2023.   -I again requested blood  pressure to be repeated and notify PCP if still elevated.  -Labs are incorporated in my note which is to be sent to PCP. I have recommended routine health care maintenance and screening through PCP. The patient was reminded to followup with the PCP for other medical problems and ongoing care. The patient was  asked to return to clinic to see me, or sooner as needed for new or concerning symptoms, in 4 mo.    hide

## 2024-11-28 DIAGNOSIS — D64.9 ANEMIA, UNSPECIFIED TYPE: ICD-10-CM

## 2024-12-01 RX ORDER — FOLIC ACID 1 MG/1
TABLET ORAL
Qty: 90 TABLET | Refills: 0 | Status: SHIPPED | OUTPATIENT
Start: 2024-12-01

## 2024-12-04 DIAGNOSIS — E78.5 DYSLIPIDEMIA: ICD-10-CM

## 2024-12-05 DIAGNOSIS — I10 BENIGN ESSENTIAL HYPERTENSION: ICD-10-CM

## 2024-12-05 RX ORDER — ATORVASTATIN CALCIUM 20 MG/1
20 TABLET, FILM COATED ORAL NIGHTLY
Qty: 90 TABLET | Refills: 1 | Status: SHIPPED | OUTPATIENT
Start: 2024-12-05

## 2024-12-07 RX ORDER — AMLODIPINE BESYLATE 2.5 MG/1
2.5 TABLET ORAL
Qty: 90 TABLET | Refills: 1 | Status: SHIPPED | OUTPATIENT
Start: 2024-12-07

## 2024-12-29 DIAGNOSIS — K21.9 GERD WITHOUT ESOPHAGITIS: ICD-10-CM

## 2024-12-31 RX ORDER — OMEPRAZOLE 20 MG/1
CAPSULE, DELAYED RELEASE ORAL
Qty: 90 CAPSULE | Refills: 1 | Status: SHIPPED | OUTPATIENT
Start: 2024-12-31

## 2025-01-27 ENCOUNTER — TELEMEDICINE (OUTPATIENT)
Dept: PRIMARY CARE | Facility: CLINIC | Age: 85
End: 2025-01-27
Payer: MEDICARE

## 2025-01-27 DIAGNOSIS — N18.31 CHRONIC KIDNEY DISEASE, STAGE 3A (MULTI): ICD-10-CM

## 2025-01-27 DIAGNOSIS — C50.919 MALIGNANT NEOPLASM OF FEMALE BREAST, UNSPECIFIED ESTROGEN RECEPTOR STATUS, UNSPECIFIED LATERALITY, UNSPECIFIED SITE OF BREAST: ICD-10-CM

## 2025-01-27 DIAGNOSIS — I49.5 SICK SINUS SYNDROME (MULTI): ICD-10-CM

## 2025-01-27 DIAGNOSIS — J11.1 FLU: Primary | ICD-10-CM

## 2025-01-27 PROCEDURE — 1157F ADVNC CARE PLAN IN RCRD: CPT | Performed by: INTERNAL MEDICINE

## 2025-01-27 PROCEDURE — 1036F TOBACCO NON-USER: CPT | Performed by: INTERNAL MEDICINE

## 2025-01-27 PROCEDURE — 99213 OFFICE O/P EST LOW 20 MIN: CPT | Performed by: INTERNAL MEDICINE

## 2025-01-27 PROCEDURE — 1159F MED LIST DOCD IN RCRD: CPT | Performed by: INTERNAL MEDICINE

## 2025-01-27 PROCEDURE — 1160F RVW MEDS BY RX/DR IN RCRD: CPT | Performed by: INTERNAL MEDICINE

## 2025-01-27 RX ORDER — OSELTAMIVIR PHOSPHATE 75 MG/1
75 CAPSULE ORAL 2 TIMES DAILY
Qty: 10 CAPSULE | Refills: 0 | Status: SHIPPED | OUTPATIENT
Start: 2025-01-27 | End: 2025-02-01

## 2025-01-27 ASSESSMENT — ENCOUNTER SYMPTOMS
HEADACHES: 0
BRUISES/BLEEDS EASILY: 0
SORE THROAT: 0
PALPITATIONS: 0
ABDOMINAL PAIN: 0
WHEEZING: 0
UNEXPECTED WEIGHT CHANGE: 0
SINUS PAIN: 0
FEVER: 0
ARTHRALGIAS: 0
BLOOD IN STOOL: 0
FATIGUE: 1
DIARRHEA: 0
DIFFICULTY URINATING: 0
COUGH: 1
DIZZINESS: 0

## 2025-01-27 NOTE — PROGRESS NOTES
Subjective   Patient ID: Lilly Parr is a 84 y.o. female who presents for Virtual Visit (Flu like symptoms,  recently in hospital for flu ).    - Patient  recently admitted for influenza treated currently on Tamiflu patient started having congestion coughing body aches  -Patient counseled about the need to be treated for flu since she did not have the flu vaccine  Start patient on Tamiflu 75 mg twice a day  Increase fluid intake Tylenol as needed follow-up if no improvement  -Chronic kidney disease stable follow-up nephrology follow-up blood work as recommended avoid salt in diet avoid NSAIDs  - Hypertension improved continue on carvedilol amlodipine and losartan no medication side effect  - Status post pacemaker placement doing well no palpitations.  -Reflux disease symptoms continue with omeprazole 20 mg daily.  - Continue on aspirin vitamin D ferrous sulfate folic acid and vitamin B6.  - CT results are chronic lung nodule with benign lesion asymptomatic no need for further screening  No shortness of breath no coughing no wheezing continue monitor conservatively  -Chronic anemia continue with current vitamin B12 and vitamin B6 iron supplement anemia of chronic disease  - Patient declined bone marrow biopsy aware of risk and benefit continue monitor conservatively  --Chronic kidney disease, controlled continue with low-salt diet increase fluid intake no change continue monitoring conservatively patient scheduled to see nephrology again in September 2024  -Breast cancer in remission continue   -Hypothyroid continue the levothyroxine , compensated.             Review of Systems   Constitutional:  Positive for fatigue. Negative for fever and unexpected weight change.   HENT:  Positive for congestion. Negative for ear discharge, ear pain, mouth sores, sinus pain and sore throat.    Eyes:  Negative for visual disturbance.   Respiratory:  Positive for cough. Negative for wheezing.    Cardiovascular:   "Negative for chest pain, palpitations and leg swelling.   Gastrointestinal:  Negative for abdominal pain, blood in stool and diarrhea.   Genitourinary:  Negative for difficulty urinating.   Musculoskeletal:  Negative for arthralgias.   Skin:  Negative for rash.   Neurological:  Negative for dizziness and headaches.   Hematological:  Does not bruise/bleed easily.   Psychiatric/Behavioral:  Negative for behavioral problems.    All other systems reviewed and are negative.      Objective   No results found for: \"HGBA1C\"   There were no vitals taken for this visit.    Physical Exam  Constitutional:       General: She is not in acute distress.     Appearance: She is not ill-appearing, toxic-appearing or diaphoretic.   Neurological:      Mental Status: She is alert.   Psychiatric:         Mood and Affect: Mood normal.         Assessment/Plan   Lilly was seen today for virtual visit.  Diagnoses and all orders for this visit:  Flu (Primary)  -     oseltamivir (Tamiflu) 75 mg capsule; Take 1 capsule (75 mg) by mouth 2 times a day for 5 days.  Sick sinus syndrome (Multi)  Malignant neoplasm of female breast, unspecified estrogen receptor status, unspecified laterality, unspecified site of breast  Chronic kidney disease, stage 3a (Multi)   - Patient  recently admitted for influenza treated currently on Tamiflu patient started having congestion coughing body aches  -Patient counseled about the need to be treated for flu since she did not have the flu vaccine  Start patient on Tamiflu 75 mg twice a day  Increase fluid intake Tylenol as needed follow-up if no improvement  -Chronic kidney disease stable follow-up nephrology follow-up blood work as recommended avoid salt in diet avoid NSAIDs  - Hypertension improved continue on carvedilol amlodipine and losartan no medication side effect  - Status post pacemaker placement doing well no palpitations.  -Reflux disease symptoms continue with omeprazole 20 mg daily.  - Continue " on aspirin vitamin D ferrous sulfate folic acid and vitamin B6.  - CT results are chronic lung nodule with benign lesion asymptomatic no need for further screening  No shortness of breath no coughing no wheezing continue monitor conservatively  -Chronic anemia continue with current vitamin B12 and vitamin B6 iron supplement anemia of chronic disease  - Patient declined bone marrow biopsy aware of risk and benefit continue monitor conservatively  --Chronic kidney disease, controlled continue with low-salt diet increase fluid intake no change continue monitoring conservatively patient scheduled to see nephrology again in September 2024  -Breast cancer in remission continue   -Hypothyroid continue the levothyroxine , compensated.

## 2025-01-28 ENCOUNTER — APPOINTMENT (OUTPATIENT)
Dept: PRIMARY CARE | Facility: CLINIC | Age: 85
End: 2025-01-28
Payer: MEDICARE

## 2025-02-09 DIAGNOSIS — I10 BENIGN ESSENTIAL HYPERTENSION: ICD-10-CM

## 2025-02-10 RX ORDER — LOSARTAN POTASSIUM 50 MG/1
50 TABLET ORAL DAILY
Qty: 90 TABLET | Refills: 0 | Status: SHIPPED | OUTPATIENT
Start: 2025-02-10

## 2025-02-11 DIAGNOSIS — E03.9 HYPOTHYROIDISM, UNSPECIFIED TYPE: ICD-10-CM

## 2025-02-12 RX ORDER — LEVOTHYROXINE SODIUM 50 UG/1
50 TABLET ORAL
Qty: 90 TABLET | Refills: 0 | Status: SHIPPED | OUTPATIENT
Start: 2025-02-12

## 2025-02-21 DIAGNOSIS — D64.9 ANEMIA, UNSPECIFIED TYPE: ICD-10-CM

## 2025-02-24 RX ORDER — FOLIC ACID 1 MG/1
TABLET ORAL
Qty: 90 TABLET | Refills: 1 | Status: SHIPPED | OUTPATIENT
Start: 2025-02-24

## 2025-03-07 ENCOUNTER — TELEPHONE (OUTPATIENT)
Dept: HEMATOLOGY/ONCOLOGY | Facility: HOSPITAL | Age: 85
End: 2025-03-07
Payer: MEDICARE

## 2025-03-07 NOTE — TELEPHONE ENCOUNTER
Patient scheduled for MD follow up with Dr. Calvert on Thurs, 7/17/25. Due to a change in provider's schedule, this appointment needs to be rescheduled. Reached out to patient to reschedule. Patient agreeable. Rescheduled to Wednesday, 7/16/25 at 10:20 AM. Patient verbalized understanding and agreement regarding discussed information via verbal feedback.

## 2025-04-09 DIAGNOSIS — M10.9 GOUT, UNSPECIFIED CAUSE, UNSPECIFIED CHRONICITY, UNSPECIFIED SITE: ICD-10-CM

## 2025-04-10 RX ORDER — ALLOPURINOL 100 MG/1
200 TABLET ORAL DAILY
Qty: 180 TABLET | Refills: 0 | Status: SHIPPED | OUTPATIENT
Start: 2025-04-10

## 2025-04-15 DIAGNOSIS — E03.9 HYPOTHYROIDISM, UNSPECIFIED TYPE: ICD-10-CM

## 2025-04-16 RX ORDER — LEVOTHYROXINE SODIUM 50 UG/1
50 TABLET ORAL
Qty: 90 TABLET | Refills: 0 | Status: SHIPPED | OUTPATIENT
Start: 2025-04-16

## 2025-04-28 DIAGNOSIS — I10 BENIGN ESSENTIAL HYPERTENSION: ICD-10-CM

## 2025-04-28 RX ORDER — LOSARTAN POTASSIUM 50 MG/1
50 TABLET ORAL DAILY
Qty: 90 TABLET | Refills: 0 | Status: SHIPPED | OUTPATIENT
Start: 2025-04-28

## 2025-05-17 DIAGNOSIS — I10 BENIGN ESSENTIAL HYPERTENSION: ICD-10-CM

## 2025-05-19 RX ORDER — AMLODIPINE BESYLATE 2.5 MG/1
2.5 TABLET ORAL
Qty: 90 TABLET | Refills: 0 | Status: SHIPPED | OUTPATIENT
Start: 2025-05-19

## 2025-06-11 DIAGNOSIS — K21.9 GERD WITHOUT ESOPHAGITIS: ICD-10-CM

## 2025-06-11 DIAGNOSIS — M10.9 GOUT, UNSPECIFIED CAUSE, UNSPECIFIED CHRONICITY, UNSPECIFIED SITE: ICD-10-CM

## 2025-06-13 RX ORDER — OMEPRAZOLE 20 MG/1
CAPSULE, DELAYED RELEASE ORAL
Qty: 90 CAPSULE | Refills: 0 | Status: SHIPPED | OUTPATIENT
Start: 2025-06-13

## 2025-06-13 RX ORDER — ALLOPURINOL 100 MG/1
TABLET ORAL
Qty: 180 TABLET | Refills: 0 | Status: SHIPPED | OUTPATIENT
Start: 2025-06-13

## 2025-07-11 ENCOUNTER — TELEPHONE (OUTPATIENT)
Dept: HEMATOLOGY/ONCOLOGY | Facility: HOSPITAL | Age: 85
End: 2025-07-11
Payer: MEDICARE

## 2025-07-11 NOTE — TELEPHONE ENCOUNTER
Contacted patient, reminded to have lab work drawn prior to her visit with Dr. Calvert on 7/16/25. Patient verbalized understanding.

## 2025-07-14 ENCOUNTER — LAB (OUTPATIENT)
Dept: LAB | Facility: HOSPITAL | Age: 85
End: 2025-07-14
Payer: MEDICARE

## 2025-07-14 DIAGNOSIS — D64.9 ANEMIA, UNSPECIFIED TYPE: ICD-10-CM

## 2025-07-14 LAB
BASOPHILS # BLD AUTO: 0.08 X10*3/UL (ref 0–0.1)
BASOPHILS NFR BLD AUTO: 1 %
EOSINOPHIL # BLD AUTO: 0.59 X10*3/UL (ref 0–0.4)
EOSINOPHIL NFR BLD AUTO: 7.6 %
ERYTHROCYTE [DISTWIDTH] IN BLOOD BY AUTOMATED COUNT: 13.7 % (ref 11.5–14.5)
HCT VFR BLD AUTO: 34.7 % (ref 36–46)
HGB BLD-MCNC: 10.9 G/DL (ref 12–16)
IMM GRANULOCYTES # BLD AUTO: 0.04 X10*3/UL (ref 0–0.5)
IMM GRANULOCYTES NFR BLD AUTO: 0.5 % (ref 0–0.9)
LYMPHOCYTES # BLD AUTO: 1.67 X10*3/UL (ref 0.8–3)
LYMPHOCYTES NFR BLD AUTO: 21.5 %
MCH RBC QN AUTO: 31.1 PG (ref 26–34)
MCHC RBC AUTO-ENTMCNC: 31.4 G/DL (ref 32–36)
MCV RBC AUTO: 99 FL (ref 80–100)
MONOCYTES # BLD AUTO: 0.63 X10*3/UL (ref 0.05–0.8)
MONOCYTES NFR BLD AUTO: 8.1 %
NEUTROPHILS # BLD AUTO: 4.77 X10*3/UL (ref 1.6–5.5)
NEUTROPHILS NFR BLD AUTO: 61.3 %
NRBC BLD-RTO: 0 /100 WBCS (ref 0–0)
PLATELET # BLD AUTO: 258 X10*3/UL (ref 150–450)
RBC # BLD AUTO: 3.51 X10*6/UL (ref 4–5.2)
WBC # BLD AUTO: 7.8 X10*3/UL (ref 4.4–11.3)

## 2025-07-14 PROCEDURE — 85025 COMPLETE CBC W/AUTO DIFF WBC: CPT

## 2025-07-15 RX ORDER — FOLIC ACID 1 MG/1
TABLET ORAL
Qty: 90 TABLET | Refills: 0 | Status: SHIPPED | OUTPATIENT
Start: 2025-07-15

## 2025-07-16 ENCOUNTER — OFFICE VISIT (OUTPATIENT)
Dept: HEMATOLOGY/ONCOLOGY | Facility: HOSPITAL | Age: 85
End: 2025-07-16
Payer: MEDICARE

## 2025-07-16 ENCOUNTER — LAB (OUTPATIENT)
Dept: LAB | Facility: HOSPITAL | Age: 85
End: 2025-07-16
Payer: MEDICARE

## 2025-07-16 VITALS
TEMPERATURE: 97.7 F | OXYGEN SATURATION: 97 % | RESPIRATION RATE: 16 BRPM | DIASTOLIC BLOOD PRESSURE: 94 MMHG | WEIGHT: 165.12 LBS | SYSTOLIC BLOOD PRESSURE: 164 MMHG | HEART RATE: 74 BPM | HEIGHT: 64 IN | BODY MASS INDEX: 28.19 KG/M2

## 2025-07-16 DIAGNOSIS — C50.911 MALIGNANT NEOPLASM OF RIGHT FEMALE BREAST, UNSPECIFIED ESTROGEN RECEPTOR STATUS, UNSPECIFIED SITE OF BREAST: ICD-10-CM

## 2025-07-16 DIAGNOSIS — C50.911 MALIGNANT NEOPLASM OF UNSPECIFIED SITE OF RIGHT FEMALE BREAST: ICD-10-CM

## 2025-07-16 DIAGNOSIS — D64.9 ANEMIA, UNSPECIFIED TYPE: Primary | ICD-10-CM

## 2025-07-16 DIAGNOSIS — D64.9 ANEMIA, UNSPECIFIED: Primary | ICD-10-CM

## 2025-07-16 DIAGNOSIS — I10 BENIGN ESSENTIAL HYPERTENSION: ICD-10-CM

## 2025-07-16 LAB
FERRITIN SERPL-MCNC: 975 NG/ML (ref 8–150)
FOLATE SERPL-MCNC: >24 NG/ML
IRON SATN MFR SERPL: 29 % (ref 25–45)
IRON SERPL-MCNC: 75 UG/DL (ref 35–150)
TIBC SERPL-MCNC: 255 UG/DL (ref 240–445)
UIBC SERPL-MCNC: 180 UG/DL (ref 110–370)
VIT B12 SERPL-MCNC: 317 PG/ML (ref 211–911)

## 2025-07-16 PROCEDURE — 83550 IRON BINDING TEST: CPT

## 2025-07-16 PROCEDURE — 3077F SYST BP >= 140 MM HG: CPT | Performed by: INTERNAL MEDICINE

## 2025-07-16 PROCEDURE — 1159F MED LIST DOCD IN RCRD: CPT | Performed by: INTERNAL MEDICINE

## 2025-07-16 PROCEDURE — 99214 OFFICE O/P EST MOD 30 MIN: CPT | Performed by: INTERNAL MEDICINE

## 2025-07-16 PROCEDURE — G2211 COMPLEX E/M VISIT ADD ON: HCPCS | Performed by: INTERNAL MEDICINE

## 2025-07-16 PROCEDURE — 82728 ASSAY OF FERRITIN: CPT

## 2025-07-16 PROCEDURE — 3080F DIAST BP >= 90 MM HG: CPT | Performed by: INTERNAL MEDICINE

## 2025-07-16 PROCEDURE — 82746 ASSAY OF FOLIC ACID SERUM: CPT

## 2025-07-16 PROCEDURE — 82607 VITAMIN B-12: CPT

## 2025-07-16 PROCEDURE — 83540 ASSAY OF IRON: CPT

## 2025-07-16 PROCEDURE — 1126F AMNT PAIN NOTED NONE PRSNT: CPT | Performed by: INTERNAL MEDICINE

## 2025-07-16 RX ORDER — LOSARTAN POTASSIUM 50 MG/1
50 TABLET ORAL DAILY
Qty: 90 TABLET | Refills: 0 | Status: SHIPPED | OUTPATIENT
Start: 2025-07-16

## 2025-07-16 ASSESSMENT — PAIN SCALES - GENERAL: PAINLEVEL_OUTOF10: 0-NO PAIN

## 2025-07-16 ASSESSMENT — PATIENT HEALTH QUESTIONNAIRE - PHQ9
1. LITTLE INTEREST OR PLEASURE IN DOING THINGS: NOT AT ALL
SUM OF ALL RESPONSES TO PHQ9 QUESTIONS 1 AND 2: 0
2. FEELING DOWN, DEPRESSED OR HOPELESS: NOT AT ALL

## 2025-07-16 ASSESSMENT — ENCOUNTER SYMPTOMS
LOSS OF SENSATION IN FEET: 0
GASTROINTESTINAL NEGATIVE: 1
DEPRESSION: 0
OCCASIONAL FEELINGS OF UNSTEADINESS: 0
CONSTITUTIONAL NEGATIVE: 1
RESPIRATORY NEGATIVE: 1

## 2025-07-16 ASSESSMENT — COLUMBIA-SUICIDE SEVERITY RATING SCALE - C-SSRS
6. HAVE YOU EVER DONE ANYTHING, STARTED TO DO ANYTHING, OR PREPARED TO DO ANYTHING TO END YOUR LIFE?: NO
1. IN THE PAST MONTH, HAVE YOU WISHED YOU WERE DEAD OR WISHED YOU COULD GO TO SLEEP AND NOT WAKE UP?: NO
2. HAVE YOU ACTUALLY HAD ANY THOUGHTS OF KILLING YOURSELF?: NO

## 2025-07-16 NOTE — PROGRESS NOTES
"Patient ID: Lilly Parr is a 85 y.o. female.    Subjective:  Returns for follow up for breast cancer and anemia. Denies having new complaints. No chest /abdominal pain. No fever or infections.     Heme/Onc History:  Stage/Status:  - Stage I ER +, her2 neg R breast ca, s/p lumpectomy in May 2016 => adjuvant Arimidex until Sep 2021  - Anemia. Required IV iron. On PO B12. We stopped po iron in 2024    Assessment/Plan:  ? Breast cancer: Has been off Arimidex for 3 years. No sign of recurrence. Last MMG was neg. Does not want to have them anymore. Ecxam is OK today    ? Anemia: Today Hb is 10.9. Normal for her age. We had stopped po iron in 2024. Will repeaet indices today.    Review Of Systems:  Review of Systems   Constitutional: Negative.    Respiratory: Negative.     Gastrointestinal: Negative.        Physical Exam:  BP (!) 164/94 (BP Location: Right arm, Patient Position: Sitting, BP Cuff Size: Adult) Comment: monitors at home, will follow up with PCP  Pulse 74   Temp 36.5 °C (97.7 °F) (Temporal)   Resp 16   Ht 1.626 m (5' 4\")   Wt 74.9 kg (165 lb 2 oz)   SpO2 97%   BMI 28.34 kg/m²   BSA: 1.84 meters squared  Performance Status: Symptomatic; fully ambulatory  Physical Exam  Constitutional:       Appearance: Normal appearance.     Cardiovascular:      Rate and Rhythm: Normal rate.   Pulmonary:      Effort: Pulmonary effort is normal.   Abdominal:      General: Abdomen is flat.     Neurological:      Mental Status: She is alert.         Results:  Diagnostic Results   Lab Results   Component Value Date    WBC 7.8 07/14/2025    HGB 10.9 (L) 07/14/2025    HCT 34.7 (L) 07/14/2025    MCV 99 07/14/2025     07/14/2025     Lab Results   Component Value Date    CALCIUM 9.8 10/04/2024     10/04/2024    K 4.1 10/04/2024    CO2 25 10/04/2024     10/04/2024    BUN 20 10/04/2024    CREATININE 0.90 10/04/2024    ALT 11 10/04/2024    AST 24 10/04/2024       Current Outpatient Medications:     " allopurinol (Zyloprim) 100 mg tablet, TAKE 2 TABLETS BY MOUTH ONCE  DAILY NO FURTHER REFILLS UNTIL  SEEN IN OFFICE, Disp: 180 tablet, Rfl: 0    amLODIPine (Norvasc) 2.5 mg tablet, Take 1 tablet (2.5 mg) by mouth once daily in the morning. Take before meals. NO FURTHER REFILLS UNTIL SEEN IN OFFICE, Disp: 90 tablet, Rfl: 0    aspirin 81 mg EC tablet, Take 1 tablet (81 mg) by mouth once daily in the evening., Disp: , Rfl:     atorvastatin (Lipitor) 20 mg tablet, TAKE 1 TABLET BY MOUTH ONCE  DAILY AT BEDTIME, Disp: 90 tablet, Rfl: 1    carvedilol (Coreg) 6.25 mg tablet, Take 1 tablet (6.25 mg) by mouth 2 times a day with meals., Disp: 60 tablet, Rfl: 0    cyanocobalamin, vitamin B-12, 5,000 mcg tablet, sublingual, Place 1 tablet under the tongue once daily at noon. Take with meals., Disp: , Rfl:     ferrous sulfate, 325 mg ferrous sulfate, (FeroSuL) tablet, Take 1 tablet by mouth 2 times a day., Disp: 180 tablet, Rfl: 1    folic acid (Folvite) 1 mg tablet, TAKE 1 TABLET BY MOUTH ONCE  DAILY AT NOON WITH A MEAL, Disp: 90 tablet, Rfl: 0    levothyroxine (Synthroid, Levoxyl) 50 mcg tablet, TAKE 1 TABLET BY MOUTH ONCE  DAILY IN THE MORNING TAKE BEFORE MEALS, Disp: 90 tablet, Rfl: 0    losartan (Cozaar) 50 mg tablet, Take 1 tablet (50 mg) by mouth once daily., Disp: 90 tablet, Rfl: 0    omeprazole (PriLOSEC) 20 mg DR capsule, TAKE 1 CAPSULE BY MOUTH ONCE  DAILY AT BEDTIME BEFORE EATING, Disp: 90 capsule, Rfl: 0    Vitamin B-6 25 mg tablet, Take 1 tablet (25 mg) by mouth once daily at noon. Take with meals., Disp: , Rfl:     cholecalciferol (Vitamin D-3) 125 MCG (5000 UT) capsule, Take 1 capsule (125 mcg) by mouth once daily in the evening. (Patient not taking: Reported on 7/16/2025), Disp: , Rfl:      Past Surgical History:   Procedure Laterality Date    BREAST LUMPECTOMY      CARDIAC ELECTROPHYSIOLOGY PROCEDURE N/A 3/13/2024    Procedure: PPM IMPLANT DUAL;  Surgeon: Michael Gomez MD;  Location: [facility] Cardiac Cath  Lab;  Service: Electrophysiology;  Laterality: N/A;    ESOPHAGOGASTRODUODENOSCOPY  04/03/2013    Diagnostic Esophagogastroduodenoscopy    TOTAL HIP ARTHROPLASTY  08/24/2017    Total Hip Replacement     Family History   Problem Relation Name Age of Onset    Other (Other) Mother          Cardiac Faliure    Asthma Father      Breast cancer Sister        reports that she has never smoked. She has never used smokeless tobacco.    Diagnoses and all orders for this visit:  Anemia, unspecified type  -     Clinic Appointment Request  -     Ferritin; Future  -     Iron and TIBC; Future  -     Folate; Future  -     Vitamin B12; Future  -     Clinic Appointment Request; Future  -     CBC and Auto Differential; Future  -     Comprehensive Metabolic Panel; Future  -     Iron and TIBC; Future  -     Ferritin; Future  Malignant neoplasm of right female breast, unspecified estrogen receptor status, unspecified site of breast  -     Clinic Appointment Request  -     Ferritin; Future  -     Iron and TIBC; Future  -     Folate; Future  -     Vitamin B12; Future  -     Clinic Appointment Request; Future  -     CBC and Auto Differential; Future  -     Comprehensive Metabolic Panel; Future  -     Iron and TIBC; Future  -     Ferritin; Future       Morales Her MD

## 2025-07-17 ENCOUNTER — APPOINTMENT (OUTPATIENT)
Dept: HEMATOLOGY/ONCOLOGY | Facility: HOSPITAL | Age: 85
End: 2025-07-17
Payer: MEDICARE

## 2025-07-17 ENCOUNTER — TELEPHONE (OUTPATIENT)
Dept: HEMATOLOGY/ONCOLOGY | Facility: HOSPITAL | Age: 85
End: 2025-07-17
Payer: MEDICARE

## 2025-07-17 NOTE — TELEPHONE ENCOUNTER
Please let her know her iron levels are OK  Per Dr. Calvert staff message.      Contacted patient, notified her iron levels are OK. Verbalized understanding.

## 2025-07-24 ENCOUNTER — OFFICE VISIT (OUTPATIENT)
Dept: PRIMARY CARE | Facility: CLINIC | Age: 85
End: 2025-07-24
Payer: MEDICARE

## 2025-07-24 VITALS
BODY MASS INDEX: 32.12 KG/M2 | HEART RATE: 87 BPM | DIASTOLIC BLOOD PRESSURE: 100 MMHG | TEMPERATURE: 97.2 F | OXYGEN SATURATION: 97 % | SYSTOLIC BLOOD PRESSURE: 160 MMHG | WEIGHT: 163.6 LBS | HEIGHT: 60 IN

## 2025-07-24 DIAGNOSIS — E03.9 HYPOTHYROIDISM, UNSPECIFIED TYPE: ICD-10-CM

## 2025-07-24 DIAGNOSIS — I10 BENIGN ESSENTIAL HYPERTENSION: ICD-10-CM

## 2025-07-24 DIAGNOSIS — Z00.00 ROUTINE GENERAL MEDICAL EXAMINATION AT HEALTH CARE FACILITY: Primary | ICD-10-CM

## 2025-07-24 DIAGNOSIS — I44.2 COMPLETE HEART BLOCK: ICD-10-CM

## 2025-07-24 DIAGNOSIS — E78.5 DYSLIPIDEMIA: ICD-10-CM

## 2025-07-24 DIAGNOSIS — C50.911 MALIGNANT NEOPLASM OF RIGHT FEMALE BREAST, UNSPECIFIED ESTROGEN RECEPTOR STATUS, UNSPECIFIED SITE OF BREAST: ICD-10-CM

## 2025-07-24 DIAGNOSIS — M10.9 GOUT, UNSPECIFIED CAUSE, UNSPECIFIED CHRONICITY, UNSPECIFIED SITE: ICD-10-CM

## 2025-07-24 PROCEDURE — 1124F ACP DISCUSS-NO DSCNMKR DOCD: CPT | Performed by: INTERNAL MEDICINE

## 2025-07-24 PROCEDURE — 1160F RVW MEDS BY RX/DR IN RCRD: CPT | Performed by: INTERNAL MEDICINE

## 2025-07-24 PROCEDURE — 3080F DIAST BP >= 90 MM HG: CPT | Performed by: INTERNAL MEDICINE

## 2025-07-24 PROCEDURE — 1170F FXNL STATUS ASSESSED: CPT | Performed by: INTERNAL MEDICINE

## 2025-07-24 PROCEDURE — 99214 OFFICE O/P EST MOD 30 MIN: CPT | Performed by: INTERNAL MEDICINE

## 2025-07-24 PROCEDURE — 1036F TOBACCO NON-USER: CPT | Performed by: INTERNAL MEDICINE

## 2025-07-24 PROCEDURE — 3077F SYST BP >= 140 MM HG: CPT | Performed by: INTERNAL MEDICINE

## 2025-07-24 PROCEDURE — 1159F MED LIST DOCD IN RCRD: CPT | Performed by: INTERNAL MEDICINE

## 2025-07-24 PROCEDURE — G0439 PPPS, SUBSEQ VISIT: HCPCS | Performed by: INTERNAL MEDICINE

## 2025-07-24 RX ORDER — AMLODIPINE BESYLATE 2.5 MG/1
2.5 TABLET ORAL
Qty: 90 TABLET | Refills: 1 | Status: SHIPPED | OUTPATIENT
Start: 2025-07-24

## 2025-07-24 RX ORDER — LOSARTAN POTASSIUM 50 MG/1
100 TABLET ORAL DAILY
COMMUNITY
Start: 2025-07-24

## 2025-07-24 RX ORDER — ATORVASTATIN CALCIUM 20 MG/1
20 TABLET, FILM COATED ORAL NIGHTLY
Qty: 90 TABLET | Refills: 1 | Status: SHIPPED | OUTPATIENT
Start: 2025-07-24

## 2025-07-24 RX ORDER — LEVOTHYROXINE SODIUM 50 UG/1
50 TABLET ORAL
Qty: 90 TABLET | Refills: 1 | Status: SHIPPED | OUTPATIENT
Start: 2025-07-24

## 2025-07-24 RX ORDER — CARVEDILOL 6.25 MG/1
12.5 TABLET ORAL
COMMUNITY
Start: 2025-07-24

## 2025-07-24 ASSESSMENT — ENCOUNTER SYMPTOMS
BLOOD IN STOOL: 0
DIFFICULTY URINATING: 0
DIARRHEA: 0
COUGH: 0
SORE THROAT: 0
HEADACHES: 0
UNEXPECTED WEIGHT CHANGE: 0
ABDOMINAL PAIN: 0
FEVER: 0
FATIGUE: 0
ARTHRALGIAS: 0
WHEEZING: 0
BRUISES/BLEEDS EASILY: 0
DIZZINESS: 0
PALPITATIONS: 0
SINUS PAIN: 0

## 2025-07-24 ASSESSMENT — ACTIVITIES OF DAILY LIVING (ADL)
MANAGING_FINANCES: INDEPENDENT
BATHING: INDEPENDENT
DOING_HOUSEWORK: INDEPENDENT
TAKING_MEDICATION: INDEPENDENT
GROCERY_SHOPPING: INDEPENDENT
DRESSING: INDEPENDENT

## 2025-07-24 NOTE — ASSESSMENT & PLAN NOTE
Orders:    amLODIPine (Norvasc) 2.5 mg tablet; Take 1 tablet (2.5 mg) by mouth once daily in the morning. Take before meals.

## 2025-07-24 NOTE — PROGRESS NOTES
"Subjective   Patient ID: Lilly Parr is a 85 y.o. female who presents for Medicare Annual Wellness Visit Subsequent.    HPI       Review of Systems    Objective   No results found for: \"HGBA1C\"   BP (!) 160/100   Pulse 87   Temp 36.2 °C (97.2 °F)   Ht (!) 1.524 m (5')   Wt 74.2 kg (163 lb 9.6 oz)   SpO2 97%   BMI 31.95 kg/m²     Physical Exam    Assessment/Plan   Lilly was seen today for medicare annual wellness visit subsequent.  Diagnoses and all orders for this visit:  Benign essential hypertension  -     amLODIPine (Norvasc) 2.5 mg tablet; Take 1 tablet (2.5 mg) by mouth once daily in the morning. Take before meals.  Dyslipidemia  -     atorvastatin (Lipitor) 20 mg tablet; Take 1 tablet (20 mg) by mouth once daily at bedtime.  Hypothyroidism, unspecified type  -     levothyroxine (Synthroid, Levoxyl) 50 mcg tablet; Take 1 tablet (50 mcg) by mouth once daily in the morning. Take before meals.     "

## 2025-07-24 NOTE — PROGRESS NOTES
Subjective   Reason for Visit: Lilly Parr is an 85 y.o. female here for a Medicare Wellness visit.     Past Medical, Surgical, and Family History reviewed and updated in chart.    Reviewed all medications by prescribing practitioner or clinical pharmacist (such as prescriptions, OTCs, herbal therapies and supplements) and documented in the medical record.    - Annual preventive visit  - Needs to follow-up screening mammogram is recommended  - Vaccinations reviewed and up-to-date  - Screening for colon cancer up-to-date no need to repeat  - Screen for depression negative  - Advanced directive reviewed  - Medical screening reviewed    Follow-up  -Chronic anemia no change follow-up hematology oncology as a scheduled continue with conservative measures  - Uncontrolled hypertension patient need to increase carvedilol 6.25 mg to take 2 tablets twice a day  Continue with amlodipine once a day  Increase losartan 50 mg to take 2 tablets daily.  -Chronic kidney disease stable follow-up nephrology follow-up blood work as recommended avoid salt in diet avoid NSAIDs  - Hypertension improved continue on carvedilol amlodipine and losartan no medication side effect  - Status post pacemaker placement doing well no palpitations.  -Reflux disease symptoms continue with omeprazole 20 mg daily.  - Continue on aspirin vitamin D ferrous sulfate folic acid and vitamin B6.  - CT results are chronic lung nodule with benign lesion asymptomatic no need for further screening  No shortness of breath no coughing no wheezing continue monitor conservatively  -Chronic anemia continue with current vitamin B12 and vitamin B6 iron supplement anemia of chronic disease  - Patient declined bone marrow biopsy aware of risk and benefit continue monitor conservatively  --Chronic kidney disease, controlled continue with low-salt diet increase fluid intake no change continue monitoring conservatively patient scheduled to see nephrology again in  September 2024  -Breast cancer in remission continue   -Hypothyroid continue the levothyroxine , compensated.  Follow-up 4 weeks             Patient Care Team:  Jarvis Fuentes MD as PCP - General  Jarvis Fuentes MD as PCP - [facility]  Flor Barton MD as Consulting Physician (Hematology and Oncology)  Morales Her MD as Medical Oncologist (Hematology and Oncology)     Review of Systems   Constitutional:  Negative for fatigue, fever and unexpected weight change.   HENT:  Negative for congestion, ear discharge, ear pain, mouth sores, sinus pain and sore throat.    Eyes:  Negative for visual disturbance.   Respiratory:  Negative for cough and wheezing.    Cardiovascular:  Negative for chest pain, palpitations and leg swelling.   Gastrointestinal:  Negative for abdominal pain, blood in stool and diarrhea.   Genitourinary:  Negative for difficulty urinating.   Musculoskeletal:  Negative for arthralgias.   Skin:  Negative for rash.   Neurological:  Negative for dizziness and headaches.   Hematological:  Does not bruise/bleed easily.   Psychiatric/Behavioral:  Negative for behavioral problems.    All other systems reviewed and are negative.      Objective   Vitals:  BP (!) 160/100   Pulse 87   Temp 36.2 °C (97.2 °F)   Ht (!) 1.524 m (5')   Wt 74.2 kg (163 lb 9.6 oz)   SpO2 97%   BMI 31.95 kg/m²     Lab Results   Component Value Date    WBC 7.8 07/14/2025    HGB 10.9 (L) 07/14/2025    HCT 34.7 (L) 07/14/2025     07/14/2025    CHOL 130 10/04/2023    TRIG 98 10/04/2023    HDL 46.4 10/04/2023    ALT 11 10/04/2024    AST 24 10/04/2024     10/04/2024    K 4.1 10/04/2024     10/04/2024    CREATININE 0.90 10/04/2024    BUN 20 10/04/2024    CO2 25 10/04/2024    TSH 1.88 03/11/2024    INR 1.0 03/11/2024     par   Physical Exam    Assessment & Plan  Benign essential hypertension    Orders:    amLODIPine (Norvasc) 2.5 mg tablet; Take 1 tablet (2.5 mg) by mouth once daily in  the morning. Take before meals.    Dyslipidemia    Orders:    atorvastatin (Lipitor) 20 mg tablet; Take 1 tablet (20 mg) by mouth once daily at bedtime.    Hypothyroidism, unspecified type    Orders:    levothyroxine (Synthroid, Levoxyl) 50 mcg tablet; Take 1 tablet (50 mcg) by mouth once daily in the morning. Take before meals.    Routine general medical examination at health care facility    Orders:    1 Year Follow Up In Primary Care - Wellness Exam; Future    Complete heart block         Malignant neoplasm of right female breast, unspecified estrogen receptor status, unspecified site of breast         Gout, unspecified cause, unspecified chronicity, unspecified site          - Annual preventive visit  - Needs to follow-up screening mammogram is recommended  - Vaccinations reviewed and up-to-date  - Screening for colon cancer up-to-date no need to repeat  - Screen for depression negative  - Advanced directive reviewed  - Medical screening reviewed    Follow-up  -Chronic anemia no change follow-up hematology oncology as a scheduled continue with conservative measures  - Uncontrolled hypertension patient need to increase carvedilol 6.25 mg to take 2 tablets twice a day  Continue with amlodipine once a day  Increase losartan 50 mg to take 2 tablets daily.  -Chronic kidney disease stable follow-up nephrology follow-up blood work as recommended avoid salt in diet avoid NSAIDs  - Hypertension improved continue on carvedilol amlodipine and losartan no medication side effect  - Status post pacemaker placement doing well no palpitations.  -Reflux disease symptoms continue with omeprazole 20 mg daily.  - Continue on aspirin vitamin D ferrous sulfate folic acid and vitamin B6.  - CT results are chronic lung nodule with benign lesion asymptomatic no need for further screening  No shortness of breath no coughing no wheezing continue monitor conservatively  -Chronic anemia continue with current vitamin B12 and vitamin B6 iron  supplement anemia of chronic disease  - Patient declined bone marrow biopsy aware of risk and benefit continue monitor conservatively  --Chronic kidney disease, controlled continue with low-salt diet increase fluid intake no change continue monitoring conservatively patient scheduled to see nephrology again in September 2024  -Breast cancer in remission continue   -Hypothyroid continue the levothyroxine , compensated.  Follow-up 4 weeks

## 2025-07-24 NOTE — ASSESSMENT & PLAN NOTE
Orders:    levothyroxine (Synthroid, Levoxyl) 50 mcg tablet; Take 1 tablet (50 mcg) by mouth once daily in the morning. Take before meals.

## 2025-08-13 DIAGNOSIS — M10.9 GOUT, UNSPECIFIED CAUSE, UNSPECIFIED CHRONICITY, UNSPECIFIED SITE: ICD-10-CM

## 2025-08-13 DIAGNOSIS — I10 BENIGN ESSENTIAL HYPERTENSION: ICD-10-CM

## 2025-08-13 DIAGNOSIS — K21.9 GERD WITHOUT ESOPHAGITIS: ICD-10-CM

## 2025-08-13 RX ORDER — LOSARTAN POTASSIUM 50 MG/1
100 TABLET ORAL DAILY
Qty: 180 TABLET | Refills: 0 | Status: SHIPPED | OUTPATIENT
Start: 2025-08-13

## 2025-08-14 RX ORDER — OMEPRAZOLE 20 MG/1
CAPSULE, DELAYED RELEASE ORAL
Qty: 90 CAPSULE | Refills: 0 | Status: SHIPPED | OUTPATIENT
Start: 2025-08-14

## 2025-08-14 RX ORDER — ALLOPURINOL 100 MG/1
TABLET ORAL
Qty: 180 TABLET | Refills: 0 | Status: SHIPPED | OUTPATIENT
Start: 2025-08-14

## 2025-08-19 ENCOUNTER — APPOINTMENT (OUTPATIENT)
Dept: PRIMARY CARE | Facility: CLINIC | Age: 85
End: 2025-08-19
Payer: MEDICARE

## 2025-08-19 VITALS
HEART RATE: 75 BPM | WEIGHT: 164.2 LBS | BODY MASS INDEX: 32.07 KG/M2 | SYSTOLIC BLOOD PRESSURE: 130 MMHG | DIASTOLIC BLOOD PRESSURE: 80 MMHG | OXYGEN SATURATION: 99 % | TEMPERATURE: 98 F

## 2025-08-19 DIAGNOSIS — I12.9 STAGE 3 CHRONIC KIDNEY DISEASE DUE TO BENIGN HYPERTENSION (MULTI): ICD-10-CM

## 2025-08-19 DIAGNOSIS — N18.30 STAGE 3 CHRONIC KIDNEY DISEASE DUE TO BENIGN HYPERTENSION (MULTI): ICD-10-CM

## 2025-08-19 DIAGNOSIS — E78.5 DYSLIPIDEMIA: ICD-10-CM

## 2025-08-19 DIAGNOSIS — R53.83 OTHER FATIGUE: ICD-10-CM

## 2025-08-19 DIAGNOSIS — E03.9 HYPOTHYROIDISM, UNSPECIFIED TYPE: ICD-10-CM

## 2025-08-19 DIAGNOSIS — D64.9 ANEMIA, UNSPECIFIED TYPE: ICD-10-CM

## 2025-08-19 DIAGNOSIS — I10 BENIGN ESSENTIAL HYPERTENSION: ICD-10-CM

## 2025-08-19 DIAGNOSIS — Z79.899 HIGH RISK MEDICATION USE: Primary | ICD-10-CM

## 2025-08-19 DIAGNOSIS — I10 HYPERTENSION, UNSPECIFIED TYPE: ICD-10-CM

## 2025-08-19 DIAGNOSIS — I44.2 COMPLETE HEART BLOCK: ICD-10-CM

## 2025-08-19 PROCEDURE — 3079F DIAST BP 80-89 MM HG: CPT | Performed by: INTERNAL MEDICINE

## 2025-08-19 PROCEDURE — 1159F MED LIST DOCD IN RCRD: CPT | Performed by: INTERNAL MEDICINE

## 2025-08-19 PROCEDURE — 99214 OFFICE O/P EST MOD 30 MIN: CPT | Performed by: INTERNAL MEDICINE

## 2025-08-19 PROCEDURE — G2211 COMPLEX E/M VISIT ADD ON: HCPCS | Performed by: INTERNAL MEDICINE

## 2025-08-19 PROCEDURE — 1160F RVW MEDS BY RX/DR IN RCRD: CPT | Performed by: INTERNAL MEDICINE

## 2025-08-19 PROCEDURE — 1036F TOBACCO NON-USER: CPT | Performed by: INTERNAL MEDICINE

## 2025-08-19 PROCEDURE — 3075F SYST BP GE 130 - 139MM HG: CPT | Performed by: INTERNAL MEDICINE

## 2025-08-19 RX ORDER — LOSARTAN POTASSIUM 100 MG/1
100 TABLET ORAL DAILY
Qty: 90 TABLET | Refills: 1 | Status: SHIPPED | OUTPATIENT
Start: 2025-08-19 | End: 2026-02-15

## 2025-08-19 RX ORDER — CARVEDILOL 6.25 MG/1
12.5 TABLET ORAL 2 TIMES DAILY
Qty: 360 TABLET | Refills: 1 | Status: SHIPPED | OUTPATIENT
Start: 2025-08-19

## 2025-08-19 RX ORDER — AMLODIPINE BESYLATE 2.5 MG/1
2.5 TABLET ORAL
Qty: 90 TABLET | Refills: 1 | Status: SHIPPED | OUTPATIENT
Start: 2025-08-19

## 2025-08-19 ASSESSMENT — ENCOUNTER SYMPTOMS
FATIGUE: 0
SINUS PAIN: 0
WHEEZING: 0
COUGH: 0
PALPITATIONS: 0
DIARRHEA: 0
SORE THROAT: 0
DIZZINESS: 0
BRUISES/BLEEDS EASILY: 0
DIFFICULTY URINATING: 0
BLOOD IN STOOL: 0
FEVER: 0
HEADACHES: 0
ARTHRALGIAS: 0
ABDOMINAL PAIN: 0
UNEXPECTED WEIGHT CHANGE: 0

## 2025-08-19 ASSESSMENT — PATIENT HEALTH QUESTIONNAIRE - PHQ9
SUM OF ALL RESPONSES TO PHQ9 QUESTIONS 1 AND 2: 0
2. FEELING DOWN, DEPRESSED OR HOPELESS: NOT AT ALL
1. LITTLE INTEREST OR PLEASURE IN DOING THINGS: NOT AT ALL

## 2025-10-06 ENCOUNTER — APPOINTMENT (OUTPATIENT)
Dept: NEPHROLOGY | Facility: CLINIC | Age: 85
End: 2025-10-06
Payer: MEDICARE

## 2025-11-25 ENCOUNTER — APPOINTMENT (OUTPATIENT)
Dept: PRIMARY CARE | Facility: CLINIC | Age: 85
End: 2025-11-25
Payer: MEDICARE

## (undated) DEVICE — GLIDEWIRE, ANGLE, STANDARD, .035 X 80CM

## (undated) DEVICE — INTRODUCER SYSTEM, PRELUDE SNAP, SPLITTABLE, HEMOSTATIC, 7FR

## (undated) DEVICE — INTRODUCER SYSTEM, PRELUDE SNAP, SPLITTABLE, 9 FR X 13 CM